# Patient Record
Sex: FEMALE | Race: WHITE | NOT HISPANIC OR LATINO | Employment: OTHER | URBAN - NONMETROPOLITAN AREA
[De-identification: names, ages, dates, MRNs, and addresses within clinical notes are randomized per-mention and may not be internally consistent; named-entity substitution may affect disease eponyms.]

---

## 2017-07-07 ENCOUNTER — TRANSCRIBE ORDERS (OUTPATIENT)
Dept: CARDIAC SURGERY | Facility: CLINIC | Age: 47
End: 2017-07-07

## 2017-07-07 DIAGNOSIS — N18.30 CHRONIC KIDNEY DISEASE, STAGE III (MODERATE) (HCC): Primary | ICD-10-CM

## 2017-08-01 RX ORDER — CITALOPRAM 40 MG/1
40 TABLET ORAL DAILY
COMMUNITY
Start: 2016-09-30 | End: 2017-08-10 | Stop reason: SDUPTHER

## 2017-08-01 RX ORDER — DIAZEPAM 5 MG/1
5 TABLET ORAL NIGHTLY PRN
COMMUNITY
End: 2017-08-10 | Stop reason: SDUPTHER

## 2017-08-01 RX ORDER — FAMOTIDINE 10 MG
20 TABLET ORAL DAILY
COMMUNITY
End: 2017-08-10 | Stop reason: SDUPTHER

## 2017-08-01 RX ORDER — MEGESTROL ACETATE 40 MG/ML
200 SUSPENSION ORAL DAILY
COMMUNITY
End: 2017-08-10 | Stop reason: SDUPTHER

## 2017-08-01 RX ORDER — LAMOTRIGINE 150 MG/1
150 TABLET ORAL 2 TIMES DAILY
COMMUNITY
Start: 2017-03-07 | End: 2017-08-10 | Stop reason: SDUPTHER

## 2017-08-01 RX ORDER — HYDROCHLOROTHIAZIDE 12.5 MG/1
12.5 CAPSULE, GELATIN COATED ORAL DAILY
COMMUNITY
End: 2017-08-10 | Stop reason: SDUPTHER

## 2017-08-08 ENCOUNTER — ANESTHESIA (OUTPATIENT)
Dept: GASTROENTEROLOGY | Facility: HOSPITAL | Age: 47
End: 2017-08-08

## 2017-08-08 ENCOUNTER — HOSPITAL ENCOUNTER (OUTPATIENT)
Facility: HOSPITAL | Age: 47
Setting detail: HOSPITAL OUTPATIENT SURGERY
Discharge: HOME | End: 2017-08-08
Attending: INTERNAL MEDICINE | Admitting: INTERNAL MEDICINE

## 2017-08-08 ENCOUNTER — ANESTHESIA EVENT (OUTPATIENT)
Dept: GASTROENTEROLOGY | Facility: HOSPITAL | Age: 47
End: 2017-08-08

## 2017-08-08 VITALS
OXYGEN SATURATION: 97 % | BODY MASS INDEX: 28.34 KG/M2 | RESPIRATION RATE: 18 BRPM | HEIGHT: 67 IN | SYSTOLIC BLOOD PRESSURE: 147 MMHG | HEART RATE: 106 BPM | DIASTOLIC BLOOD PRESSURE: 81 MMHG | WEIGHT: 180.56 LBS | TEMPERATURE: 97.4 F

## 2017-08-08 DIAGNOSIS — K22.70 BARRETT'S ESOPHAGUS DETERMINED BY BIOPSY: ICD-10-CM

## 2017-08-08 PROCEDURE — 25010000002 PROPOFOL 10 MG/ML EMULSION: Performed by: NURSE ANESTHETIST, CERTIFIED REGISTERED

## 2017-08-08 PROCEDURE — 88305 TISSUE EXAM BY PATHOLOGIST: CPT | Performed by: PATHOLOGY

## 2017-08-08 PROCEDURE — 88305 TISSUE EXAM BY PATHOLOGIST: CPT | Performed by: INTERNAL MEDICINE

## 2017-08-08 RX ORDER — DEXTROSE AND SODIUM CHLORIDE 5; .45 G/100ML; G/100ML
20 INJECTION, SOLUTION INTRAVENOUS CONTINUOUS
Status: DISCONTINUED | OUTPATIENT
Start: 2017-08-08 | End: 2017-08-08 | Stop reason: HOSPADM

## 2017-08-08 RX ORDER — LIDOCAINE HYDROCHLORIDE 10 MG/ML
INJECTION, SOLUTION INFILTRATION; PERINEURAL AS NEEDED
Status: DISCONTINUED | OUTPATIENT
Start: 2017-08-08 | End: 2017-08-08 | Stop reason: SURG

## 2017-08-08 RX ORDER — PROPOFOL 10 MG/ML
VIAL (ML) INTRAVENOUS AS NEEDED
Status: DISCONTINUED | OUTPATIENT
Start: 2017-08-08 | End: 2017-08-08 | Stop reason: SURG

## 2017-08-08 RX ADMIN — PROPOFOL 40 MG: 10 INJECTION, EMULSION INTRAVENOUS at 08:45

## 2017-08-08 RX ADMIN — LIDOCAINE HYDROCHLORIDE 100 MG: 10 INJECTION, SOLUTION INFILTRATION; PERINEURAL at 08:37

## 2017-08-08 RX ADMIN — PROPOFOL 40 MG: 10 INJECTION, EMULSION INTRAVENOUS at 08:39

## 2017-08-08 RX ADMIN — PROPOFOL 40 MG: 10 INJECTION, EMULSION INTRAVENOUS at 08:51

## 2017-08-08 RX ADMIN — PROPOFOL 40 MG: 10 INJECTION, EMULSION INTRAVENOUS at 08:41

## 2017-08-08 RX ADMIN — PROPOFOL 120 MG: 10 INJECTION, EMULSION INTRAVENOUS at 08:37

## 2017-08-08 RX ADMIN — DEXTROSE AND SODIUM CHLORIDE 20 ML/HR: 5; 450 INJECTION, SOLUTION INTRAVENOUS at 07:40

## 2017-08-08 RX ADMIN — PROPOFOL 40 MG: 10 INJECTION, EMULSION INTRAVENOUS at 08:49

## 2017-08-08 NOTE — PLAN OF CARE
Problem: Patient Care Overview (Adult)  Goal: Plan of Care Review  Outcome: Ongoing (interventions implemented as appropriate)    08/08/17 0852   Coping/Psychosocial Response Interventions   Plan Of Care Reviewed With patient   Patient Care Overview   Progress no change   Outcome Evaluation   Outcome Summary/Follow up Plan vss         Problem: GI Endoscopy (Adult)  Goal: Signs and Symptoms of Listed Potential Problems Will be Absent or Manageable (GI Endoscopy)  Outcome: Ongoing (interventions implemented as appropriate)    08/08/17 0852   GI Endoscopy   Problems Assessed (GI Endoscopy) all   Problems Present (GI Endoscopy) none

## 2017-08-08 NOTE — PLAN OF CARE
Problem: Patient Care Overview (Adult)  Goal: Plan of Care Review  Outcome: Outcome(s) achieved Date Met:  08/08/17 08/08/17 0939   Coping/Psychosocial Response Interventions   Plan Of Care Reviewed With patient   Patient Care Overview   Progress no change   Outcome Evaluation   Outcome Summary/Follow up Plan vss         Problem: GI Endoscopy (Adult)  Goal: Signs and Symptoms of Listed Potential Problems Will be Absent or Manageable (GI Endoscopy)  Outcome: Outcome(s) achieved Date Met:  08/08/17 08/08/17 0939   GI Endoscopy   Problems Assessed (GI Endoscopy) all   Problems Present (GI Endoscopy) none

## 2017-08-08 NOTE — DISCHARGE INSTR - APPOINTMENTS
Dr. Ishmael Mosley, DO  [address], [address]  [place] [address]  [phone]    Appointment date and time:  February 9th, 2018 at 9:15 a.m.

## 2017-08-08 NOTE — ANESTHESIA PREPROCEDURE EVALUATION
Anesthesia Evaluation     NPO Solid Status: > 8 hours  NPO Liquid Status: > 8 hours     Airway   Mallampati: III  TM distance: >3 FB  Neck ROM: full  Dental - normal exam     Pulmonary - normal exam   Cardiovascular - normal exam    (+) hypertension,       Neuro/Psych  (+) seizures, psychiatric history,    GI/Hepatic/Renal/Endo    (+)  renal disease,     Musculoskeletal     Abdominal    Substance History      OB/GYN          Other      history of cancer                                    Anesthesia Plan    ASA 3     MAC     intravenous induction   Anesthetic plan and risks discussed with patient.

## 2017-08-08 NOTE — H&P
Marvin Rosen DO,[facility]  Gastroenterology  Hepatology  Endoscopy  Board Certified in Internal Medicine and gastroenterology  [address], [address]  [place]. [address]  T- [phone]   F - [phone]     GASTROENTEROLOGY HISTORY AND PHYSICAL  NOTE   MARVIN ROSEN DO.         SUBJECTIVE:   8/8/2017    Name: Teodora Whitman  DOD: 1970        Chief Complaint:     Subjective : Turpin's esophagus with indefinite for dysplasia in August 2016.  The mother has been reluctant to have a repeat EGD.  I told her that with this questionable abnormality we need to make sure that there is no evidence of any progression of any dysplasia    Patient is 47 y.o. female presents with elective EGD.      ROS/HISTORY/ CURRENT MEDICATIONS/OBJECTIVE/VS/PE:   Review of Systems:   Review of Systems    History:     Past Medical History:   Diagnosis Date   • Closed fracture of rib     Closed fracture of rib - LEFT 7TH?    • Hodgkin's disease     lung   • Hypertension    • Mental retardation    • OCD (obsessive compulsive disorder)    • Rib pain on left side    • Seizures      History reviewed. No pertinent surgical history.  History reviewed. No pertinent family history.  Social History   Substance Use Topics   • Smoking status: Never Smoker   • Smokeless tobacco: None   • Alcohol use No     Prescriptions Prior to Admission   Medication Sig Dispense Refill Last Dose   • citalopram (CeleXA) 40 MG tablet Take 40 mg by mouth Daily.   8/7/2017 at Unknown time   • diazePAM (VALIUM) 5 MG tablet Take 5 mg by mouth At Night As Needed for Anxiety (1/2 tablet).   8/7/2017 at Unknown time   • famotidine (PEPCID) 10 MG tablet Take 20 mg by mouth Daily.   8/7/2017 at Unknown time   • hydrochlorothiazide (MICROZIDE) 12.5 MG capsule Take 12.5 mg by mouth Daily.   8/7/2017 at Unknown time   • lamoTRIgine (LaMICtal) 150 MG tablet Take 150 mg by mouth Daily.   8/7/2017 at Unknown time   • megestrol (MEGACE) 40 MG/ML suspension  Take 200 mg by mouth Daily.   8/7/2017 at Unknown time     Allergies:  Sulfa antibiotics    I have reviewed the patients medical history, surgical history and family history in the available medical record system.     Current Medications:     Current Facility-Administered Medications   Medication Dose Route Frequency Provider Last Rate Last Dose   • dextrose 5 % and sodium chloride 0.45 % infusion  20 mL/hr Intravenous Continuous Ishmael Mosley, DO 20 mL/hr at 08/08/17 0740 20 mL/hr at 08/08/17 0740       Objective     Physical Exam:   Temp:  [98.6 °F (37 °C)] 98.6 °F (37 °C)  Heart Rate:  [115] 115  Resp:  [16] 16  BP: (174)/(89) 174/89    Physical Exam:  General Appearance:    Alert, cooperative, in no acute distress   Head:    Normocephalic, without obvious abnormality, atraumatic   Eyes:            Lids and lashes normal, conjunctivae and sclerae normal, no   icterus, no pallor, corneas clear, PERRLA   Ears:    Ears appear intact with no abnormalities noted   Throat:   No oral lesions, no thrush, oral mucosa moist   Neck:   No adenopathy, supple, trachea midline, no thyromegaly, no     carotid bruit, no JVD   Back:     No kyphosis present, no scoliosis present, no skin lesions,       erythema or scars, no tenderness to percussion or                   palpation,   range of motion normal   Lungs:     Clear to auscultation,respirations regular, even and                   unlabored    Heart:    Regular rhythm and normal rate, normal S1 and S2, no            murmur, no gallop, no rub, no click   Breast Exam:    Deferred   Abdomen:     Normal bowel sounds, no masses, no organomegaly, soft        non-tender, non-distended, no guarding, no rebound                 tenderness   Genitalia:    Deferred   Extremities:   Moves all extremities well, no edema, no cyanosis, no              redness   Pulses:   Pulses palpable and equal bilaterally   Skin:   No bleeding, bruising or rash   Lymph nodes:   No palpable adenopathy    Neurologic:   Cranial nerves 2 - 12 grossly intact, sensation intact, DTR        present and equal bilaterally      Results Review:     Lab Results   Component Value Date    WBC 7.3 05/11/2015    WBC 5.8 04/24/2015    WBC 5.8 03/26/2015    HGB 10.0 (L) 05/11/2015    HGB 10.3 (L) 04/24/2015    HGB 10.9 (L) 03/26/2015    HCT 32.5 (L) 05/11/2015    HCT 33.5 (L) 04/24/2015    HCT 34.5 (L) 03/26/2015    HCT 34.5 (L) 03/26/2015     05/11/2015     04/24/2015     03/26/2015             No results found for: LIPASE  No results found for: INR       Radiology Review:  Imaging Results (last 72 hours)     ** No results found for the last 72 hours. **           I reviewed the patient's new clinical results.  I reviewed the patient's new imaging results and agree with the interpretation.     ASSESSMENT/PLAN:   ASSESSMENT:   1.  Turpin's esophagus with indefinite for dysplasia    PLAN:   1.  Esophagogastroduodenoscopy with biopsies    Risk and benefits associated with the procedure are reviewed with the patient and mother.  The patient wishes to proceed along with the mother's permission      Ishmael Mosley DO  08/08/17  8:04 AM

## 2017-08-08 NOTE — ANESTHESIA POSTPROCEDURE EVALUATION
Patient: Teodora Whitman    Procedure Summary     Date Anesthesia Start Anesthesia Stop Room / Location    08/08/17 0823 0855 [facility] ENDOSCOPY 2 / [facility] ENDOSCOPY       Procedure Diagnosis Surgeon Provider    ESOPHAGOGASTRODUODENOSCOPY (N/A Esophagus) Turpin's esophagus determined by biopsy  (BARRETTSE) DO Jas Rice CRNA          Anesthesia Type: MAC  Last vitals  BP        Temp        Pulse       Resp        SpO2          Post Anesthesia Care and Evaluation    Patient location during evaluation: bedside  Patient participation: waiting for patient participation  Level of consciousness: responsive to verbal stimuli  Pain management: adequate  Airway patency: patent  Anesthetic complications: No anesthetic complications  PONV Status: none  Cardiovascular status: acceptable  Respiratory status: acceptable  Hydration status: acceptable

## 2017-08-10 ENCOUNTER — APPOINTMENT (OUTPATIENT)
Dept: LAB | Facility: HOSPITAL | Age: 47
End: 2017-08-10

## 2017-08-10 ENCOUNTER — OFFICE VISIT (OUTPATIENT)
Dept: FAMILY MEDICINE CLINIC | Facility: CLINIC | Age: 47
End: 2017-08-10

## 2017-08-10 VITALS
HEIGHT: 67 IN | DIASTOLIC BLOOD PRESSURE: 84 MMHG | WEIGHT: 181.2 LBS | BODY MASS INDEX: 28.44 KG/M2 | SYSTOLIC BLOOD PRESSURE: 140 MMHG

## 2017-08-10 DIAGNOSIS — F42.9 OCD (OBSESSIVE COMPULSIVE DISORDER): ICD-10-CM

## 2017-08-10 DIAGNOSIS — Z79.899 HIGH RISK MEDICATION USE: ICD-10-CM

## 2017-08-10 DIAGNOSIS — I10 ESSENTIAL HYPERTENSION: ICD-10-CM

## 2017-08-10 DIAGNOSIS — G40.909 SEIZURE DISORDER (HCC): Primary | ICD-10-CM

## 2017-08-10 PROBLEM — N18.9 CKD (CHRONIC KIDNEY DISEASE): Status: ACTIVE | Noted: 2017-08-10

## 2017-08-10 LAB
ALBUMIN SERPL-MCNC: 4.4 G/DL (ref 3.4–4.8)
ALBUMIN/GLOB SERPL: 1.4 G/DL (ref 1.1–1.8)
ALP SERPL-CCNC: 95 U/L (ref 38–126)
ALT SERPL W P-5'-P-CCNC: 35 U/L (ref 9–52)
ANION GAP SERPL CALCULATED.3IONS-SCNC: 12 MMOL/L (ref 5–15)
ARTICHOKE IGE QN: 143 MG/DL (ref 1–129)
AST SERPL-CCNC: 29 U/L (ref 14–36)
BILIRUB SERPL-MCNC: 0.4 MG/DL (ref 0.2–1.3)
BUN BLD-MCNC: 19 MG/DL (ref 7–21)
BUN/CREAT SERPL: 14.4 (ref 7–25)
CALCIUM SPEC-SCNC: 9.5 MG/DL (ref 8.4–10.2)
CHLORIDE SERPL-SCNC: 98 MMOL/L (ref 95–110)
CHOLEST SERPL-MCNC: 195 MG/DL (ref 0–199)
CO2 SERPL-SCNC: 29 MMOL/L (ref 22–31)
CREAT BLD-MCNC: 1.32 MG/DL (ref 0.5–1)
DEPRECATED RDW RBC AUTO: 42.4 FL (ref 36.4–46.3)
ERYTHROCYTE [DISTWIDTH] IN BLOOD BY AUTOMATED COUNT: 13.8 % (ref 11.5–14.5)
GFR SERPL CREATININE-BSD FRML MDRD: 43 ML/MIN/1.73 (ref 60–135)
GLOBULIN UR ELPH-MCNC: 3.1 GM/DL (ref 2.3–3.5)
GLUCOSE BLD-MCNC: 95 MG/DL (ref 60–100)
HCT VFR BLD AUTO: 36 % (ref 35–45)
HDLC SERPL-MCNC: 44 MG/DL (ref 60–200)
HGB BLD-MCNC: 11.2 G/DL (ref 12–15.5)
LAB AP CASE REPORT: NORMAL
LAB AP INTRADEPARTMENTAL CONSULT: NORMAL
LDLC/HDLC SERPL: 2.9 {RATIO} (ref 0–3.22)
Lab: NORMAL
MCH RBC QN AUTO: 26.2 PG (ref 26.5–34)
MCHC RBC AUTO-ENTMCNC: 31.1 G/DL (ref 31.4–36)
MCV RBC AUTO: 84.3 FL (ref 80–98)
PATH REPORT.FINAL DX SPEC: NORMAL
PATH REPORT.GROSS SPEC: NORMAL
PLATELET # BLD AUTO: 377 10*3/MM3 (ref 150–450)
PMV BLD AUTO: 8.8 FL (ref 8–12)
POTASSIUM BLD-SCNC: 3.9 MMOL/L (ref 3.5–5.1)
PROT SERPL-MCNC: 7.5 G/DL (ref 6.3–8.6)
RBC # BLD AUTO: 4.27 10*6/MM3 (ref 3.77–5.16)
SODIUM BLD-SCNC: 139 MMOL/L (ref 137–145)
TRIGL SERPL-MCNC: 118 MG/DL (ref 20–199)
WBC NRBC COR # BLD: 6.52 10*3/MM3 (ref 3.2–9.8)

## 2017-08-10 PROCEDURE — 80053 COMPREHEN METABOLIC PANEL: CPT | Performed by: FAMILY MEDICINE

## 2017-08-10 PROCEDURE — 85027 COMPLETE CBC AUTOMATED: CPT | Performed by: FAMILY MEDICINE

## 2017-08-10 PROCEDURE — 99214 OFFICE O/P EST MOD 30 MIN: CPT | Performed by: FAMILY MEDICINE

## 2017-08-10 PROCEDURE — 80307 DRUG TEST PRSMV CHEM ANLYZR: CPT | Performed by: FAMILY MEDICINE

## 2017-08-10 PROCEDURE — 36415 COLL VENOUS BLD VENIPUNCTURE: CPT | Performed by: FAMILY MEDICINE

## 2017-08-10 PROCEDURE — 80061 LIPID PANEL: CPT | Performed by: FAMILY MEDICINE

## 2017-08-10 PROCEDURE — G0481 DRUG TEST DEF 8-14 CLASSES: HCPCS | Performed by: FAMILY MEDICINE

## 2017-08-10 PROCEDURE — 80175 DRUG SCREEN QUAN LAMOTRIGINE: CPT | Performed by: FAMILY MEDICINE

## 2017-08-10 RX ORDER — CITALOPRAM 40 MG/1
40 TABLET ORAL DAILY
Qty: 90 TABLET | Refills: 2 | Status: SHIPPED | OUTPATIENT
Start: 2017-08-10 | End: 2018-08-07 | Stop reason: SDUPTHER

## 2017-08-10 RX ORDER — LAMOTRIGINE 150 MG/1
150 TABLET ORAL 2 TIMES DAILY
Qty: 180 TABLET | Refills: 2 | Status: SHIPPED | OUTPATIENT
Start: 2017-08-10 | End: 2018-03-22 | Stop reason: SDUPTHER

## 2017-08-10 RX ORDER — DIAZEPAM 5 MG/1
5 TABLET ORAL NIGHTLY PRN
Qty: 30 TABLET | Refills: 2 | Status: SHIPPED | OUTPATIENT
Start: 2017-08-10 | End: 2017-11-13 | Stop reason: SDUPTHER

## 2017-08-10 RX ORDER — MEGESTROL ACETATE 40 MG/ML
200 SUSPENSION ORAL DAILY
Qty: 150 ML | Refills: 6 | Status: SHIPPED | OUTPATIENT
Start: 2017-08-10 | End: 2017-11-13 | Stop reason: SDUPTHER

## 2017-08-10 RX ORDER — FAMOTIDINE 10 MG
20 TABLET ORAL DAILY
Qty: 180 TABLET | Refills: 2 | Status: SHIPPED | OUTPATIENT
Start: 2017-08-10 | End: 2018-04-02

## 2017-08-10 RX ORDER — HYDROCHLOROTHIAZIDE 12.5 MG/1
12.5 CAPSULE, GELATIN COATED ORAL DAILY
Qty: 90 CAPSULE | Refills: 2 | Status: SHIPPED | OUTPATIENT
Start: 2017-08-10 | End: 2018-02-07 | Stop reason: SDUPTHER

## 2017-08-10 NOTE — PROGRESS NOTES
Subjective   Teodora Whitman is a 47 y.o. female.     History of Present Illness   Ms. Whitman is a 46yo female that presents today for the first time with her mother. She has been seeing Dr. Restrepo before.  She has seizure disorder that is well controlled. She hasn't had any seizures since she was a small child, but when she tried to come off the medications and she had some seizures.    She has had issues with OCS and selective mutism. She lives with her mother and has issues with mood swings.  She has more behavioral issues when she has headaches. She gets a lot of sinus headaches.  When she takes her sinus medication starts to work then she is feeling better. She also has been doing better since she got a new puppy.  She has had issues with her periods as well. She has been taking megace and that has stopped her periods. She hasn't had as many issues with mood swings.    She has been on celexa for OCD and that has been helping. They have tried to take her off that medication and she had a lot of issues with compulsions. She has been taking valium also. That has mellowed her out a lot.    She has hypertension and she has had new diagnosis of renal disease.  She has been taking HCTZ and he stopped her lisinopril. BP has been okay since that change was made. She has a follow-up with Dr. Arce later this month.      Current Outpatient Prescriptions:   •  citalopram (CeleXA) 40 MG tablet, Take 40 mg by mouth Daily., Disp: , Rfl:   •  diazePAM (VALIUM) 5 MG tablet, Take 5 mg by mouth At Night As Needed for Anxiety (1/2 tablet)., Disp: , Rfl:   •  famotidine (PEPCID) 10 MG tablet, Take 20 mg by mouth Daily., Disp: , Rfl:   •  hydrochlorothiazide (MICROZIDE) 12.5 MG capsule, Take 12.5 mg by mouth Daily., Disp: , Rfl:   •  lamoTRIgine (LaMICtal) 150 MG tablet, Take 150 mg by mouth 2 (Two) Times a Day., Disp: , Rfl:   •  megestrol (MEGACE) 40 MG/ML suspension, Take 200 mg by mouth Daily., Disp: , Rfl:     The  "following portions of the patient's history were reviewed and updated as appropriate: allergies, current medications, past family history, past medical history, past social history, past surgical history and problem list.    Review of Systems   Constitutional: Negative for activity change, appetite change, fatigue and unexpected weight change.   Eyes: Negative for visual disturbance.   Respiratory: Negative for cough, shortness of breath and wheezing.    Cardiovascular: Negative for chest pain, palpitations and leg swelling.   Gastrointestinal: Negative for abdominal distention, abdominal pain, constipation, diarrhea, nausea and vomiting.   Musculoskeletal: Negative for arthralgias, back pain, gait problem and joint swelling.   Skin: Negative for pallor, rash and wound.   Neurological: Negative for headaches.   Psychiatric/Behavioral: Positive for agitation, decreased concentration and dysphoric mood. Negative for hallucinations, self-injury, sleep disturbance and suicidal ideas. The patient is not nervous/anxious and is not hyperactive.        Objective    Vitals:    08/10/17 1453   BP: 140/84   Weight: 181 lb 3.2 oz (82.2 kg)   Height: 67\" (170.2 cm)       Physical Exam   Constitutional: She is oriented to person, place, and time. She appears well-developed and well-nourished. No distress.   Neck: No thyromegaly present.   NO LAD   Cardiovascular: Normal rate, regular rhythm and normal heart sounds.    No murmur heard.  No LE edema.   Pulmonary/Chest: Effort normal and breath sounds normal. No respiratory distress.   Abdominal: Soft. Bowel sounds are normal. She exhibits no distension. There is no tenderness.   Neurological: She is alert and oriented to person, place, and time.   Psychiatric: She has a normal mood and affect. Her behavior is normal. Judgment and thought content normal.   Nursing note and vitals reviewed.      Assessment/Plan   Problems Addressed this Visit        Cardiovascular and Mediastinum    " Essential hypertension    Relevant Medications    hydrochlorothiazide (MICROZIDE) 12.5 MG capsule      Other Visit Diagnoses     Seizure disorder    -  Primary    Relevant Medications    lamoTRIgine (LaMICtal) 150 MG tablet    OCD (obsessive compulsive disorder)        Relevant Medications    diazePAM (VALIUM) 5 MG tablet    citalopram (CeleXA) 40 MG tablet    High risk medication use        Relevant Orders    CBC (No Diff) (Completed)    Comprehensive Metabolic Panel (Completed)    Lipid Panel (Completed)    ToxASSURE Select 13 (MW)    Lamotrigine level        1.) Seizure Disorder- Well controlled on current medications.  Hasn't done well trying to come off them. Will check lamictal levels.  Check CMP and CBC also.  Continue current medications.  2.) OCD-  She takes celexa and this has helped a lot. She hasn't had to take the valium as much. Will continue those medications for now.  UDS today. The patient has read and signed the [facility] Controlled Substance Contract.  I will continue to see patient for regular follow up appointments.  They are well controlled on their medication.  ALE has been reviewed by me and is updated every 3 months. The patient is aware of the potential for addiction and dependence.  Her mother signed her controlled contract today.  3.) HTN-  Well controlled. Continue seeing nephrology and will continue to monitor.  RTC in 3 months or sooner PRN. Medicare Wellness at that appointment.

## 2017-08-13 PROBLEM — I10 ESSENTIAL HYPERTENSION: Status: ACTIVE | Noted: 2017-08-13

## 2017-08-15 ENCOUNTER — LAB (OUTPATIENT)
Dept: LAB | Facility: HOSPITAL | Age: 47
End: 2017-08-15

## 2017-08-15 ENCOUNTER — TRANSCRIBE ORDERS (OUTPATIENT)
Dept: LAB | Facility: HOSPITAL | Age: 47
End: 2017-08-15

## 2017-08-15 DIAGNOSIS — C81.90 HODGKIN LYMPHOMA, UNSPECIFIED HODGKIN LYMPHOMA TYPE, UNSPECIFIED BODY REGION (HCC): ICD-10-CM

## 2017-08-15 DIAGNOSIS — E55.9 VITAMIN D DEFICIENCY: ICD-10-CM

## 2017-08-15 DIAGNOSIS — N18.9 CHRONIC KIDNEY DISEASE, UNSPECIFIED STAGE: ICD-10-CM

## 2017-08-15 DIAGNOSIS — K21.9 GASTROESOPHAGEAL REFLUX DISEASE, ESOPHAGITIS PRESENCE NOT SPECIFIED: ICD-10-CM

## 2017-08-15 DIAGNOSIS — F79 INTELLECTUAL FUNCTIONING DISABILITY: ICD-10-CM

## 2017-08-15 DIAGNOSIS — C81.90 HODGKIN LYMPHOMA, UNSPECIFIED HODGKIN LYMPHOMA TYPE, UNSPECIFIED BODY REGION (HCC): Primary | ICD-10-CM

## 2017-08-15 LAB
25(OH)D3 SERPL-MCNC: 17.4 NG/ML (ref 30–100)
ALBUMIN SERPL-MCNC: 4.4 G/DL (ref 3.4–4.8)
ANION GAP SERPL CALCULATED.3IONS-SCNC: 12 MMOL/L (ref 5–15)
BUN BLD-MCNC: 17 MG/DL (ref 7–21)
BUN/CREAT SERPL: 12.9 (ref 7–25)
CALCIUM SPEC-SCNC: 9.3 MG/DL (ref 8.4–10.2)
CHLORIDE SERPL-SCNC: 99 MMOL/L (ref 95–110)
CO2 SERPL-SCNC: 28 MMOL/L (ref 22–31)
CREAT BLD-MCNC: 1.32 MG/DL (ref 0.5–1)
CREAT UR-MCNC: 194.6 MG/DL
GFR SERPL CREATININE-BSD FRML MDRD: 43 ML/MIN/1.73 (ref 60–135)
GLUCOSE BLD-MCNC: 88 MG/DL (ref 60–100)
HCT VFR BLD AUTO: 35.4 % (ref 35–45)
HGB BLD-MCNC: 11.1 G/DL (ref 12–15.5)
LAMOTRIGINE SERPL-MCNC: 9 UG/ML (ref 2–20)
PHOSPHATE SERPL-MCNC: 4.1 MG/DL (ref 2.4–4.4)
POTASSIUM BLD-SCNC: 3.7 MMOL/L (ref 3.5–5.1)
PROT UR-MCNC: 9.5 MG/DL
PROT/CREAT UR: 48.8 MG/G CREA (ref 0–200)
SODIUM BLD-SCNC: 139 MMOL/L (ref 137–145)
URATE SERPL-MCNC: 5.1 MG/DL (ref 2.5–8.5)

## 2017-08-15 PROCEDURE — 84550 ASSAY OF BLOOD/URIC ACID: CPT | Performed by: INTERNAL MEDICINE

## 2017-08-15 PROCEDURE — 84156 ASSAY OF PROTEIN URINE: CPT | Performed by: INTERNAL MEDICINE

## 2017-08-15 PROCEDURE — 82570 ASSAY OF URINE CREATININE: CPT | Performed by: INTERNAL MEDICINE

## 2017-08-15 PROCEDURE — 83970 ASSAY OF PARATHORMONE: CPT | Performed by: INTERNAL MEDICINE

## 2017-08-15 PROCEDURE — 85014 HEMATOCRIT: CPT | Performed by: INTERNAL MEDICINE

## 2017-08-15 PROCEDURE — 80069 RENAL FUNCTION PANEL: CPT | Performed by: INTERNAL MEDICINE

## 2017-08-15 PROCEDURE — 36415 COLL VENOUS BLD VENIPUNCTURE: CPT

## 2017-08-15 PROCEDURE — 82306 VITAMIN D 25 HYDROXY: CPT | Performed by: INTERNAL MEDICINE

## 2017-08-15 PROCEDURE — 85018 HEMOGLOBIN: CPT | Performed by: INTERNAL MEDICINE

## 2017-08-16 LAB — PTH-INTACT SERPL-MCNC: 136.3 PG/ML (ref 10–65)

## 2017-08-19 LAB — CONV REPORT SUMMARY: NORMAL

## 2017-11-08 ENCOUNTER — TELEPHONE (OUTPATIENT)
Dept: FAMILY MEDICINE CLINIC | Facility: CLINIC | Age: 47
End: 2017-11-08

## 2017-11-13 ENCOUNTER — OFFICE VISIT (OUTPATIENT)
Dept: FAMILY MEDICINE CLINIC | Facility: CLINIC | Age: 47
End: 2017-11-13

## 2017-11-13 VITALS
SYSTOLIC BLOOD PRESSURE: 136 MMHG | DIASTOLIC BLOOD PRESSURE: 86 MMHG | HEIGHT: 67 IN | WEIGHT: 185 LBS | BODY MASS INDEX: 29.03 KG/M2

## 2017-11-13 DIAGNOSIS — Z00.00 INITIAL MEDICARE ANNUAL WELLNESS VISIT: Primary | ICD-10-CM

## 2017-11-13 DIAGNOSIS — F42.9 OBSESSIVE-COMPULSIVE DISORDER, UNSPECIFIED TYPE: ICD-10-CM

## 2017-11-13 PROCEDURE — G0438 PPPS, INITIAL VISIT: HCPCS | Performed by: FAMILY MEDICINE

## 2017-11-13 RX ORDER — ERGOCALCIFEROL 1.25 MG/1
50000 CAPSULE ORAL
COMMUNITY
End: 2019-05-20 | Stop reason: SDUPTHER

## 2017-11-13 RX ORDER — DIAZEPAM 5 MG/1
5 TABLET ORAL NIGHTLY PRN
Qty: 30 TABLET | Refills: 2 | Status: SHIPPED | OUTPATIENT
Start: 2017-11-13 | End: 2018-01-11 | Stop reason: SDUPTHER

## 2017-11-13 RX ORDER — MEGESTROL ACETATE 40 MG/ML
200 SUSPENSION ORAL DAILY
Qty: 150 ML | Refills: 6 | Status: SHIPPED | OUTPATIENT
Start: 2017-11-13 | End: 2017-12-13

## 2017-11-13 NOTE — PROGRESS NOTES
QUICK REFERENCE INFORMATION:  The ABCs of the Annual Wellness Visit    Initial Medicare Wellness Visit    HEALTH RISK ASSESSMENT    1970    Recent Hospitalizations:  No hospitalization(s) within the last year..      Current Medical Providers:  Patient Care Team:  Letitia Lea MD as PCP - General (Family Medicine)  Dr. Arce- Nephrology      Smoking Status:  History   Smoking Status   • Never Smoker   Smokeless Tobacco   • Never Used       Alcohol Consumption:  History   Alcohol Use No       Depression Screen:   PHQ-2/PHQ-9 Depression Screening 11/13/2017   Little interest or pleasure in doing things 0   Feeling down, depressed, or hopeless 0   Trouble falling or staying asleep, or sleeping too much 1   Feeling tired or having little energy 0   Poor appetite or overeating 0   Feeling bad about yourself - or that you are a failure or have let yourself or your family down 0   Trouble concentrating on things, such as reading the newspaper or watching television 1   Moving or speaking so slowly that other people could have noticed. Or the opposite - being so fidgety or restless that you have been moving around a lot more than usual 0   Thoughts that you would be better off dead, or of hurting yourself in some way 0   Total Score 2   If you checked off any problems, how difficult have these problems made it for you to do your work, take care of things at home, or get along with other people? Not difficult at all       Health Habits and Functional and Cognitive Screening:  Functional & Cognitive Status 11/13/2017   Do you have difficulty preparing food and eating? Yes   Do you have difficulty bathing yourself, getting dressed or grooming yourself? Yes   Do you have difficulty using the toilet? Yes   Do you have difficulty moving around from place to place? No   Do you have trouble with steps or getting out of a bed or a chair? No   In the past year have you fallen or experienced a near fall? Yes   Current  Diet Frequent Junk Food   Dental Exam Up to date   Eye Exam Not up to date   Exercise (times per week) 0 times per week   Current Exercise Activities Include None   Do you need help using the phone?  Yes   Are you deaf or do you have serious difficulty hearing?  No   Do you need help with transportation? Yes   Do you need help shopping? Yes   Do you need help preparing meals?  Yes   Do you need help with housework?  Yes   Do you need help with laundry? Yes   Do you need help taking your medications? Yes   Do you need help managing money? Yes   Have you felt unusual stress, anger or loneliness in the last month? No   Who do you live with? Other   If you need help, do you have trouble finding someone available to you? No   Have you been bothered in the last four weeks by sexual problems? No   Do you have difficulty concentrating, remembering or making decisions? Yes         Does the patient have evidence of cognitive impairment? Yes. She lives with her mother.     Asiprin use counseling: Does not need ASA (and currently is not on it)    Recent Lab Results:  Reviewed labs from August.    Visual Acuity:  No exam data present         Age-appropriate Screening Schedule:  Refer to the list below for future screening recommendations based on patient's age, sex and/or medical conditions. Orders for these recommended tests are listed in the plan section. The patient has been provided with a written plan.    Health Maintenance   Topic Date Due   • TDAP/TD VACCINES (2 - Td) 06/13/1990   • PAP SMEAR  08/10/2020   • INFLUENZA VACCINE  Completed        Subjective   History of Present Illness    Teodora Whitman is a 47 y.o. female who presents for an Annual Wellness Visit.    The following portions of the patient's history were reviewed and updated as appropriate: allergies, current medications, past family history, past medical history, past social history, past surgical history and problem list.    Outpatient Medications Prior  to Visit   Medication Sig Dispense Refill   • citalopram (CeleXA) 40 MG tablet Take 1 tablet by mouth Daily. 90 tablet 2   • famotidine (PEPCID) 10 MG tablet Take 2 tablets by mouth Daily. 180 tablet 2   • hydrochlorothiazide (MICROZIDE) 12.5 MG capsule Take 1 capsule by mouth Daily. 90 capsule 2   • lamoTRIgine (LaMICtal) 150 MG tablet Take 1 tablet by mouth 2 (Two) Times a Day. 180 tablet 2   • diazePAM (VALIUM) 5 MG tablet Take 1 tablet by mouth At Night As Needed for Anxiety (1/2 tablet). 30 tablet 2     No facility-administered medications prior to visit.        Patient Active Problem List   Diagnosis   • Aphasia   • Epilepsy   • Gastroesophageal reflux disease without esophagitis   • Iron deficiency anemia   • Obsessive-compulsive disorder   • History of Hodgkin's disease   • Unspecified intellectual disabilities   • CKD (chronic kidney disease)   • Essential hypertension       Advance Care Planning:  power of  for healthcare on file    Identification of Risk Factors:  Risk factors include: weight , unhealthy diet, cardiovascular risk, caretaker stress, cognitive impairment and polypharmacy.    Review of Systems   Constitutional: Negative for activity change, appetite change, fatigue and unexpected weight change.   Eyes: Negative for visual disturbance.   Respiratory: Negative for cough, shortness of breath and wheezing.    Cardiovascular: Negative for chest pain, palpitations and leg swelling.   Gastrointestinal: Negative for abdominal distention, abdominal pain, constipation, diarrhea, nausea and vomiting.   Musculoskeletal: Negative for arthralgias, back pain, gait problem and joint swelling.   Skin: Negative for pallor, rash and wound.   Neurological: Negative for headaches.   Psychiatric/Behavioral: Positive for agitation, decreased concentration and dysphoric mood. Negative for hallucinations, self-injury, sleep disturbance and suicidal ideas. The patient is not nervous/anxious and is not  "hyperactive.        Compared to one year ago, the patient feels her physical health is better.  Compared to one year ago, the patient feels her mental health is better.    Objective     Physical Exam   Constitutional: She is oriented to person, place, and time. She appears well-developed and well-nourished. No distress.   Neck: No thyromegaly present.   NO LAD   Cardiovascular: Normal rate, regular rhythm and normal heart sounds.    No murmur heard.  No LE edema.   Pulmonary/Chest: Effort normal and breath sounds normal. No respiratory distress.   Abdominal: Soft. Bowel sounds are normal. She exhibits no distension. There is no tenderness.   Neurological: She is alert and oriented to person, place, and time.   Psychiatric: She has a normal mood and affect. Her behavior is normal. Judgment and thought content normal.   Nursing note and vitals reviewed.      Vitals:    11/13/17 1306   BP: 136/86   Weight: 185 lb (83.9 kg)   Height: 67\" (170.2 cm)       Body mass index is 28.98 kg/(m^2).  Discussed the patient's BMI with her. The BMI is above average; BMI management plan is completed.    Assessment/Plan   Patient Self-Management and Personalized Health Advice  The patient has been provided with information about: diet, exercise, weight management, prevention of cardiac or vascular disease and mental health concerns and preventive services including:   · Advance directive, Counseling for cardiovascular disease risk reduction, Fall Risk assessment done, Fall Risk plan of care done, Influenza vaccine, Nutrition counseling provided.    Visit Diagnoses:    ICD-10-CM ICD-9-CM   1. Initial Medicare annual wellness visit Z00.00 V70.0   2. Obsessive-compulsive disorder, unspecified type F42.9 300.3       No orders of the defined types were placed in this encounter.      Outpatient Encounter Prescriptions as of 11/13/2017   Medication Sig Dispense Refill   • citalopram (CeleXA) 40 MG tablet Take 1 tablet by mouth Daily. 90 " tablet 2   • diazePAM (VALIUM) 5 MG tablet Take 1 tablet by mouth At Night As Needed for Anxiety (1/2 tablet). 30 tablet 2   • famotidine (PEPCID) 10 MG tablet Take 2 tablets by mouth Daily. 180 tablet 2   • hydrochlorothiazide (MICROZIDE) 12.5 MG capsule Take 1 capsule by mouth Daily. 90 capsule 2   • lamoTRIgine (LaMICtal) 150 MG tablet Take 1 tablet by mouth 2 (Two) Times a Day. 180 tablet 2   • [DISCONTINUED] diazePAM (VALIUM) 5 MG tablet Take 1 tablet by mouth At Night As Needed for Anxiety (1/2 tablet). 30 tablet 2   • megestrol (MEGACE) 40 MG/ML suspension Take 5 mL by mouth Daily for 30 days. 150 mL 6     No facility-administered encounter medications on file as of 11/13/2017.        Reviewed use of high risk medication in the elderly: yes  Reviewed for potential of harmful drug interactions in the elderly: yes    Follow Up:  Return in about 3 months (around 2/13/2018) for Recheck.     An After Visit Summary and PPPS with all of these plans were given to the patient.

## 2018-01-11 RX ORDER — DIAZEPAM 5 MG/1
5 TABLET ORAL NIGHTLY PRN
Qty: 30 TABLET | Refills: 2 | Status: SHIPPED | OUTPATIENT
Start: 2018-01-11 | End: 2018-04-02

## 2018-02-07 ENCOUNTER — APPOINTMENT (OUTPATIENT)
Dept: LAB | Facility: HOSPITAL | Age: 48
End: 2018-02-07

## 2018-02-07 ENCOUNTER — OFFICE VISIT (OUTPATIENT)
Dept: FAMILY MEDICINE CLINIC | Facility: CLINIC | Age: 48
End: 2018-02-07

## 2018-02-07 VITALS
BODY MASS INDEX: 28.97 KG/M2 | HEIGHT: 67 IN | WEIGHT: 184.6 LBS | DIASTOLIC BLOOD PRESSURE: 82 MMHG | SYSTOLIC BLOOD PRESSURE: 126 MMHG

## 2018-02-07 DIAGNOSIS — J01.00 ACUTE NON-RECURRENT MAXILLARY SINUSITIS: ICD-10-CM

## 2018-02-07 DIAGNOSIS — Z79.899 HIGH RISK MEDICATION USE: ICD-10-CM

## 2018-02-07 DIAGNOSIS — F42.9 OBSESSIVE-COMPULSIVE DISORDER, UNSPECIFIED TYPE: Primary | ICD-10-CM

## 2018-02-07 PROCEDURE — 80307 DRUG TEST PRSMV CHEM ANLYZR: CPT | Performed by: FAMILY MEDICINE

## 2018-02-07 PROCEDURE — G0481 DRUG TEST DEF 8-14 CLASSES: HCPCS | Performed by: FAMILY MEDICINE

## 2018-02-07 PROCEDURE — 99213 OFFICE O/P EST LOW 20 MIN: CPT | Performed by: FAMILY MEDICINE

## 2018-02-07 RX ORDER — HYDROCHLOROTHIAZIDE 12.5 MG/1
12.5 CAPSULE, GELATIN COATED ORAL DAILY
Qty: 90 CAPSULE | Refills: 2 | Status: SHIPPED | OUTPATIENT
Start: 2018-02-07 | End: 2018-08-07 | Stop reason: SDUPTHER

## 2018-02-07 RX ORDER — GUAIFENESIN 600 MG/1
1200 TABLET, EXTENDED RELEASE ORAL EVERY 12 HOURS SCHEDULED
Qty: 28 TABLET | Refills: 0 | Status: SHIPPED | OUTPATIENT
Start: 2018-02-07 | End: 2018-04-02

## 2018-02-07 RX ORDER — MEGESTROL ACETATE 40 MG/ML
SUSPENSION ORAL DAILY
COMMUNITY
Start: 2017-12-19 | End: 2018-08-07 | Stop reason: SDUPTHER

## 2018-02-07 RX ORDER — AMOXICILLIN AND CLAVULANATE POTASSIUM 875; 125 MG/1; MG/1
1 TABLET, FILM COATED ORAL 2 TIMES DAILY
Qty: 20 TABLET | Refills: 0 | Status: SHIPPED | OUTPATIENT
Start: 2018-02-07 | End: 2018-04-02

## 2018-02-07 NOTE — PROGRESS NOTES
Subjective   Teodora Whitman is a 47 y.o. female.       Ms. Whitman is a 46yo female with OCD that presents today with her guardian, her mother, for follow-up.  She has been taking valium at night and that has been helping her sleep more. She has been taking celexa also. Overall doing well on current medications.    Headache    This is a new problem. The current episode started more than 1 month ago. The problem occurs daily. The problem has been gradually worsening. The pain is located in the right unilateral region. The pain does not radiate. The pain quality is not similar to prior headaches. The quality of the pain is described as throbbing. The pain is at a severity of 2/10. The pain is mild. Associated symptoms include coughing, drainage, sinus pressure and a sore throat. Pertinent negatives include no abdominal pain, abnormal behavior, anorexia, back pain, blurred vision, dizziness, ear pain, eye pain, eye redness, eye watering, facial sweating, fever, hearing loss, insomnia, loss of balance, muscle aches, nausea, neck pain, numbness, phonophobia, photophobia, rhinorrhea, scalp tenderness, seizures, swollen glands, tingling, tinnitus, visual change, vomiting, weakness or weight loss. Nothing aggravates the symptoms. Treatments tried: nasal spray. The treatment provided no relief.          Current Outpatient Prescriptions:   •  citalopram (CeleXA) 40 MG tablet, Take 1 tablet by mouth Daily., Disp: 90 tablet, Rfl: 2  •  diazePAM (VALIUM) 5 MG tablet, Take 1 tablet by mouth At Night As Needed for Anxiety (1/2 tablet). (Patient taking differently: Take 5 mg by mouth At Night As Needed for Anxiety.), Disp: 30 tablet, Rfl: 2  •  famotidine (PEPCID) 10 MG tablet, Take 2 tablets by mouth Daily., Disp: 180 tablet, Rfl: 2  •  hydrochlorothiazide (MICROZIDE) 12.5 MG capsule, Take 1 capsule by mouth Daily., Disp: 90 capsule, Rfl: 2  •  lamoTRIgine (LaMICtal) 150 MG tablet, Take 1 tablet by mouth 2 (Two) Times a Day.,  "Disp: 180 tablet, Rfl: 2  •  megestrol (MEGACE) 40 MG/ML suspension, , Disp: , Rfl:   •  vitamin D (ERGOCALCIFEROL) 14363 units capsule capsule, Take 50,000 Units by mouth Every 14 (Fourteen) Days., Disp: , Rfl:     The following portions of the patient's history were reviewed and updated as appropriate: allergies, current medications, past family history, past medical history, past social history, past surgical history and problem list.    Review of Systems   Constitutional: Negative for activity change, appetite change, fatigue, fever, unexpected weight change and weight loss.   HENT: Positive for sinus pressure and sore throat. Negative for ear pain, hearing loss, rhinorrhea and tinnitus.    Eyes: Negative for blurred vision, photophobia, pain, redness and visual disturbance.   Respiratory: Positive for cough. Negative for shortness of breath and wheezing.    Cardiovascular: Negative for chest pain, palpitations and leg swelling.   Gastrointestinal: Negative for abdominal distention, abdominal pain, anorexia, constipation, diarrhea, nausea and vomiting.   Musculoskeletal: Negative for arthralgias, back pain, gait problem, joint swelling and neck pain.   Skin: Negative for pallor, rash and wound.   Neurological: Positive for headaches. Negative for dizziness, tingling, seizures, weakness, numbness and loss of balance.   Psychiatric/Behavioral: Negative for dysphoric mood and sleep disturbance. The patient is not nervous/anxious and does not have insomnia.        Objective    Vitals:    02/07/18 1259   BP: 126/82   Weight: 83.7 kg (184 lb 9.6 oz)   Height: 170.2 cm (67\")       Physical Exam   Constitutional: She is oriented to person, place, and time. She appears well-developed and well-nourished. No distress.   HENT:   Right Ear: Hearing, tympanic membrane, external ear and ear canal normal.   Left Ear: Hearing, tympanic membrane, external ear and ear canal normal.   Nose: Mucosal edema and rhinorrhea present. " Right sinus exhibits maxillary sinus tenderness. Right sinus exhibits no frontal sinus tenderness. Left sinus exhibits maxillary sinus tenderness. Left sinus exhibits no frontal sinus tenderness.   Mouth/Throat: Posterior oropharyngeal erythema present. No oropharyngeal exudate or posterior oropharyngeal edema.   Cardiovascular: Normal rate, regular rhythm and normal heart sounds.    No murmur heard.  Pulmonary/Chest: Effort normal and breath sounds normal. No respiratory distress. She has no wheezes.   Abdominal: Soft. Bowel sounds are normal. She exhibits no distension. There is no tenderness.   Neurological: She is alert and oriented to person, place, and time.   Psychiatric:   Doesn't talk but responsive with head gestures   Nursing note and vitals reviewed.      Assessment/Plan   Problems Addressed this Visit        Other    Obsessive-compulsive disorder - Primary    Relevant Medications    diazePAM (VALIUM) 5 MG tablet      Other Visit Diagnoses     High risk medication use        Relevant Orders    ToxASSURE Select 13 (MW) - Urine, Clean Catch    Acute non-recurrent maxillary sinusitis            1.) OCD- Will continue current medications.  UDS today. The patient has read and signed the [facility] Controlled Substance Contract.  I will continue to see patient for regular follow up appointments.  They are well controlled on their medication.  ALE has been reviewed by me and is updated every 3 months. The patient is aware of the potential for addiction and dependence.  2.) Sinusitis-  Will call in augmentin for 10 days.  Also mucinex to help with congestion.  RTC in 3 months or sooner PRN

## 2018-02-13 LAB — CONV REPORT SUMMARY: NORMAL

## 2018-02-19 ENCOUNTER — LAB (OUTPATIENT)
Dept: LAB | Facility: HOSPITAL | Age: 48
End: 2018-02-19

## 2018-02-19 ENCOUNTER — TRANSCRIBE ORDERS (OUTPATIENT)
Dept: LAB | Facility: HOSPITAL | Age: 48
End: 2018-02-19

## 2018-02-19 DIAGNOSIS — N18.30 CHRONIC RENAL DISEASE, STAGE III (HCC): ICD-10-CM

## 2018-02-19 DIAGNOSIS — C81.90 HODGKIN LYMPHOMA, UNSPECIFIED HODGKIN LYMPHOMA TYPE, UNSPECIFIED BODY REGION (HCC): Primary | ICD-10-CM

## 2018-02-19 DIAGNOSIS — F78.A9: ICD-10-CM

## 2018-02-19 DIAGNOSIS — K21.9 GASTROESOPHAGEAL REFLUX DISEASE, ESOPHAGITIS PRESENCE NOT SPECIFIED: ICD-10-CM

## 2018-02-19 DIAGNOSIS — C81.90 HODGKIN LYMPHOMA, UNSPECIFIED HODGKIN LYMPHOMA TYPE, UNSPECIFIED BODY REGION (HCC): ICD-10-CM

## 2018-02-19 LAB
25(OH)D3 SERPL-MCNC: 23.4 NG/ML (ref 30–100)
ANION GAP SERPL CALCULATED.3IONS-SCNC: 16 MMOL/L (ref 5–15)
BUN BLD-MCNC: 18 MG/DL (ref 7–21)
BUN/CREAT SERPL: 14.4 (ref 7–25)
CALCIUM SPEC-SCNC: 9.4 MG/DL (ref 8.4–10.2)
CHLORIDE SERPL-SCNC: 97 MMOL/L (ref 95–110)
CO2 SERPL-SCNC: 27 MMOL/L (ref 22–31)
CREAT BLD-MCNC: 1.25 MG/DL (ref 0.5–1)
FERRITIN SERPL-MCNC: 7.8 NG/ML (ref 6.2–137)
GFR SERPL CREATININE-BSD FRML MDRD: 46 ML/MIN/1.73 (ref 58–135)
GLUCOSE BLD-MCNC: 104 MG/DL (ref 60–100)
POTASSIUM BLD-SCNC: 3.9 MMOL/L (ref 3.5–5.1)
SODIUM BLD-SCNC: 140 MMOL/L (ref 137–145)

## 2018-02-19 PROCEDURE — 36415 COLL VENOUS BLD VENIPUNCTURE: CPT

## 2018-02-19 PROCEDURE — 80048 BASIC METABOLIC PNL TOTAL CA: CPT

## 2018-02-19 PROCEDURE — 82306 VITAMIN D 25 HYDROXY: CPT

## 2018-02-19 PROCEDURE — 82728 ASSAY OF FERRITIN: CPT

## 2018-03-19 RX ORDER — FAMOTIDINE 10 MG/ML
20 INJECTION, SOLUTION INTRAVENOUS ONCE
Status: CANCELLED | OUTPATIENT
Start: 2018-03-19

## 2018-03-19 RX ORDER — SODIUM CHLORIDE 9 MG/ML
250 INJECTION, SOLUTION INTRAVENOUS ONCE
Status: CANCELLED | OUTPATIENT
Start: 2018-03-19

## 2018-03-19 RX ORDER — DIPHENHYDRAMINE HCL 25 MG
25 CAPSULE ORAL ONCE
Status: CANCELLED | OUTPATIENT
Start: 2018-03-19

## 2018-03-22 RX ORDER — LAMOTRIGINE 150 MG/1
150 TABLET ORAL 2 TIMES DAILY
Qty: 180 TABLET | Refills: 2 | Status: SHIPPED | OUTPATIENT
Start: 2018-03-22 | End: 2018-08-07 | Stop reason: SDUPTHER

## 2018-03-28 ENCOUNTER — INFUSION (OUTPATIENT)
Dept: ONCOLOGY | Facility: HOSPITAL | Age: 48
End: 2018-03-28

## 2018-03-28 VITALS
TEMPERATURE: 98 F | RESPIRATION RATE: 18 BRPM | HEART RATE: 101 BPM | SYSTOLIC BLOOD PRESSURE: 154 MMHG | DIASTOLIC BLOOD PRESSURE: 90 MMHG

## 2018-03-28 DIAGNOSIS — D50.0 IRON DEFICIENCY ANEMIA DUE TO CHRONIC BLOOD LOSS: Primary | ICD-10-CM

## 2018-03-28 PROCEDURE — 96374 THER/PROPH/DIAG INJ IV PUSH: CPT | Performed by: NURSE PRACTITIONER

## 2018-03-28 PROCEDURE — 25010000002 FERUMOXYTOL 510 MG/17ML SOLUTION 510 MG VIAL: Performed by: NURSE PRACTITIONER

## 2018-03-28 PROCEDURE — 96375 TX/PRO/DX INJ NEW DRUG ADDON: CPT

## 2018-03-28 PROCEDURE — 63710000001 DIPHENHYDRAMINE PER 50 MG: Performed by: NURSE PRACTITIONER

## 2018-03-28 RX ORDER — SODIUM CHLORIDE 9 MG/ML
250 INJECTION, SOLUTION INTRAVENOUS ONCE
Status: CANCELLED | OUTPATIENT
Start: 2018-04-04 | End: 2018-04-04

## 2018-03-28 RX ORDER — DIPHENHYDRAMINE HCL 25 MG
25 CAPSULE ORAL ONCE
Status: CANCELLED | OUTPATIENT
Start: 2018-04-04 | End: 2018-04-04

## 2018-03-28 RX ORDER — FAMOTIDINE 10 MG/ML
20 INJECTION, SOLUTION INTRAVENOUS ONCE
Status: COMPLETED | OUTPATIENT
Start: 2018-03-28 | End: 2018-03-28

## 2018-03-28 RX ORDER — DIPHENHYDRAMINE HCL 25 MG
25 CAPSULE ORAL ONCE
Status: COMPLETED | OUTPATIENT
Start: 2018-03-28 | End: 2018-03-28

## 2018-03-28 RX ORDER — FAMOTIDINE 10 MG/ML
20 INJECTION, SOLUTION INTRAVENOUS ONCE
Status: CANCELLED | OUTPATIENT
Start: 2018-04-04 | End: 2018-04-04

## 2018-03-28 RX ORDER — SODIUM CHLORIDE 9 MG/ML
250 INJECTION, SOLUTION INTRAVENOUS ONCE
Status: COMPLETED | OUTPATIENT
Start: 2018-03-28 | End: 2018-03-28

## 2018-03-28 RX ADMIN — DIPHENHYDRAMINE HYDROCHLORIDE 25 MG: 25 CAPSULE ORAL at 13:31

## 2018-03-28 RX ADMIN — SODIUM CHLORIDE 250 ML: 9 INJECTION, SOLUTION INTRAVENOUS at 13:20

## 2018-03-28 RX ADMIN — FAMOTIDINE 20 MG: 10 INJECTION INTRAVENOUS at 13:31

## 2018-03-28 RX ADMIN — FERUMOXYTOL 510 MG: 510 INJECTION INTRAVENOUS at 13:45

## 2018-03-30 ENCOUNTER — HOSPITAL ENCOUNTER (EMERGENCY)
Facility: HOSPITAL | Age: 48
Discharge: HOME | End: 2018-03-31
Attending: EMERGENCY MEDICINE | Admitting: EMERGENCY MEDICINE

## 2018-03-30 ENCOUNTER — APPOINTMENT (OUTPATIENT)
Dept: CT IMAGING | Facility: HOSPITAL | Age: 48
End: 2018-03-30

## 2018-03-30 ENCOUNTER — APPOINTMENT (OUTPATIENT)
Dept: GENERAL RADIOLOGY | Facility: HOSPITAL | Age: 48
End: 2018-03-30

## 2018-03-30 DIAGNOSIS — S52.92XA RADIUS/ULNA FRACTURE, LEFT, CLOSED, INITIAL ENCOUNTER: Primary | ICD-10-CM
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
## 2018-03-30 DIAGNOSIS — S52.202A RADIUS/ULNA FRACTURE, LEFT, CLOSED, INITIAL ENCOUNTER: Primary | ICD-10-CM

## 2018-03-30 PROBLEM — R07.9 CHEST PAIN: Status: ACTIVE | Noted: 2018-03-30

## 2018-03-30 LAB
ALBUMIN SERPL-MCNC: 4.6 G/DL (ref 3.4–4.8)
ALBUMIN/GLOB SERPL: 1.5 G/DL (ref 1.1–1.8)
ALP SERPL-CCNC: 93 U/L (ref 38–126)
ALT SERPL W P-5'-P-CCNC: 19 U/L (ref 9–52)
ANION GAP SERPL CALCULATED.3IONS-SCNC: 13 MMOL/L (ref 5–15)
AST SERPL-CCNC: 32 U/L (ref 14–36)
BACTERIA UR QL AUTO: ABNORMAL /HPF
BASOPHILS # BLD AUTO: 0.02 10*3/MM3 (ref 0–0.2)
BASOPHILS NFR BLD AUTO: 0.2 % (ref 0–2)
BILIRUB SERPL-MCNC: 0.3 MG/DL (ref 0.2–1.3)
BILIRUB UR QL STRIP: NEGATIVE
BUN BLD-MCNC: 17 MG/DL (ref 7–21)
BUN/CREAT SERPL: 12.9 (ref 7–25)
CALCIUM SPEC-SCNC: 10 MG/DL (ref 8.4–10.2)
CHLORIDE SERPL-SCNC: 97 MMOL/L (ref 95–110)
CLARITY UR: CLEAR
CO2 SERPL-SCNC: 31 MMOL/L (ref 22–31)
COLOR UR: YELLOW
CREAT BLD-MCNC: 1.32 MG/DL (ref 0.5–1)
DEPRECATED RDW RBC AUTO: 50 FL (ref 36.4–46.3)
EOSINOPHIL # BLD AUTO: 0.1 10*3/MM3 (ref 0–0.7)
EOSINOPHIL NFR BLD AUTO: 1.1 % (ref 0–7)
ERYTHROCYTE [DISTWIDTH] IN BLOOD BY AUTOMATED COUNT: 16.4 % (ref 11.5–14.5)
GFR SERPL CREATININE-BSD FRML MDRD: 43 ML/MIN/1.73 (ref 60–135)
GLOBULIN UR ELPH-MCNC: 3.1 GM/DL (ref 2.3–3.5)
GLUCOSE BLD-MCNC: 106 MG/DL (ref 60–100)
GLUCOSE UR STRIP-MCNC: NEGATIVE MG/DL
HCT VFR BLD AUTO: 39.8 % (ref 35–45)
HGB BLD-MCNC: 13 G/DL (ref 12–15.5)
HGB UR QL STRIP.AUTO: ABNORMAL
HYALINE CASTS UR QL AUTO: ABNORMAL /LPF
IMM GRANULOCYTES # BLD: 0.08 10*3/MM3 (ref 0–0.02)
IMM GRANULOCYTES NFR BLD: 0.8 % (ref 0–0.5)
KETONES UR QL STRIP: NEGATIVE
LEUKOCYTE ESTERASE UR QL STRIP.AUTO: NEGATIVE
LYMPHOCYTES # BLD AUTO: 1.26 10*3/MM3 (ref 0.6–4.2)
LYMPHOCYTES NFR BLD AUTO: 13.3 % (ref 10–50)
MCH RBC QN AUTO: 27.4 PG (ref 26.5–34)
MCHC RBC AUTO-ENTMCNC: 32.7 G/DL (ref 31.4–36)
MCV RBC AUTO: 83.8 FL (ref 80–98)
MONOCYTES # BLD AUTO: 0.99 10*3/MM3 (ref 0–0.9)
MONOCYTES NFR BLD AUTO: 10.5 % (ref 0–12)
NEUTROPHILS # BLD AUTO: 7 10*3/MM3 (ref 2–8.6)
NEUTROPHILS NFR BLD AUTO: 74.1 % (ref 37–80)
NITRITE UR QL STRIP: NEGATIVE
PH UR STRIP.AUTO: 6.5 [PH] (ref 5–9)
PLATELET # BLD AUTO: 298 10*3/MM3 (ref 150–450)
PMV BLD AUTO: 8.6 FL (ref 8–12)
POTASSIUM BLD-SCNC: 3.9 MMOL/L (ref 3.5–5.1)
PROT SERPL-MCNC: 7.7 G/DL (ref 6.3–8.6)
PROT UR QL STRIP: NEGATIVE
RBC # BLD AUTO: 4.75 10*6/MM3 (ref 3.77–5.16)
RBC # UR: ABNORMAL /HPF
REF LAB TEST METHOD: ABNORMAL
SODIUM BLD-SCNC: 141 MMOL/L (ref 137–145)
SP GR UR STRIP: 1.01 (ref 1–1.03)
SQUAMOUS #/AREA URNS HPF: ABNORMAL /HPF
TROPONIN I SERPL-MCNC: <0.012 NG/ML
UROBILINOGEN UR QL STRIP: ABNORMAL
WBC NRBC COR # BLD: 9.45 10*3/MM3 (ref 3.2–9.8)
WBC UR QL AUTO: ABNORMAL /HPF
WHOLE BLOOD HOLD SPECIMEN: NORMAL

## 2018-03-30 PROCEDURE — 25010000002 MORPHINE PER 10 MG

## 2018-03-30 PROCEDURE — 99284 EMERGENCY DEPT VISIT MOD MDM: CPT

## 2018-03-30 PROCEDURE — 84484 ASSAY OF TROPONIN QUANT: CPT | Performed by: EMERGENCY MEDICINE

## 2018-03-30 PROCEDURE — 70450 CT HEAD/BRAIN W/O DYE: CPT

## 2018-03-30 PROCEDURE — 93010 ELECTROCARDIOGRAM REPORT: CPT | Performed by: INTERNAL MEDICINE

## 2018-03-30 PROCEDURE — 81001 URINALYSIS AUTO W/SCOPE: CPT | Performed by: EMERGENCY MEDICINE

## 2018-03-30 PROCEDURE — 80053 COMPREHEN METABOLIC PANEL: CPT | Performed by: EMERGENCY MEDICINE

## 2018-03-30 PROCEDURE — 73090 X-RAY EXAM OF FOREARM: CPT

## 2018-03-30 PROCEDURE — 96375 TX/PRO/DX INJ NEW DRUG ADDON: CPT

## 2018-03-30 PROCEDURE — 93005 ELECTROCARDIOGRAM TRACING: CPT | Performed by: EMERGENCY MEDICINE

## 2018-03-30 PROCEDURE — 85025 COMPLETE CBC W/AUTO DIFF WBC: CPT | Performed by: EMERGENCY MEDICINE

## 2018-03-30 PROCEDURE — 96374 THER/PROPH/DIAG INJ IV PUSH: CPT

## 2018-03-30 RX ORDER — SODIUM CHLORIDE 0.9 % (FLUSH) 0.9 %
10 SYRINGE (ML) INJECTION AS NEEDED
Status: DISCONTINUED | OUTPATIENT
Start: 2018-03-30 | End: 2018-03-31 | Stop reason: HOSPADM

## 2018-03-30 RX ORDER — MORPHINE SULFATE 8 MG/ML
4 INJECTION INTRAMUSCULAR; INTRAVENOUS; SUBCUTANEOUS ONCE
Status: DISCONTINUED | OUTPATIENT
Start: 2018-03-30 | End: 2018-03-31 | Stop reason: HOSPADM

## 2018-03-30 RX ORDER — HYDROCODONE BITARTRATE AND ACETAMINOPHEN 5; 325 MG/1; MG/1
1 TABLET ORAL EVERY 6 HOURS PRN
Qty: 15 TABLET | Refills: 0 | Status: SHIPPED | OUTPATIENT
Start: 2018-03-30 | End: 2018-04-02 | Stop reason: SDUPTHER

## 2018-03-30 RX ORDER — LIDOCAINE HYDROCHLORIDE AND EPINEPHRINE 10; 10 MG/ML; UG/ML
20 INJECTION, SOLUTION INFILTRATION; PERINEURAL ONCE
Status: COMPLETED | OUTPATIENT
Start: 2018-03-30 | End: 2018-03-30

## 2018-03-30 RX ORDER — LABETALOL HYDROCHLORIDE 5 MG/ML
40 INJECTION, SOLUTION INTRAVENOUS ONCE
Status: COMPLETED | OUTPATIENT
Start: 2018-03-30 | End: 2018-03-30

## 2018-03-30 RX ORDER — MORPHINE SULFATE 2 MG/ML
INJECTION, SOLUTION INTRAMUSCULAR; INTRAVENOUS
Status: COMPLETED
Start: 2018-03-30 | End: 2018-03-30

## 2018-03-30 RX ORDER — ONDANSETRON 4 MG/1
4 TABLET, ORALLY DISINTEGRATING ORAL ONCE
Status: COMPLETED | OUTPATIENT
Start: 2018-03-30 | End: 2018-03-30

## 2018-03-30 RX ADMIN — LIDOCAINE HYDROCHLORIDE,EPINEPHRINE BITARTRATE 20 ML: 10; .01 INJECTION, SOLUTION INFILTRATION; PERINEURAL at 22:06

## 2018-03-30 RX ADMIN — Medication 10 ML: at 22:07

## 2018-03-30 RX ADMIN — ONDANSETRON 4 MG: 4 TABLET, ORALLY DISINTEGRATING ORAL at 22:02

## 2018-03-30 RX ADMIN — MORPHINE SULFATE 4 MG: 2 INJECTION, SOLUTION INTRAMUSCULAR; INTRAVENOUS at 22:01

## 2018-03-30 RX ADMIN — LABETALOL HYDROCHLORIDE 40 MG: 5 INJECTION, SOLUTION INTRAVENOUS at 23:57

## 2018-03-31 VITALS
BODY MASS INDEX: 29.03 KG/M2 | DIASTOLIC BLOOD PRESSURE: 74 MMHG | TEMPERATURE: 98.6 F | RESPIRATION RATE: 18 BRPM | HEIGHT: 67 IN | SYSTOLIC BLOOD PRESSURE: 122 MMHG | HEART RATE: 117 BPM | WEIGHT: 185 LBS | OXYGEN SATURATION: 92 %

## 2018-03-31 RX ORDER — HYDROCODONE BITARTRATE AND ACETAMINOPHEN 7.5; 325 MG/1; MG/1
1 TABLET ORAL ONCE
Status: COMPLETED | OUTPATIENT
Start: 2018-03-31 | End: 2018-03-31

## 2018-03-31 RX ORDER — HYDROCODONE BITARTRATE AND ACETAMINOPHEN 5; 325 MG/1; MG/1
1 TABLET ORAL ONCE
Status: DISCONTINUED | OUTPATIENT
Start: 2018-03-31 | End: 2018-03-31 | Stop reason: HOSPADM

## 2018-03-31 RX ORDER — HYDROCODONE BITARTRATE AND ACETAMINOPHEN 7.5; 325 MG/1; MG/1
TABLET ORAL
Status: COMPLETED
Start: 2018-03-31 | End: 2018-03-31

## 2018-03-31 RX ADMIN — HYDROCODONE BITARTRATE AND ACETAMINOPHEN 1 TABLET: 7.5; 325 TABLET ORAL at 01:31

## 2018-04-01 DIAGNOSIS — S52.552A OTHER CLOSED EXTRA-ARTICULAR FRACTURE OF DISTAL END OF LEFT RADIUS, INITIAL ENCOUNTER: Primary | ICD-10-CM

## 2018-04-01 PROBLEM — S52.502A CLOSED FRACTURE OF LEFT DISTAL RADIUS: Status: ACTIVE | Noted: 2018-04-01

## 2018-04-02 ENCOUNTER — HOSPITAL ENCOUNTER (OUTPATIENT)
Dept: GENERAL RADIOLOGY | Facility: HOSPITAL | Age: 48
Discharge: HOME | End: 2018-04-02
Admitting: ORTHOPAEDIC SURGERY

## 2018-04-02 ENCOUNTER — OFFICE VISIT (OUTPATIENT)
Dept: ORTHOPEDIC SURGERY | Facility: CLINIC | Age: 48
End: 2018-04-02

## 2018-04-02 ENCOUNTER — APPOINTMENT (OUTPATIENT)
Dept: PREADMISSION TESTING | Facility: HOSPITAL | Age: 48
End: 2018-04-02

## 2018-04-02 VITALS
WEIGHT: 181 LBS | HEIGHT: 67 IN | BODY MASS INDEX: 28.41 KG/M2 | SYSTOLIC BLOOD PRESSURE: 116 MMHG | HEART RATE: 129 BPM | RESPIRATION RATE: 18 BRPM | OXYGEN SATURATION: 97 % | DIASTOLIC BLOOD PRESSURE: 78 MMHG

## 2018-04-02 VITALS — WEIGHT: 181 LBS | BODY MASS INDEX: 28.41 KG/M2 | HEIGHT: 67 IN

## 2018-04-02 DIAGNOSIS — R62.50 DEVELOPMENTAL DELAY, PROFOUND: ICD-10-CM

## 2018-04-02 DIAGNOSIS — S52.552A OTHER CLOSED EXTRA-ARTICULAR FRACTURE OF DISTAL END OF LEFT RADIUS, INITIAL ENCOUNTER: Primary | ICD-10-CM

## 2018-04-02 LAB
ANION GAP SERPL CALCULATED.3IONS-SCNC: 18 MMOL/L (ref 5–15)
APTT PPP: 26.6 SECONDS (ref 20–40.3)
BASOPHILS # BLD AUTO: 0.03 10*3/MM3 (ref 0–0.2)
BASOPHILS NFR BLD AUTO: 0.3 % (ref 0–2)
BUN BLD-MCNC: 20 MG/DL (ref 7–21)
BUN/CREAT SERPL: 15.7 (ref 7–25)
CALCIUM SPEC-SCNC: 10.2 MG/DL (ref 8.4–10.2)
CHLORIDE SERPL-SCNC: 97 MMOL/L (ref 95–110)
CO2 SERPL-SCNC: 23 MMOL/L (ref 22–31)
CREAT BLD-MCNC: 1.27 MG/DL (ref 0.5–1)
DEPRECATED RDW RBC AUTO: 49.7 FL (ref 36.4–46.3)
EOSINOPHIL # BLD AUTO: 0.14 10*3/MM3 (ref 0–0.7)
EOSINOPHIL NFR BLD AUTO: 1.5 % (ref 0–7)
ERYTHROCYTE [DISTWIDTH] IN BLOOD BY AUTOMATED COUNT: 16.6 % (ref 11.5–14.5)
GFR SERPL CREATININE-BSD FRML MDRD: 45 ML/MIN/1.73 (ref 60–135)
GLUCOSE BLD-MCNC: 127 MG/DL (ref 60–100)
HCT VFR BLD AUTO: 37.6 % (ref 35–45)
HGB BLD-MCNC: 12.6 G/DL (ref 12–15.5)
IMM GRANULOCYTES # BLD: 0.12 10*3/MM3 (ref 0–0.02)
IMM GRANULOCYTES NFR BLD: 1.3 % (ref 0–0.5)
INR PPP: 1.14 (ref 0.8–1.2)
LYMPHOCYTES # BLD AUTO: 1.08 10*3/MM3 (ref 0.6–4.2)
LYMPHOCYTES NFR BLD AUTO: 11.3 % (ref 10–50)
MCH RBC QN AUTO: 27.8 PG (ref 26.5–34)
MCHC RBC AUTO-ENTMCNC: 33.5 G/DL (ref 31.4–36)
MCV RBC AUTO: 83 FL (ref 80–98)
MONOCYTES # BLD AUTO: 0.9 10*3/MM3 (ref 0–0.9)
MONOCYTES NFR BLD AUTO: 9.4 % (ref 0–12)
MRSA DNA SPEC QL NAA+PROBE: NEGATIVE
NEUTROPHILS # BLD AUTO: 7.3 10*3/MM3 (ref 2–8.6)
NEUTROPHILS NFR BLD AUTO: 76.2 % (ref 37–80)
PLATELET # BLD AUTO: 341 10*3/MM3 (ref 150–450)
PMV BLD AUTO: 8.8 FL (ref 8–12)
POTASSIUM BLD-SCNC: 3.6 MMOL/L (ref 3.5–5.1)
PROTHROMBIN TIME: 14.3 SECONDS (ref 11.1–15.3)
RBC # BLD AUTO: 4.53 10*6/MM3 (ref 3.77–5.16)
SODIUM BLD-SCNC: 138 MMOL/L (ref 137–145)
WBC NRBC COR # BLD: 9.57 10*3/MM3 (ref 3.2–9.8)

## 2018-04-02 PROCEDURE — 36415 COLL VENOUS BLD VENIPUNCTURE: CPT

## 2018-04-02 PROCEDURE — 99205 OFFICE O/P NEW HI 60 MIN: CPT | Performed by: ORTHOPAEDIC SURGERY

## 2018-04-02 PROCEDURE — 85025 COMPLETE CBC W/AUTO DIFF WBC: CPT | Performed by: ORTHOPAEDIC SURGERY

## 2018-04-02 PROCEDURE — 85610 PROTHROMBIN TIME: CPT | Performed by: ORTHOPAEDIC SURGERY

## 2018-04-02 PROCEDURE — 80048 BASIC METABOLIC PNL TOTAL CA: CPT | Performed by: ORTHOPAEDIC SURGERY

## 2018-04-02 PROCEDURE — 71046 X-RAY EXAM CHEST 2 VIEWS: CPT

## 2018-04-02 PROCEDURE — 85730 THROMBOPLASTIN TIME PARTIAL: CPT | Performed by: ORTHOPAEDIC SURGERY

## 2018-04-02 PROCEDURE — 93005 ELECTROCARDIOGRAM TRACING: CPT | Performed by: ORTHOPAEDIC SURGERY

## 2018-04-02 PROCEDURE — 87641 MR-STAPH DNA AMP PROBE: CPT | Performed by: ORTHOPAEDIC SURGERY

## 2018-04-02 PROCEDURE — 93010 ELECTROCARDIOGRAM REPORT: CPT | Performed by: INTERNAL MEDICINE

## 2018-04-02 RX ORDER — SODIUM CHLORIDE, SODIUM GLUCONATE, SODIUM ACETATE, POTASSIUM CHLORIDE, AND MAGNESIUM CHLORIDE 526; 502; 368; 37; 30 MG/100ML; MG/100ML; MG/100ML; MG/100ML; MG/100ML
1000 INJECTION, SOLUTION INTRAVENOUS CONTINUOUS PRN
Status: CANCELLED | OUTPATIENT
Start: 2018-04-03

## 2018-04-02 RX ORDER — FAMOTIDINE 20 MG/1
20 TABLET, FILM COATED ORAL 2 TIMES DAILY
COMMUNITY
End: 2018-08-07 | Stop reason: SDUPTHER

## 2018-04-02 RX ORDER — DIAZEPAM 5 MG/1
5 TABLET ORAL NIGHTLY
COMMUNITY
End: 2018-05-03 | Stop reason: SDUPTHER

## 2018-04-02 RX ORDER — HYDROCODONE BITARTRATE AND ACETAMINOPHEN 5; 325 MG/1; MG/1
1 TABLET ORAL EVERY 6 HOURS PRN
Qty: 80 TABLET | Refills: 0 | Status: SHIPPED | OUTPATIENT
Start: 2018-04-02 | End: 2018-04-03 | Stop reason: DRUGHIGH

## 2018-04-02 NOTE — PROGRESS NOTES
Teodora Whitman is a 47 y.o. female   Primary provider:  Letitia Lea MD       Chief Complaint   Patient presents with   • Left Wrist - Fracture     [facility] 3/30/18       HISTORY OF PRESENT ILLNESS:    Fracture   This is a new problem. Episode onset: 3/30/18, Fell. The problem occurs constantly. Associated symptoms include headaches. Associated symptoms comments: Swelling. Treatments tried: Splint.      Norco 5 are not helping..  She has been taking 2 at a time instead of 1.  That seems to help more than one.  Injured following recent fall, complains of left arm pain, the pain is in the left arm.  The pain is sharp,  The pain is constant.  The pain began suddenly.  The pain does not radiate.  No numbness or tingling.         CONCURRENT MEDICAL HISTORY:    Past Medical History:   Diagnosis Date   • Closed fracture of rib     Closed fracture of rib - LEFT 7TH?    • Hodgkin's disease     lung   • Hypertension    • Mental retardation    • OCD (obsessive compulsive disorder)    • Rib pain on left side    • Seizures        Allergies   Allergen Reactions   • Sulfa Antibiotics          Current Outpatient Prescriptions:   •  citalopram (CeleXA) 40 MG tablet, Take 1 tablet by mouth Daily., Disp: 90 tablet, Rfl: 2  •  diazePAM (VALIUM) 5 MG tablet, Take 1 tablet by mouth At Night As Needed for Anxiety (1/2 tablet). (Patient taking differently: Take 5 mg by mouth At Night As Needed for Anxiety.), Disp: 30 tablet, Rfl: 2  •  famotidine (PEPCID) 10 MG tablet, Take 2 tablets by mouth Daily., Disp: 180 tablet, Rfl: 2  •  guaiFENesin (MUCINEX) 600 MG 12 hr tablet, Take 2 tablets by mouth Every 12 (Twelve) Hours., Disp: 28 tablet, Rfl: 0  •  hydrochlorothiazide (MICROZIDE) 12.5 MG capsule, Take 1 capsule by mouth Daily., Disp: 90 capsule, Rfl: 2  •  HYDROcodone-acetaminophen (NORCO) 5-325 MG per tablet, Take 1 tablet by mouth Every 6 (Six) Hours As Needed for Severe Pain ., Disp: 15 tablet, Rfl: 0  •   "lamoTRIgine (LaMICtal) 150 MG tablet, Take 1 tablet by mouth 2 (Two) Times a Day., Disp: 180 tablet, Rfl: 2  •  megestrol (MEGACE) 40 MG/ML suspension, , Disp: , Rfl:   •  vitamin D (ERGOCALCIFEROL) 31392 units capsule capsule, Take 50,000 Units by mouth Every 14 (Fourteen) Days., Disp: , Rfl:     Past Surgical History:   Procedure Laterality Date   • ENDOSCOPY N/A 8/8/2017    Procedure: ESOPHAGOGASTRODUODENOSCOPY;  Surgeon: Ishmael Mosley DO;  Location: [facility] ENDOSCOPY;  Service:        Family History   Problem Relation Age of Onset   • Anxiety disorder Mother    • Asthma Mother    • Breast cancer Maternal Grandmother    • Lung cancer Maternal Grandfather         Social History     Social History   • Marital status: Single     Spouse name: N/A   • Number of children: N/A   • Years of education: N/A     Occupational History   •       Works at a [employer] workshop in [place]     Social History Main Topics   • Smoking status: Never Smoker   • Smokeless tobacco: Never Used   • Alcohol use No   • Drug use: No   • Sexual activity: Defer     Other Topics Concern   • Not on file     Social History Narrative   • No narrative on file        Review of Systems   Constitutional: Positive for unexpected weight change.   Eyes: Negative.    Respiratory: Negative.    Cardiovascular: Negative.    Gastrointestinal: Negative.    Endocrine: Negative.    Genitourinary: Negative.    Musculoskeletal: Negative.    Skin: Negative.    Allergic/Immunologic: Negative.    Neurological: Positive for dizziness and headaches.   Hematological: Negative.    Psychiatric/Behavioral: The patient is nervous/anxious.        PHYSICAL EXAMINATION:       Ht 170.2 cm (67\")   Wt 82.1 kg (181 lb)   BMI 28.35 kg/m²     Physical Exam   Constitutional: No distress.   Severe cognitive delay.  Non vocal.   HENT:   Head: Normocephalic.   Mouth/Throat: No oropharyngeal exudate.   Eyes: EOM are normal. Pupils are equal, round, and reactive to light.   Neck: No " JVD present. No thyromegaly present.   Cardiovascular: Normal rate and intact distal pulses.    Pulmonary/Chest: No respiratory distress. She has no wheezes.   Neurological: She is alert. She exhibits normal muscle tone.   Skin: Skin is warm and dry. She is not diaphoretic. No erythema.   Psychiatric:   Non verbal  No agitation       GAIT:     [x]  Normal  []  Antalgic    Assistive device: [x]  None  []  Walker     []  Crutches  []  Cane     []  Wheelchair  []  Stretcher    Left Hand Exam     Tenderness   The patient is experiencing tenderness in the dorsal area.     Range of Motion     Wrist   Extension: 0   Flexion: 0     Other   Scars: absent    Comments:  Splint left arm  Brisk capillary refill.              Xr Forearm 2 View Left    Result Date: 3/30/2018  Narrative: Left forearm two view on 3/30/2018 CLINICAL INDICATION: Post reduction, splint COMPARISON: None FINDINGS: Overlying splint obscures some detail. The proximal forearm is not imaged on the lateral view. There is an acute, comminuted, mildly displaced distal radius metaphysis fracture extending into the distal radius diaphysis. There is mild dorsal displacement of the main distal fracture fragment. There is an acute, transverse, displaced distal ulna metaphysis fracture with dorsal displacement and dorsal angulation of the distal fracture fragment. There is osteopenia. No joint effusion is noted in the elbow.     Impression: Acute distal radius and ulna fractures as above. Electronically signed by:  Manuelito Bedolla  3/30/2018 11:25 PM CDT Workstation: [id number]    Ct Head Without Contrast    Result Date: 3/30/2018  Narrative: CT head without contrast on  3/30/2018 CLINICAL INDICATION: Seizures, fall TECHNIQUE: Multiple axial images are obtained throughout the head without the administration of contrast. This exam was performed according to our departmental dose-optimization program, which includes automated exposure control, adjustment of the  mA and/or kV according to patient size and/or use of iterative reconstruction technique. Total DLP is 1344.8 mGy*cm. COMPARISON: None FINDINGS:   There is no hydrocephalus. There is no CT evidence of acute infarct. There is no hemorrhage. There are no abnormal extra-axial fluid collections. There is no mass, mass effect or midline shift. No bony abnormality is noted.     Impression: No acute intracranial abnormality. Electronically signed by:  Manuelito Bedolla  3/30/2018 9:28 PM CDT Workstation: [id number]    Xr Spine Cervical Flex And Ext Only    Result Date: 4/2/2018  Narrative: Xray of cervical spine the lateral view with flexion and extension total of two images.  Diffuse osteopenia.  Disc heights are maintained with discal height.  Atlantoaxial alignment is maintained, no atlantoaxial instability.          ASSESSMENT:    Diagnoses and all orders for this visit:    Other closed extra-articular fracture of distal end of left radius, initial encounter    Developmental delay, profound          PLAN    This fracture requires open reduction internal fixation left forearm, risks of surgery include infection, wound healing complications, non union, malpositioning of instrumentation, loosening, neurologic or vascular injury.    No guarantees are given, I reviewed with them the possibility that subsequent surgery may be necessary at some point in the future.          Puneet Magallon MD

## 2018-04-02 NOTE — DISCHARGE INSTRUCTIONS
[facility]  Pre-op Information and Guidelines    You will be called after 2 p.m. the day before your surgery (Friday for Monday surgery) and notified of your time for arrival and approximate surgery time.  If you have not received a call by 4P.M., please contact Same Day Surgery at [phone] of if outside [facility] call [phone].    Please Follow these Important Safety Guidelines:    • The morning of your procedure, take only the medications listed below with   A sip of water:_____________________________________________       ______________________________________________    • DO NOT eat or drink anything after 12:00 midnight the night before surgery  Specific instructions concerning drinking clear liquids will be discussed during  the pre-surgery instruction call the day before your surgery.    • If you take a blood thinner (ex. Plavix, Coumadin, aspirin), ask your doctor when to stop it before surgery  STOP DATE: _________________    • Only 2 visitors are allowed in patient rooms at a time  Your visitors will be asked to wait in the lobby until the admission process is complete with the exception of a parent with a child and patients in need of special assistance.    • YOU CANNOT DRIVE YOURSELF HOME  You must be accompanied by someone who will be responsible for driving you home after surgery and for your care at home.    • DO NOT chew gum, use breath mints, hard candy, or smoke the day of surgery  • DO NOT drink alcohol for at least 24 hours before your surgery  • DO NOT wear any jewelry and remove all body piercing before coming to the hospital  • DO NOT wear make-up to the hospital  • If you are having surgery on an extremity (arm/leg/foot) remove nail polish/artificial nails on the surgical side  • Clothing, glasses, contacts, dentures, and hairpieces must be removed before surgery  • Bathe the night before or the morning of your surgery and do not use powders/lotions on  skin.

## 2018-04-02 NOTE — PAT
Dr Lees here to review ekg and speak with mother.  No orders received.    Abnormal chest xray reported to dr lees, states he will call dr wood.  No other orders received.

## 2018-04-03 ENCOUNTER — TELEPHONE (OUTPATIENT)
Dept: FAMILY MEDICINE CLINIC | Facility: CLINIC | Age: 48
End: 2018-04-03

## 2018-04-03 ENCOUNTER — OFFICE VISIT (OUTPATIENT)
Dept: FAMILY MEDICINE CLINIC | Facility: CLINIC | Age: 48
End: 2018-04-03

## 2018-04-03 VITALS
HEART RATE: 124 BPM | OXYGEN SATURATION: 95 % | WEIGHT: 181 LBS | DIASTOLIC BLOOD PRESSURE: 78 MMHG | BODY MASS INDEX: 28.41 KG/M2 | HEIGHT: 67 IN | SYSTOLIC BLOOD PRESSURE: 132 MMHG

## 2018-04-03 DIAGNOSIS — R93.89 ABNORMAL CHEST X-RAY: ICD-10-CM

## 2018-04-03 DIAGNOSIS — R00.0 TACHYCARDIA: Primary | ICD-10-CM

## 2018-04-03 DIAGNOSIS — S52.552G OTHER CLOSED EXTRA-ARTICULAR FRACTURE OF DISTAL END OF LEFT RADIUS WITH DELAYED HEALING, SUBSEQUENT ENCOUNTER: ICD-10-CM

## 2018-04-03 PROCEDURE — 99213 OFFICE O/P EST LOW 20 MIN: CPT | Performed by: FAMILY MEDICINE

## 2018-04-03 RX ORDER — DOCUSATE SODIUM 100 MG/1
100 CAPSULE, LIQUID FILLED ORAL DAILY
Qty: 30 CAPSULE | Refills: 1 | Status: SHIPPED | OUTPATIENT
Start: 2018-04-03 | End: 2018-08-21 | Stop reason: ALTCHOICE

## 2018-04-03 RX ORDER — HYDROCODONE BITARTRATE AND ACETAMINOPHEN 10; 325 MG/1; MG/1
1 TABLET ORAL EVERY 6 HOURS PRN
Qty: 40 TABLET | Refills: 0 | Status: SHIPPED | OUTPATIENT
Start: 2018-04-03 | End: 2018-04-17 | Stop reason: DRUGHIGH

## 2018-04-03 NOTE — PROGRESS NOTES
Subjective   Teodora Whitman is a 47 y.o. female.     History of Present Illness   Ms. Whitman is a 46yo female that presents today for follow-up today after issues with her pre-op work-up. She had tachycardia and they did a CXR that was abnormal. She hasn't had any issues with her breathing or chest pain.  She had previous cancer treatment with extensive chemotherapy.  She needed to have her arm repaired and they did pre-op work-up.  She has decided to not have surgery and she is going to wear a cast.      She has had issues with tachycardia since she had her cancer treatment in the past.  Hasn't seemed to bother her at all.  She hasn't seen a cardiologist in the past. She has been asymptomatic.      Current Outpatient Prescriptions:   •  citalopram (CeleXA) 40 MG tablet, Take 1 tablet by mouth Daily., Disp: 90 tablet, Rfl: 2  •  diazePAM (VALIUM) 5 MG tablet, Take 5 mg by mouth Every Night., Disp: , Rfl:   •  famotidine (PEPCID) 20 MG tablet, Take 20 mg by mouth 2 (Two) Times a Day., Disp: , Rfl:   •  hydrochlorothiazide (MICROZIDE) 12.5 MG capsule, Take 1 capsule by mouth Daily., Disp: 90 capsule, Rfl: 2  •  HYDROcodone-acetaminophen (NORCO) 5-325 MG per tablet, Take 1 tablet by mouth Every 6 (Six) Hours As Needed for Severe Pain ., Disp: 80 tablet, Rfl: 0  •  lamoTRIgine (LaMICtal) 150 MG tablet, Take 1 tablet by mouth 2 (Two) Times a Day., Disp: 180 tablet, Rfl: 2  •  megestrol (MEGACE) 40 MG/ML suspension, Take  by mouth Daily. 1 teaaspoon, Disp: , Rfl:   •  vitamin D (ERGOCALCIFEROL) 21853 units capsule capsule, Take 50,000 Units by mouth Every 14 (Fourteen) Days., Disp: , Rfl:     The following portions of the patient's history were reviewed and updated as appropriate: allergies, current medications, past family history, past medical history, past social history, past surgical history and problem list.    Review of Systems   Constitutional: Positive for activity change. Negative for appetite change, fatigue  "and unexpected weight change.   Respiratory: Negative for cough, shortness of breath and wheezing.    Cardiovascular: Positive for palpitations. Negative for chest pain and leg swelling.   Gastrointestinal: Negative for abdominal distention, abdominal pain, constipation, diarrhea, nausea and vomiting.   Musculoskeletal: Positive for arthralgias.   Psychiatric/Behavioral: Positive for agitation and behavioral problems. Negative for sleep disturbance.       Objective    Vitals:    04/03/18 1447   BP: 132/78   Pulse: (!) 124   SpO2: 95%   Weight: 82.1 kg (181 lb)   Height: 170.2 cm (67\")     Physical Exam   Constitutional: She is oriented to person, place, and time. She appears well-developed and well-nourished. No distress.   Cardiovascular: Regular rhythm and normal heart sounds.    No murmur heard.  +tachycardia   Pulmonary/Chest: Effort normal and breath sounds normal. No respiratory distress. She has no wheezes.   Abdominal: Soft. Bowel sounds are normal. She exhibits no distension. There is no tenderness.   Musculoskeletal:   Left arm in cast.     Neurological: She is alert and oriented to person, place, and time.   Psychiatric: She has a normal mood and affect.   Nursing note and vitals reviewed.      Assessment/Plan   Problems Addressed this Visit        Musculoskeletal and Integument    Closed fracture of left distal radius       Other    Abnormal chest x-ray      Other Visit Diagnoses     Tachycardia    -  Primary        I had discussed patient with Dr. Magallon, orthopedics, with the concern of her CXR findings.  Reviewed chest CT that was done in 2015 and that was comparable to the CXR findings. I think that it is scarring from her previous radiation treatment. She isn't having any issues with hypoxia or SOA.  She has decided that she doesn't want to have surgery. Her and her mother think that it would be best to pursue non-surgical intervention at this time.  No follow-up with ortho has been set up yet.  " Will see about getting her in.  She has been in a lot of pain and with her disabilities has had some issues with behavior issues.  Will try to manage her pain better. I will give her some pain medication to help since she is planning on having her arm heal without surgery.  Will also start on colace to prevent her from becoming constipated since she may not say anything to her mother and that could cause behavior issues.  She has had issues with tachycardia for years. Will put her on a beta blocker after she is off her pain medication. It is sedating to her and I don't think that it is emergent.    RTC in 0.5 months or sooner PRN

## 2018-04-04 ENCOUNTER — APPOINTMENT (OUTPATIENT)
Dept: ONCOLOGY | Facility: HOSPITAL | Age: 48
End: 2018-04-04

## 2018-04-06 ENCOUNTER — OFFICE VISIT (OUTPATIENT)
Dept: ORTHOPEDIC SURGERY | Facility: CLINIC | Age: 48
End: 2018-04-06

## 2018-04-06 VITALS — HEIGHT: 67 IN | BODY MASS INDEX: 28.41 KG/M2 | WEIGHT: 181 LBS

## 2018-04-06 DIAGNOSIS — R62.50 DEVELOPMENTAL DELAY, PROFOUND: ICD-10-CM

## 2018-04-06 DIAGNOSIS — S52.552A OTHER CLOSED EXTRA-ARTICULAR FRACTURE OF DISTAL END OF LEFT RADIUS, INITIAL ENCOUNTER: Primary | ICD-10-CM

## 2018-04-06 PROCEDURE — 25605 CLTX DST RDL FX/EPHYS SEP W/: CPT | Performed by: ORTHOPAEDIC SURGERY

## 2018-04-06 PROCEDURE — 99213 OFFICE O/P EST LOW 20 MIN: CPT | Performed by: ORTHOPAEDIC SURGERY

## 2018-04-06 NOTE — PROGRESS NOTES
"The patient is a 47 y.o. female who presents for followup.    Chief Complaint   Patient presents with   • Left Wrist - Follow-up, Fracture       HPI:  Patient seen by Dr. Lea on 4/3/2018 due to tachycardia   Patient and family does not wish to have surgical treatment.          Current Outpatient Prescriptions:   •  citalopram (CeleXA) 40 MG tablet, Take 1 tablet by mouth Daily., Disp: 90 tablet, Rfl: 2  •  diazePAM (VALIUM) 5 MG tablet, Take 5 mg by mouth Every Night., Disp: , Rfl:   •  docusate sodium (COLACE) 100 MG capsule, Take 1 capsule by mouth Daily., Disp: 30 capsule, Rfl: 1  •  famotidine (PEPCID) 20 MG tablet, Take 20 mg by mouth 2 (Two) Times a Day., Disp: , Rfl:   •  hydrochlorothiazide (MICROZIDE) 12.5 MG capsule, Take 1 capsule by mouth Daily., Disp: 90 capsule, Rfl: 2  •  HYDROcodone-acetaminophen (NORCO)  MG per tablet, Take 1 tablet by mouth Every 6 (Six) Hours As Needed for Moderate Pain ., Disp: 40 tablet, Rfl: 0  •  lamoTRIgine (LaMICtal) 150 MG tablet, Take 1 tablet by mouth 2 (Two) Times a Day., Disp: 180 tablet, Rfl: 2  •  megestrol (MEGACE) 40 MG/ML suspension, Take  by mouth Daily. 1 teaaspoon, Disp: , Rfl:   •  vitamin D (ERGOCALCIFEROL) 54762 units capsule capsule, Take 50,000 Units by mouth Every 14 (Fourteen) Days., Disp: , Rfl:     Allergies   Allergen Reactions   • Sulfa Antibiotics Nausea And Vomiting       ROS:  No fevers or chills.  No nausea or vomiting    PHYSICAL EXAM:    Vitals:    04/06/18 1053   Weight: 82.1 kg (181 lb)   Height: 170.2 cm (67\")       GAIT:     []  Normal  []  Antalgic    Assistive device: []  None  []  Walker     []  Crutches  []  Cane     []  Wheelchair  []  Stretcher    Patient is awake and alert, answers questions appropriately, and is in no apparent distress.    Extensive bruising of the left forearm,   + swelling.        ASSESSMENT:  Diagnoses and all orders for this visit:    Other closed extra-articular fracture of distal end of left radius, " initial encounter    Developmental delay, profound        PLAN:    Cast for non operative treatment.    Xray left forearm, change cast.      Puneet Magallon MD

## 2018-04-17 ENCOUNTER — OFFICE VISIT (OUTPATIENT)
Dept: FAMILY MEDICINE CLINIC | Facility: CLINIC | Age: 48
End: 2018-04-17

## 2018-04-17 VITALS
OXYGEN SATURATION: 96 % | SYSTOLIC BLOOD PRESSURE: 120 MMHG | HEIGHT: 67 IN | BODY MASS INDEX: 27.83 KG/M2 | DIASTOLIC BLOOD PRESSURE: 88 MMHG | WEIGHT: 177.3 LBS | HEART RATE: 123 BPM

## 2018-04-17 DIAGNOSIS — R00.0 TACHYCARDIA: Primary | ICD-10-CM

## 2018-04-17 DIAGNOSIS — S52.552G OTHER CLOSED EXTRA-ARTICULAR FRACTURE OF DISTAL END OF LEFT RADIUS WITH DELAYED HEALING, SUBSEQUENT ENCOUNTER: ICD-10-CM

## 2018-04-17 PROCEDURE — 99213 OFFICE O/P EST LOW 20 MIN: CPT | Performed by: FAMILY MEDICINE

## 2018-04-17 RX ORDER — HYDROCODONE BITARTRATE AND ACETAMINOPHEN 5; 325 MG/1; MG/1
1 TABLET ORAL 3 TIMES DAILY PRN
Qty: 30 TABLET | Refills: 0 | Status: SHIPPED | OUTPATIENT
Start: 2018-04-17 | End: 2018-08-07

## 2018-04-17 RX ORDER — METOPROLOL SUCCINATE 25 MG/1
25 TABLET, EXTENDED RELEASE ORAL DAILY
Qty: 30 TABLET | Refills: 2 | Status: SHIPPED | OUTPATIENT
Start: 2018-04-17 | End: 2018-07-08 | Stop reason: SDUPTHER

## 2018-04-17 NOTE — PROGRESS NOTES
Subjective   Teodora Whitman is a 47 y.o. female.     History of Present Illness   Ms. Whitman is a 46yo female that presents today with her mother for follow-up on tachycardia and pain with her arm.    Mom says that she has been using less pain medication since she was here last time.  She has been breaking her pills in half. She has been taking 1-2 pills a day only now.    She has continued to have tachycardia. That has been a chronic issue for her.  She hasn't been on medication with that before.  She has had some issues with sedation with the pain medications.  We had decided to hold off. She has had less sedation with the lower doses.        Current Outpatient Prescriptions:   •  citalopram (CeleXA) 40 MG tablet, Take 1 tablet by mouth Daily., Disp: 90 tablet, Rfl: 2  •  diazePAM (VALIUM) 5 MG tablet, Take 5 mg by mouth Every Night., Disp: , Rfl:   •  docusate sodium (COLACE) 100 MG capsule, Take 1 capsule by mouth Daily., Disp: 30 capsule, Rfl: 1  •  famotidine (PEPCID) 20 MG tablet, Take 20 mg by mouth 2 (Two) Times a Day., Disp: , Rfl:   •  hydrochlorothiazide (MICROZIDE) 12.5 MG capsule, Take 1 capsule by mouth Daily., Disp: 90 capsule, Rfl: 2  •  HYDROcodone-acetaminophen (NORCO)  MG per tablet, Take 1 tablet by mouth Every 6 (Six) Hours As Needed for Moderate Pain ., Disp: 40 tablet, Rfl: 0  •  lamoTRIgine (LaMICtal) 150 MG tablet, Take 1 tablet by mouth 2 (Two) Times a Day., Disp: 180 tablet, Rfl: 2  •  megestrol (MEGACE) 40 MG/ML suspension, Take  by mouth Daily. 1 teaaspoon, Disp: , Rfl:   •  vitamin D (ERGOCALCIFEROL) 14421 units capsule capsule, Take 50,000 Units by mouth Every 14 (Fourteen) Days., Disp: , Rfl:     The following portions of the patient's history were reviewed and updated as appropriate: allergies, current medications, past family history, past medical history, past social history, past surgical history and problem list.    Review of Systems   Constitutional: Negative for  "activity change, appetite change, fatigue and unexpected weight change.   Eyes: Negative for visual disturbance.   Respiratory: Negative for cough, shortness of breath and wheezing.    Cardiovascular: Positive for palpitations. Negative for chest pain and leg swelling.   Gastrointestinal: Negative for abdominal distention, abdominal pain, constipation, diarrhea, nausea and vomiting.   Musculoskeletal: Positive for arthralgias. Negative for back pain, gait problem and joint swelling.   Skin: Negative for pallor, rash and wound.   Neurological: Negative for headaches.   Psychiatric/Behavioral: Negative for dysphoric mood and sleep disturbance. The patient is not nervous/anxious.        Objective    Vitals:    04/17/18 1557   BP: 120/88   Pulse: (!) 123   SpO2: 96%   Weight: 80.4 kg (177 lb 4.8 oz)   Height: 170.2 cm (67\")       Physical Exam   Constitutional: She is oriented to person, place, and time. She appears well-developed and well-nourished. No distress.   Cardiovascular: Regular rhythm and normal heart sounds.    No murmur heard.  No LE edema.  +tachycardia   Pulmonary/Chest: Effort normal and breath sounds normal. No respiratory distress.   Abdominal: Soft. Bowel sounds are normal. She exhibits no distension. There is no tenderness.   Neurological: She is alert and oriented to person, place, and time.   Psychiatric: She has a normal mood and affect. Her behavior is normal. Judgment and thought content normal.   Nursing note and vitals reviewed.      Assessment/Plan   Problems Addressed this Visit        Cardiovascular and Mediastinum    Tachycardia - Primary       Musculoskeletal and Integument    Closed fracture of left distal radius      Other Visit Diagnoses    None.       1.) Tachycardia-  She is less sedated now. Will start on low dose beta blocker and see if that helps tachycardia.  Will continue to monitor.  2.) Radial fracture- Pain is getting better. She has been taking pain medication TID and taking " half.  Will give another 10 days of 5MG and likely will not need more pain medication after that. If she does will do UDS.  Follow-up as scheduled in 2 weeks.

## 2018-04-29 ENCOUNTER — HOSPITAL ENCOUNTER (EMERGENCY)
Facility: HOSPITAL | Age: 48
Discharge: HOME | End: 2018-04-29
Attending: EMERGENCY MEDICINE | Admitting: EMERGENCY MEDICINE

## 2018-04-29 VITALS
HEIGHT: 67 IN | BODY MASS INDEX: 27.78 KG/M2 | DIASTOLIC BLOOD PRESSURE: 85 MMHG | WEIGHT: 177 LBS | TEMPERATURE: 98.2 F | OXYGEN SATURATION: 96 % | SYSTOLIC BLOOD PRESSURE: 156 MMHG | HEART RATE: 112 BPM | RESPIRATION RATE: 20 BRPM

## 2018-04-29 DIAGNOSIS — S52.592A OTHER CLOSED FRACTURE OF DISTAL END OF LEFT RADIUS, INITIAL ENCOUNTER: Primary | ICD-10-CM

## 2018-04-29 PROCEDURE — 99282 EMERGENCY DEPT VISIT SF MDM: CPT

## 2018-04-29 PROCEDURE — 99283 EMERGENCY DEPT VISIT LOW MDM: CPT

## 2018-04-30 ENCOUNTER — OFFICE VISIT (OUTPATIENT)
Dept: ORTHOPEDIC SURGERY | Facility: CLINIC | Age: 48
End: 2018-04-30

## 2018-04-30 VITALS — BODY MASS INDEX: 27.62 KG/M2 | WEIGHT: 176 LBS | HEIGHT: 67 IN

## 2018-04-30 DIAGNOSIS — S52.692D OTHER CLOSED FRACTURE OF DISTAL END OF LEFT ULNA WITH ROUTINE HEALING, SUBSEQUENT ENCOUNTER: ICD-10-CM

## 2018-04-30 DIAGNOSIS — S52.572D OTHER CLOSED INTRA-ARTICULAR FRACTURE OF DISTAL END OF LEFT RADIUS WITH ROUTINE HEALING, SUBSEQUENT ENCOUNTER: Primary | ICD-10-CM

## 2018-04-30 DIAGNOSIS — R62.50 DEVELOPMENTAL DELAY, PROFOUND: ICD-10-CM

## 2018-04-30 DIAGNOSIS — S52.552G OTHER CLOSED EXTRA-ARTICULAR FRACTURE OF DISTAL END OF LEFT RADIUS WITH DELAYED HEALING, SUBSEQUENT ENCOUNTER: Primary | ICD-10-CM

## 2018-04-30 PROCEDURE — 29075 APPL CST ELBW FNGR SHORT ARM: CPT | Performed by: NURSE PRACTITIONER

## 2018-04-30 PROCEDURE — 99024 POSTOP FOLLOW-UP VISIT: CPT | Performed by: NURSE PRACTITIONER

## 2018-05-01 PROBLEM — S52.602D CLOSED FRACTURE OF LOWER END OF LEFT ULNA WITH ROUTINE HEALING: Status: ACTIVE | Noted: 2018-05-01

## 2018-05-03 ENCOUNTER — OFFICE VISIT (OUTPATIENT)
Dept: FAMILY MEDICINE CLINIC | Facility: CLINIC | Age: 48
End: 2018-05-03

## 2018-05-03 VITALS
WEIGHT: 176 LBS | HEART RATE: 105 BPM | HEIGHT: 67 IN | SYSTOLIC BLOOD PRESSURE: 116 MMHG | DIASTOLIC BLOOD PRESSURE: 72 MMHG | BODY MASS INDEX: 27.62 KG/M2

## 2018-05-03 DIAGNOSIS — F42.9 OBSESSIVE-COMPULSIVE DISORDER, UNSPECIFIED TYPE: Primary | ICD-10-CM

## 2018-05-03 DIAGNOSIS — R00.0 TACHYCARDIA: ICD-10-CM

## 2018-05-03 DIAGNOSIS — S52.572D OTHER CLOSED INTRA-ARTICULAR FRACTURE OF DISTAL END OF LEFT RADIUS WITH ROUTINE HEALING, SUBSEQUENT ENCOUNTER: ICD-10-CM

## 2018-05-03 PROCEDURE — 99213 OFFICE O/P EST LOW 20 MIN: CPT | Performed by: FAMILY MEDICINE

## 2018-05-03 RX ORDER — DIAZEPAM 5 MG/1
5 TABLET ORAL NIGHTLY
Qty: 30 TABLET | Refills: 2 | Status: SHIPPED | OUTPATIENT
Start: 2018-05-03 | End: 2018-08-07 | Stop reason: SDUPTHER

## 2018-05-03 NOTE — PROGRESS NOTES
Subjective   Teodora Whitman is a 47 y.o. female.     History of Present Illness   Ms. Whitman is a 48yo female with mental retardation, mood disorder, and tachycardia that presents today with her mother for follow-up.  She has recently been to the ED because her cast came off. Had the cast reapplied and she has been doing well. She hasn't been acting like she was in a lot of pain. Since the cast fell off she has had more pain. She had been not taking the pain medication and hasn't needed much.  She has not been taking it daily.  She has some at home.    She was started on a beta blocker for her tachycardia last time that she was here. Has been well tolerated and she hasn't had any issues.  She hasn't had any chest pain and this issues started after her chemo treatment.  They don't want to go to a heart doctor.    She has been taking Valium for mood issues and agitation. She has been taking it once a day and that has been working well. She hasn't had a lot of agitation.        Current Outpatient Prescriptions:   •  citalopram (CeleXA) 40 MG tablet, Take 1 tablet by mouth Daily., Disp: 90 tablet, Rfl: 2  •  diazePAM (VALIUM) 5 MG tablet, Take 5 mg by mouth Every Night., Disp: , Rfl:   •  docusate sodium (COLACE) 100 MG capsule, Take 1 capsule by mouth Daily., Disp: 30 capsule, Rfl: 1  •  famotidine (PEPCID) 20 MG tablet, Take 20 mg by mouth 2 (Two) Times a Day., Disp: , Rfl:   •  hydrochlorothiazide (MICROZIDE) 12.5 MG capsule, Take 1 capsule by mouth Daily., Disp: 90 capsule, Rfl: 2  •  HYDROcodone-acetaminophen (NORCO) 5-325 MG per tablet, Take 1 tablet by mouth 3 (Three) Times a Day As Needed for Moderate Pain ., Disp: 30 tablet, Rfl: 0  •  lamoTRIgine (LaMICtal) 150 MG tablet, Take 1 tablet by mouth 2 (Two) Times a Day., Disp: 180 tablet, Rfl: 2  •  megestrol (MEGACE) 40 MG/ML suspension, Take  by mouth Daily. 1 teaaspoon, Disp: , Rfl:   •  metoprolol succinate XL (TOPROL XL) 25 MG 24 hr tablet, Take 1 tablet by  "mouth Daily., Disp: 30 tablet, Rfl: 2  •  vitamin D (ERGOCALCIFEROL) 42060 units capsule capsule, Take 50,000 Units by mouth Every 14 (Fourteen) Days., Disp: , Rfl:     The following portions of the patient's history were reviewed and updated as appropriate: allergies, current medications, past family history, past medical history, past social history, past surgical history and problem list.    Review of Systems   Constitutional: Negative for activity change, appetite change, fatigue and unexpected weight change.   Eyes: Negative for visual disturbance.   Respiratory: Negative for cough, shortness of breath and wheezing.    Cardiovascular: Negative for chest pain, palpitations and leg swelling.   Gastrointestinal: Negative for abdominal distention, abdominal pain, constipation, diarrhea, nausea and vomiting.   Musculoskeletal: Positive for arthralgias. Negative for back pain, gait problem and joint swelling.   Skin: Negative for pallor, rash and wound.   Neurological: Negative for headaches.   Psychiatric/Behavioral: Positive for agitation. Negative for dysphoric mood, self-injury, sleep disturbance and suicidal ideas. The patient is nervous/anxious.        Objective    Vitals:    05/03/18 1300   BP: 116/72   Pulse: 105   Weight: 79.8 kg (176 lb)   Height: 170.2 cm (67\")       Physical Exam   Constitutional: She is oriented to person, place, and time. She appears well-developed and well-nourished. No distress.   Cardiovascular: Normal rate, regular rhythm and normal heart sounds.    No murmur heard.  No LE edema.     Pulmonary/Chest: Effort normal and breath sounds normal. No respiratory distress.   Abdominal: Soft. Bowel sounds are normal. She exhibits no distension. There is no tenderness.   Neurological: She is alert and oriented to person, place, and time.   Psychiatric: She has a normal mood and affect. Her behavior is normal. Judgment and thought content normal.   Nursing note and vitals " reviewed.      Assessment/Plan   Problems Addressed this Visit        Cardiovascular and Mediastinum    Tachycardia       Musculoskeletal and Integument    Closed fracture of left distal radius       Other    Obsessive-compulsive disorder - Primary    Relevant Medications    diazePAM (VALIUM) 5 MG tablet      Other Visit Diagnoses    None.       She has been doing better and not needing pain medication. She has some at home and doesn't need more at this time. Cast in place. Had slipped off and she had it replaced in the ED.  Follow-up with ortho as planned.  Her tachycardia is a little better with a beta-blocker and she has been not having any issues with hypotension. Continue that medication at current dose for now.  Will continue valium for her agitation at night. UDS in chart reviewed. The patient has read and signed the [facility] Controlled Substance Contract.  I will continue to see patient for regular follow up appointments.  They are well controlled on their medication.  ALE has been reviewed by me and is updated every 3 months. The patient is aware of the potential for addiction and dependence.  RTC in 3 months or sooner PRN

## 2018-05-08 ENCOUNTER — OFFICE VISIT (OUTPATIENT)
Dept: ORTHOPEDIC SURGERY | Facility: CLINIC | Age: 48
End: 2018-05-08

## 2018-05-08 VITALS — WEIGHT: 176 LBS | HEIGHT: 67 IN | BODY MASS INDEX: 27.62 KG/M2

## 2018-05-08 DIAGNOSIS — R62.50 DEVELOPMENTAL DELAY, PROFOUND: ICD-10-CM

## 2018-05-08 DIAGNOSIS — S52.552G OTHER CLOSED EXTRA-ARTICULAR FRACTURE OF DISTAL END OF LEFT RADIUS WITH DELAYED HEALING, SUBSEQUENT ENCOUNTER: Primary | ICD-10-CM

## 2018-05-08 PROCEDURE — 99024 POSTOP FOLLOW-UP VISIT: CPT | Performed by: NURSE PRACTITIONER

## 2018-05-08 PROCEDURE — 29075 APPL CST ELBW FNGR SHORT ARM: CPT | Performed by: NURSE PRACTITIONER

## 2018-05-08 NOTE — PROGRESS NOTES
Teodora Whitman is a 48 y.o. female returns for     Chief Complaint   Patient presents with   • Left Wrist - Follow-up       HISTORY OF PRESENT ILLNESS: Patient presents to office accompanied by her mother for application of a new cast. Patient had a new cast applied on 4/30/2018. Mother reports her cast has loosened and came off again. Patient is wearing her cast today but is able to slide off the cast. Initial injury of left wrist occurred on 3/30/2018 due to a fall at her home. No other complaints or concerns are noted.      CONCURRENT MEDICAL HISTORY:    Past Medical History:   Diagnosis Date   • Acid reflux    • Anemia    • Closed fracture of rib     Closed fracture of rib - LEFT 7TH?    • Hodgkin's disease    • Hypertension    • Mental retardation    • OCD (obsessive compulsive disorder)    • Rib pain on left side    • Seizures     last one at age 8       Allergies   Allergen Reactions   • Sulfa Antibiotics Nausea And Vomiting         Current Outpatient Prescriptions:   •  citalopram (CeleXA) 40 MG tablet, Take 1 tablet by mouth Daily., Disp: 90 tablet, Rfl: 2  •  diazePAM (VALIUM) 5 MG tablet, Take 1 tablet by mouth Every Night., Disp: 30 tablet, Rfl: 2  •  docusate sodium (COLACE) 100 MG capsule, Take 1 capsule by mouth Daily., Disp: 30 capsule, Rfl: 1  •  famotidine (PEPCID) 20 MG tablet, Take 20 mg by mouth 2 (Two) Times a Day., Disp: , Rfl:   •  hydrochlorothiazide (MICROZIDE) 12.5 MG capsule, Take 1 capsule by mouth Daily., Disp: 90 capsule, Rfl: 2  •  HYDROcodone-acetaminophen (NORCO) 5-325 MG per tablet, Take 1 tablet by mouth 3 (Three) Times a Day As Needed for Moderate Pain ., Disp: 30 tablet, Rfl: 0  •  lamoTRIgine (LaMICtal) 150 MG tablet, Take 1 tablet by mouth 2 (Two) Times a Day., Disp: 180 tablet, Rfl: 2  •  megestrol (MEGACE) 40 MG/ML suspension, Take  by mouth Daily. 1 teaaspoon, Disp: , Rfl:   •  metoprolol succinate XL (TOPROL XL) 25 MG 24 hr tablet, Take 1 tablet by mouth Daily., Disp:  "30 tablet, Rfl: 2  •  vitamin D (ERGOCALCIFEROL) 86765 units capsule capsule, Take 50,000 Units by mouth Every 14 (Fourteen) Days., Disp: , Rfl:     Past Surgical History:   Procedure Laterality Date   • ENDOSCOPY N/A 8/8/2017    Procedure: ESOPHAGOGASTRODUODENOSCOPY;  Surgeon: Ishmael Mosley DO;  Location: [facility] ENDOSCOPY;  Service:    • LUNG BIOPSY         ROS  No fevers or chills.  No chest pain or shortness of air.  No GI or  disturbances. Left wrist pain.     PHYSICAL EXAMINATION:       Ht 170.2 cm (67\")   Wt 79.8 kg (176 lb)   BMI 27.57 kg/m²     Physical Exam   Constitutional: She is oriented to person, place, and time. Vital signs are normal. She appears well-developed and well-nourished. She is active and cooperative. She does not appear ill. No distress.   HENT:   Head: Normocephalic.   Pulmonary/Chest: Effort normal. No respiratory distress.   Abdominal: Soft. She exhibits no distension.   Musculoskeletal: She exhibits edema (Left wrist) and tenderness (Left wrist).   Neurological: She is alert and oriented to person, place, and time. GCS eye subscore is 4. GCS verbal subscore is 5. GCS motor subscore is 6.   Skin: Skin is warm, dry and intact. Capillary refill takes less than 2 seconds.   Psychiatric: Judgment and thought content normal. Her affect is blunt. Her speech is delayed. She is slowed. Cognition and memory are impaired (Developmental delays, cognitive limitations (chronic)).   Vitals reviewed.      GAIT:     [x]  Normal  []  Antalgic    Assistive device: [x]  None  []  Walker     []  Crutches  []  Cane     []  Wheelchair  []  Stretcher    Right Hand Exam     Tenderness   The patient is experiencing no tenderness.         Range of Motion   The patient has normal right wrist ROM.     Muscle Strength   The patient has normal right wrist strength.    Other   Erythema: absent  Sensation: normal  Pulse: present      Left Hand Exam     Tenderness   Left hand tenderness location: Diffuse. "     Range of Motion     Wrist   Extension: abnormal   Flexion: abnormal   Pronation: abnormal   Supination: abnormal     Other   Erythema: absent  Sensation: normal  Pulse: present    Comments:  Mild, generalized swelling to the wrist. No deformity. No ecchymosis. Skin is intact. Capillary refill is less than 3 seconds. Range of motion and strength assessments deferred due to acute distal radius fracture.             Xr Forearm 2 View Left    Result Date: 5/1/2018  Narrative: AP and lateral views of the left forearm reveal a stable, comminuted and mildly displaced distal radius fracture.  There is mild dorsal displacement of the distal fracture fragment.  There is also a stable, transverse and mildly displaced distal ulna fracture.  Overall alignment remains acceptable.  There is some slight increase in the degree of displacement of the ulna fracture when compared with previous images from 3/30/2018, which may be attributable to positioning differences.  There is slight evidence of healing noted around the distal radius fracture site when compared with previous images from 3/30/2018. There is mild surrounding soft tissue swelling noted. 05/01/18 at 3:33 PM by LOLIS Davis         ASSESSMENT:    Diagnoses and all orders for this visit:    Other closed extra-articular fracture of distal end of left radius with delayed healing, subsequent encounter    Developmental delay, profound    PLAN    Patient is brought to office today by her mother for another cast placement. Patient had a new cast placed on 4/30/2018. Again, the cast has loosened and the patient is able to slide her cast off. This is likely due to continued improvement in swelling. A new, short arm fiberglass cast is placed today for immobilization of her distal radius fracture. Cast care reviewed with mother. Follow up with Dr. Magallon in 3 weeks as scheduled.     Return in about 3 weeks (around 5/29/2018) for Recheck with Dr. Magallon, as scheduled.       This document has been electronically signed by LOLIS Davis on May 13, 2018 6:45 PM      LOLIS Davis

## 2018-05-29 DIAGNOSIS — S52.552G OTHER CLOSED EXTRA-ARTICULAR FRACTURE OF DISTAL END OF LEFT RADIUS WITH DELAYED HEALING, SUBSEQUENT ENCOUNTER: Primary | ICD-10-CM

## 2018-05-30 ENCOUNTER — OFFICE VISIT (OUTPATIENT)
Dept: ORTHOPEDIC SURGERY | Facility: CLINIC | Age: 48
End: 2018-05-30

## 2018-05-30 VITALS — BODY MASS INDEX: 27.62 KG/M2 | WEIGHT: 176 LBS | HEIGHT: 67 IN

## 2018-05-30 DIAGNOSIS — S52.502D CLOSED FRACTURE OF DISTAL END OF LEFT RADIUS WITH ROUTINE HEALING, UNSPECIFIED FRACTURE MORPHOLOGY, SUBSEQUENT ENCOUNTER: Primary | ICD-10-CM

## 2018-05-30 PROCEDURE — 99024 POSTOP FOLLOW-UP VISIT: CPT | Performed by: ORTHOPAEDIC SURGERY

## 2018-07-09 RX ORDER — METOPROLOL SUCCINATE 25 MG/1
TABLET, EXTENDED RELEASE ORAL
Qty: 30 TABLET | Refills: 2 | Status: SHIPPED | OUTPATIENT
Start: 2018-07-09 | End: 2018-08-07 | Stop reason: SDUPTHER

## 2018-07-10 DIAGNOSIS — S52.502D CLOSED FRACTURE OF DISTAL END OF LEFT RADIUS WITH ROUTINE HEALING, UNSPECIFIED FRACTURE MORPHOLOGY, SUBSEQUENT ENCOUNTER: Primary | ICD-10-CM

## 2018-07-11 ENCOUNTER — OFFICE VISIT (OUTPATIENT)
Dept: ORTHOPEDIC SURGERY | Facility: CLINIC | Age: 48
End: 2018-07-11

## 2018-07-11 VITALS — HEIGHT: 67 IN | BODY MASS INDEX: 28.88 KG/M2 | WEIGHT: 184 LBS

## 2018-07-11 DIAGNOSIS — S52.622D CLOSED TORUS FRACTURE OF DISTAL END OF LEFT ULNA WITH ROUTINE HEALING, SUBSEQUENT ENCOUNTER: Primary | ICD-10-CM

## 2018-07-11 PROCEDURE — 99213 OFFICE O/P EST LOW 20 MIN: CPT | Performed by: ORTHOPAEDIC SURGERY

## 2018-07-11 NOTE — PROGRESS NOTES
Teodora Whitman is a 48 y.o. female returns for     Chief Complaint   Patient presents with   • Left Wrist - Follow-up       HISTORY OF PRESENT ILLNESS: 6 week follow up with x-rays done today in office  Pain scale today 0/10  She is doing well, she has progressed with activity of the left arm,  Minimal to no pain, she has increased usage of the left arm appropriately.       CONCURRENT MEDICAL HISTORY:    Past Medical History:   Diagnosis Date   • Acid reflux    • Anemia    • Closed fracture of rib     Closed fracture of rib - LEFT 7TH?    • Hodgkin's disease (CMS/HCC)    • Hypertension    • Mental retardation    • OCD (obsessive compulsive disorder)    • Rib pain on left side    • Seizures (CMS/HCC)     last one at age 8       Allergies   Allergen Reactions   • Sulfa Antibiotics Nausea And Vomiting         Current Outpatient Prescriptions:   •  citalopram (CeleXA) 40 MG tablet, Take 1 tablet by mouth Daily., Disp: 90 tablet, Rfl: 2  •  diazePAM (VALIUM) 5 MG tablet, Take 1 tablet by mouth Every Night., Disp: 30 tablet, Rfl: 2  •  docusate sodium (COLACE) 100 MG capsule, Take 1 capsule by mouth Daily., Disp: 30 capsule, Rfl: 1  •  famotidine (PEPCID) 20 MG tablet, Take 20 mg by mouth 2 (Two) Times a Day., Disp: , Rfl:   •  hydrochlorothiazide (MICROZIDE) 12.5 MG capsule, Take 1 capsule by mouth Daily., Disp: 90 capsule, Rfl: 2  •  HYDROcodone-acetaminophen (NORCO) 5-325 MG per tablet, Take 1 tablet by mouth 3 (Three) Times a Day As Needed for Moderate Pain ., Disp: 30 tablet, Rfl: 0  •  lamoTRIgine (LaMICtal) 150 MG tablet, Take 1 tablet by mouth 2 (Two) Times a Day., Disp: 180 tablet, Rfl: 2  •  megestrol (MEGACE) 40 MG/ML suspension, Take  by mouth Daily. 1 teaaspoon, Disp: , Rfl:   •  metoprolol succinate XL (TOPROL-XL) 25 MG 24 hr tablet, TAKE 1 TABLET BY MOUTH ONCE DAILY, Disp: 30 tablet, Rfl: 2  •  vitamin D (ERGOCALCIFEROL) 32370 units capsule capsule, Take 50,000 Units by mouth Every 14 (Fourteen) Days.,  "Disp: , Rfl:     Past Surgical History:   Procedure Laterality Date   • ENDOSCOPY N/A 2017    Procedure: ESOPHAGOGASTRODUODENOSCOPY;  Surgeon: Ishmael Mosley DO;  Location: [facility] ENDOSCOPY;  Service:    • LUNG BIOPSY         ROS  No fevers or chills.  No chest pain or shortness of air.  No GI or  disturbances.    PHYSICAL EXAMINATION:       Ht 170.2 cm (67\")   Wt 83.5 kg (184 lb)   BMI 28.82 kg/m²     Physical Exam    GAIT:     []  Normal  []  Antalgic    Assistive device: []  None  []  Walker     []  Crutches  []  Cane     []  Wheelchair  []  Stretcher    Left Hand Exam     Tenderness   The patient is experiencing no tenderness.         Range of Motion     Wrist   Extension: 15   Flexion: 10     Hand   MP Thumb: 80   MP Index: 70   MP Middle: 70   MP Rin   MP Little: 80     Muscle Strength   :  4/5     Other   Erythema: absent  Scars: absent  Sensation: normal              Xr Wrist 3+ View Left    Result Date: 2018  Narrative: Ordering Provider:  Puneet Magallon MD Ordering Diagnosis/Indication:  Closed fracture of distal end of left radius with routine healing, unspecified fracture morphology, subsequent encounter Procedure:  XR WRIST 3+ VW LEFT Exam Date:  18 RELEVANT PRIOR IMAGES:  XR Wrist 3+ View Left 2018 [id number] Final COMPARISON:  Todays X-rays were compared to previous images dated 2018 and demonstrates no change.     Impression:  fracture distal radius and distal ulna metaphysis with callus formation, alignment is mild dorsal angulation, satisfactory, bone quality is decreased Bone quality: decreased Alignment: dorsal angulation right radius and ulna Fracture: distal radius and ulna metaphyseal fractures Soft tissue: normal.             ASSESSMENT:    Diagnoses and all orders for this visit:    Closed torus fracture of distal end of left ulna with routine healing, subsequent encounter          PLAN    Continue slow progression of activity, increase " functional usage of the left arm.  No restrictions at this point.        Puneet Magallon MD

## 2018-07-12 ENCOUNTER — APPOINTMENT (OUTPATIENT)
Dept: GENERAL RADIOLOGY | Facility: HOSPITAL | Age: 48
End: 2018-07-12

## 2018-07-12 ENCOUNTER — HOSPITAL ENCOUNTER (EMERGENCY)
Facility: HOSPITAL | Age: 48
Discharge: HOME | End: 2018-07-12
Attending: EMERGENCY MEDICINE | Admitting: EMERGENCY MEDICINE

## 2018-07-12 VITALS
DIASTOLIC BLOOD PRESSURE: 87 MMHG | WEIGHT: 189 LBS | SYSTOLIC BLOOD PRESSURE: 197 MMHG | TEMPERATURE: 98.2 F | RESPIRATION RATE: 20 BRPM | BODY MASS INDEX: 29.66 KG/M2 | OXYGEN SATURATION: 97 % | HEART RATE: 108 BPM | HEIGHT: 67 IN

## 2018-07-12 DIAGNOSIS — M25.512 ACUTE PAIN OF LEFT SHOULDER: Primary | ICD-10-CM
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
## 2018-07-12 PROCEDURE — 73030 X-RAY EXAM OF SHOULDER: CPT

## 2018-07-12 PROCEDURE — 99283 EMERGENCY DEPT VISIT LOW MDM: CPT

## 2018-07-12 NOTE — ED PROVIDER NOTES
Subjective   Patient presents to emergency department for mechanical fall and left shoulder pain.  States she fell in the grass outside and started having left shoulder pain.          History provided by:  Patient   used: No    Shoulder Injury   Location:  Left shoulder  Severity:  Moderate  Onset quality:  Sudden  Duration:  2 hours  Timing:  Constant  Progression:  Improving  Chronicity:  New  Context:  Fall in grass  Associated symptoms: no chest pain, no fever, no headaches, no loss of consciousness, no nausea, no rash, no shortness of breath and no vomiting        Review of Systems   Constitutional: Negative for chills and fever.   Respiratory: Negative for shortness of breath.    Cardiovascular: Negative for chest pain.   Gastrointestinal: Negative for nausea and vomiting.   Genitourinary: Negative for dysuria and flank pain.   Musculoskeletal: Negative for back pain and neck pain.   Skin: Negative for rash.   Allergic/Immunologic: Negative for immunocompromised state.   Neurological: Negative for loss of consciousness and headaches.   Hematological: Does not bruise/bleed easily.   Psychiatric/Behavioral: Negative for confusion.       Past Medical History:   Diagnosis Date   • Acid reflux    • Anemia    • Closed fracture of rib     Closed fracture of rib - LEFT 7TH?    • Hodgkin's disease (CMS/HCC)    • Hypertension    • Mental retardation    • OCD (obsessive compulsive disorder)    • Rib pain on left side    • Seizures (CMS/HCC)     last one at age 8       Allergies   Allergen Reactions   • Sulfa Antibiotics Nausea And Vomiting       Past Surgical History:   Procedure Laterality Date   • ENDOSCOPY N/A 8/8/2017    Procedure: ESOPHAGOGASTRODUODENOSCOPY;  Surgeon: Ishmael Mosley DO;  Location: [facility] ENDOSCOPY;  Service:    • LUNG BIOPSY         Family History   Problem Relation Age of Onset   • Anxiety disorder Mother    • Asthma Mother    • Breast cancer Maternal Grandmother    • Lung  "cancer Maternal Grandfather        Social History     Social History   • Marital status: Single     Occupational History   •       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 No distress.   HENT:   Head: Normocephalic and atraumatic.   Eyes: Conjunctivae are normal.   Cardiovascular: Normal rate, regular rhythm and normal heart sounds.    Pulmonary/Chest: Effort normal and breath sounds normal. She has no wheezes.   Abdominal: Soft. Bowel sounds are normal. She exhibits no distension. There is no tenderness.   Musculoskeletal: Normal range of motion. She exhibits tenderness. She exhibits no edema or deformity.        Left shoulder: She exhibits tenderness. She exhibits no swelling, no crepitus and no deformity.   Neurological: She is alert.   Skin: Capillary refill takes less than 2 seconds.   Psychiatric: She has a normal mood and affect. Her behavior is normal. Thought content normal.   Nursing note and vitals reviewed.      Procedures           ED Course      Xr Shoulder 2+ View Left    Result Date: 7/12/2018  Narrative: PROCEDURE:  Left  Shoulder 4 views. REASON FOR EXAM:  fall, left shoulder pain FINDINGS: No comparison. Bony structures of the left shoulder normal. There is no evidence of fracture or dislocation. Soft tissue structures normal.     Impression: Negative shoulder. Electronically signed by:  Mina Marcelo MD  7/12/2018 5:19 PM CDT Workstation: [id number]    Xr Wrist 3+ View Left    Result Date: " 7/11/2018  Narrative: Ordering Provider:  Puneet Magallon MD Ordering Diagnosis/Indication:  Closed fracture of distal end of left radius with routine healing, unspecified fracture morphology, subsequent encounter Procedure:  XR WRIST 3+ VW LEFT Exam Date:  7/11/18 RELEVANT PRIOR IMAGES:  XR Wrist 3+ View Left 05/30/2018 [id number] Final COMPARISON:  Todays X-rays were compared to previous images dated 5/30/2018 and demonstrates no change.     Impression:  fracture distal radius and distal ulna metaphysis with callus formation, alignment is mild dorsal angulation, satisfactory, bone quality is decreased Bone quality: decreased Alignment: dorsal angulation right radius and ulna Fracture: distal radius and ulna metaphyseal fractures Soft tissue: normal.                 MDM      Final diagnoses:   Acute pain of left shoulder   Fall, initial encounter            Nixon Black PA-C  07/12/18 2018

## 2018-07-23 ENCOUNTER — TELEPHONE (OUTPATIENT)
Dept: ONCOLOGY | Facility: CLINIC | Age: 48
End: 2018-07-23

## 2018-07-26 ENCOUNTER — INFUSION (OUTPATIENT)
Dept: ONCOLOGY | Facility: HOSPITAL | Age: 48
End: 2018-07-26

## 2018-07-26 VITALS
HEART RATE: 111 BPM | DIASTOLIC BLOOD PRESSURE: 81 MMHG | SYSTOLIC BLOOD PRESSURE: 144 MMHG | TEMPERATURE: 99 F | RESPIRATION RATE: 20 BRPM

## 2018-07-26 DIAGNOSIS — D50.0 IRON DEFICIENCY ANEMIA DUE TO CHRONIC BLOOD LOSS: Primary | ICD-10-CM

## 2018-07-26 PROCEDURE — A9270 NON-COVERED ITEM OR SERVICE: HCPCS | Performed by: NURSE PRACTITIONER

## 2018-07-26 PROCEDURE — 25010000002 FERUMOXYTOL 510 MG/17ML SOLUTION 510 MG VIAL: Performed by: NURSE PRACTITIONER

## 2018-07-26 PROCEDURE — 63710000001 DIPHENHYDRAMINE PER 50 MG: Performed by: NURSE PRACTITIONER

## 2018-07-26 PROCEDURE — 96375 TX/PRO/DX INJ NEW DRUG ADDON: CPT | Performed by: INTERNAL MEDICINE

## 2018-07-26 PROCEDURE — 96376 TX/PRO/DX INJ SAME DRUG ADON: CPT | Performed by: INTERNAL MEDICINE

## 2018-07-26 RX ORDER — DIPHENHYDRAMINE HCL 25 MG
25 CAPSULE ORAL ONCE
Status: CANCELLED | OUTPATIENT
Start: 2018-07-26

## 2018-07-26 RX ORDER — SODIUM CHLORIDE 9 MG/ML
250 INJECTION, SOLUTION INTRAVENOUS ONCE
Status: COMPLETED | OUTPATIENT
Start: 2018-07-26 | End: 2018-07-26

## 2018-07-26 RX ORDER — FAMOTIDINE 10 MG/ML
20 INJECTION, SOLUTION INTRAVENOUS ONCE
Status: CANCELLED | OUTPATIENT
Start: 2018-07-26

## 2018-07-26 RX ORDER — DIPHENHYDRAMINE HCL 25 MG
25 CAPSULE ORAL ONCE
Status: COMPLETED | OUTPATIENT
Start: 2018-07-26 | End: 2018-07-26

## 2018-07-26 RX ORDER — SODIUM CHLORIDE 9 MG/ML
250 INJECTION, SOLUTION INTRAVENOUS ONCE
Status: CANCELLED | OUTPATIENT
Start: 2018-07-26

## 2018-07-26 RX ORDER — FAMOTIDINE 10 MG/ML
20 INJECTION, SOLUTION INTRAVENOUS ONCE
Status: COMPLETED | OUTPATIENT
Start: 2018-07-26 | End: 2018-07-26

## 2018-07-26 RX ADMIN — SODIUM CHLORIDE 250 ML: 9 INJECTION, SOLUTION INTRAVENOUS at 09:41

## 2018-07-26 RX ADMIN — DIPHENHYDRAMINE HYDROCHLORIDE 25 MG: 25 CAPSULE ORAL at 09:52

## 2018-07-26 RX ADMIN — FAMOTIDINE 20 MG: 10 INJECTION INTRAVENOUS at 09:52

## 2018-07-26 RX ADMIN — FERUMOXYTOL 510 MG: 510 INJECTION INTRAVENOUS at 10:12

## 2018-07-26 NOTE — PATIENT INSTRUCTIONS
Ferumoxytol injection  What is this medicine?  FERUMOXYTOL is an iron complex. Iron is used to make healthy red blood cells, which carry oxygen and nutrients throughout the body. This medicine is used to treat iron deficiency anemia in people with chronic kidney disease.  This medicine may be used for other purposes; ask your health care provider or pharmacist if you have questions.  COMMON BRAND NAME(S): Feraheme  What should I tell my health care provider before I take this medicine?  They need to know if you have any of these conditions:  -anemia not caused by low iron levels  -high levels of iron in the blood  -magnetic resonance imaging (MRI) test scheduled  -an unusual or allergic reaction to iron, other medicines, foods, dyes, or preservatives  -pregnant or trying to get pregnant  -breast-feeding  How should I use this medicine?  This medicine is for injection into a vein. It is given by a health care professional in a hospital or clinic setting.  Talk to your pediatrician regarding the use of this medicine in children. Special care may be needed.  Overdosage: If you think you have taken too much of this medicine contact a poison control center or emergency room at once.  NOTE: This medicine is only for you. Do not share this medicine with others.  What if I miss a dose?  It is important not to miss your dose. Call your doctor or health care professional if you are unable to keep an appointment.  What may interact with this medicine?  This medicine may interact with the following medications:  -other iron products  This list may not describe all possible interactions. Give your health care provider a list of all the medicines, herbs, non-prescription drugs, or dietary supplements you use. Also tell them if you smoke, drink alcohol, or use illegal drugs. Some items may interact with your medicine.  What should I watch for while using this medicine?  Visit your doctor or healthcare professional regularly. Tell  your doctor or healthcare professional if your symptoms do not start to get better or if they get worse. You may need blood work done while you are taking this medicine.  You may need to follow a special diet. Talk to your doctor. Foods that contain iron include: whole grains/cereals, dried fruits, beans, or peas, leafy green vegetables, and organ meats (liver, kidney).  What side effects may I notice from receiving this medicine?  Side effects that you should report to your doctor or health care professional as soon as possible:  -allergic reactions like skin rash, itching or hives, swelling of the face, lips, or tongue  -breathing problems  -changes in blood pressure  -feeling faint or lightheaded, falls  -fever or chills  -flushing, sweating, or hot feelings  -swelling of the ankles or feet  Side effects that usually do not require medical attention (report to your doctor or health care professional if they continue or are bothersome):  -diarrhea  -headache  -nausea, vomiting  -stomach pain  This list may not describe all possible side effects. Call your doctor for medical advice about side effects. You may report side effects to FDA at 0-802-FDA-0609.  Where should I keep my medicine?  This drug is given in a hospital or clinic and will not be stored at home.  NOTE: This sheet is a summary. It may not cover all possible information. If you have questions about this medicine, talk to your doctor, pharmacist, or health care provider.  © 2018 Elsevier/Gold Standard (2017-01-19 12:41:49)    Fall Prevention in the Home  Falls can cause injuries. They can happen to people of all ages. There are many things you can do to make your home safe and to help prevent falls.  What can I do on the outside of my home?  · Regularly fix the edges of walkways and driveways and fix any cracks.  · Remove anything that might make you trip as you walk through a door, such as a raised step or threshold.  · Trim any bushes or trees on  the path to your home.  · Use bright outdoor lighting.  · Clear any walking paths of anything that might make someone trip, such as rocks or tools.  · Regularly check to see if handrails are loose or broken. Make sure that both sides of any steps have handrails.  · Any raised decks and porches should have guardrails on the edges.  · Have any leaves, snow, or ice cleared regularly.  · Use sand or salt on walking paths during winter.  · Clean up any spills in your garage right away. This includes oil or grease spills.  What can I do in the bathroom?  · Use night lights.  · Install grab bars by the toilet and in the tub and shower. Do not use towel bars as grab bars.  · Use non-skid mats or decals in the tub or shower.  · If you need to sit down in the shower, use a plastic, non-slip stool.  · Keep the floor dry. Clean up any water that spills on the floor as soon as it happens.  · Remove soap buildup in the tub or shower regularly.  · Attach bath mats securely with double-sided non-slip rug tape.  · Do not have throw rugs and other things on the floor that can make you trip.  What can I do in the bedroom?  · Use night lights.  · Make sure that you have a light by your bed that is easy to reach.  · Do not use any sheets or blankets that are too big for your bed. They should not hang down onto the floor.  · Have a firm chair that has side arms. You can use this for support while you get dressed.  · Do not have throw rugs and other things on the floor that can make you trip.  What can I do in the kitchen?  · Clean up any spills right away.  · Avoid walking on wet floors.  · Keep items that you use a lot in easy-to-reach places.  · If you need to reach something above you, use a strong step stool that has a grab bar.  · Keep electrical cords out of the way.  · Do not use floor polish or wax that makes floors slippery. If you must use wax, use non-skid floor wax.  · Do not have throw rugs and other things on the floor that  can make you trip.  What can I do with my stairs?  · Do not leave any items on the stairs.  · Make sure that there are handrails on both sides of the stairs and use them. Fix handrails that are broken or loose. Make sure that handrails are as long as the stairways.  · Check any carpeting to make sure that it is firmly attached to the stairs. Fix any carpet that is loose or worn.  · Avoid having throw rugs at the top or bottom of the stairs. If you do have throw rugs, attach them to the floor with carpet tape.  · Make sure that you have a light switch at the top of the stairs and the bottom of the stairs. If you do not have them, ask someone to add them for you.  What else can I do to help prevent falls?  · Wear shoes that:  ? Do not have high heels.  ? Have rubber bottoms.  ? Are comfortable and fit you well.  ? Are closed at the toe. Do not wear sandals.  · If you use a stepladder:  ? Make sure that it is fully opened. Do not climb a closed stepladder.  ? Make sure that both sides of the stepladder are locked into place.  ? Ask someone to hold it for you, if possible.  · Clearly polly and make sure that you can see:  ? Any grab bars or handrails.  ? First and last steps.  ? Where the edge of each step is.  · Use tools that help you move around (mobility aids) if they are needed. These include:  ? Canes.  ? Walkers.  ? Scooters.  ? Crutches.  · Turn on the lights when you go into a dark area. Replace any light bulbs as soon as they burn out.  · Set up your furniture so you have a clear path. Avoid moving your furniture around.  · If any of your floors are uneven, fix them.  · If there are any pets around you, be aware of where they are.  · Review your medicines with your doctor. Some medicines can make you feel dizzy. This can increase your chance of falling.  Ask your doctor what other things that you can do to help prevent falls.  This information is not intended to replace advice given to you by your health care  provider. Make sure you discuss any questions you have with your health care provider.  Document Released: 10/14/2010 Document Revised: 05/25/2017 Document Reviewed: 01/22/2016  Elsevier Interactive Patient Education © 2018 Elsevier Inc.

## 2018-08-07 ENCOUNTER — OFFICE VISIT (OUTPATIENT)
Dept: FAMILY MEDICINE CLINIC | Facility: CLINIC | Age: 48
End: 2018-08-07

## 2018-08-07 ENCOUNTER — LAB (OUTPATIENT)
Dept: LAB | Facility: HOSPITAL | Age: 48
End: 2018-08-07

## 2018-08-07 VITALS
SYSTOLIC BLOOD PRESSURE: 134 MMHG | HEIGHT: 67 IN | OXYGEN SATURATION: 99 % | HEART RATE: 107 BPM | WEIGHT: 189.31 LBS | DIASTOLIC BLOOD PRESSURE: 80 MMHG | BODY MASS INDEX: 29.71 KG/M2

## 2018-08-07 DIAGNOSIS — R62.50 DEVELOPMENTAL DELAY, PROFOUND: ICD-10-CM

## 2018-08-07 DIAGNOSIS — G40.909 NONINTRACTABLE EPILEPSY WITHOUT STATUS EPILEPTICUS, UNSPECIFIED EPILEPSY TYPE (HCC): ICD-10-CM

## 2018-08-07 DIAGNOSIS — I10 ESSENTIAL HYPERTENSION: Primary | ICD-10-CM

## 2018-08-07 DIAGNOSIS — K21.9 GASTROESOPHAGEAL REFLUX DISEASE WITHOUT ESOPHAGITIS: ICD-10-CM

## 2018-08-07 DIAGNOSIS — I10 ESSENTIAL HYPERTENSION: ICD-10-CM

## 2018-08-07 LAB
ALBUMIN SERPL-MCNC: 4.6 G/DL (ref 3.4–4.8)
ALBUMIN/GLOB SERPL: 1.3 G/DL (ref 1.1–1.8)
ALP SERPL-CCNC: 100 U/L (ref 38–126)
ALT SERPL W P-5'-P-CCNC: 20 U/L (ref 9–52)
ANION GAP SERPL CALCULATED.3IONS-SCNC: 10 MMOL/L (ref 5–15)
AST SERPL-CCNC: 25 U/L (ref 14–36)
BASOPHILS # BLD AUTO: 0.05 10*3/MM3 (ref 0–0.2)
BASOPHILS NFR BLD AUTO: 0.7 % (ref 0–2)
BILIRUB SERPL-MCNC: 0.5 MG/DL (ref 0.2–1.3)
BUN BLD-MCNC: 16 MG/DL (ref 7–21)
BUN/CREAT SERPL: 14.2 (ref 7–25)
CALCIUM SPEC-SCNC: 9.5 MG/DL (ref 8.4–10.2)
CHLORIDE SERPL-SCNC: 101 MMOL/L (ref 95–110)
CO2 SERPL-SCNC: 28 MMOL/L (ref 22–31)
CREAT BLD-MCNC: 1.13 MG/DL (ref 0.5–1)
DEPRECATED RDW RBC AUTO: 45.4 FL (ref 36.4–46.3)
EOSINOPHIL # BLD AUTO: 0.1 10*3/MM3 (ref 0–0.7)
EOSINOPHIL NFR BLD AUTO: 1.3 % (ref 0–7)
ERYTHROCYTE [DISTWIDTH] IN BLOOD BY AUTOMATED COUNT: 13.7 % (ref 11.5–14.5)
GFR SERPL CREATININE-BSD FRML MDRD: 52 ML/MIN/1.73 (ref 58–135)
GLOBULIN UR ELPH-MCNC: 3.5 GM/DL (ref 2.3–3.5)
GLUCOSE BLD-MCNC: 90 MG/DL (ref 60–100)
HCT VFR BLD AUTO: 42.4 % (ref 35–45)
HGB BLD-MCNC: 14.6 G/DL (ref 12–15.5)
IMM GRANULOCYTES # BLD: 0.14 10*3/MM3 (ref 0–0.02)
IMM GRANULOCYTES NFR BLD: 1.9 % (ref 0–0.5)
LYMPHOCYTES # BLD AUTO: 1.43 10*3/MM3 (ref 0.6–4.2)
LYMPHOCYTES NFR BLD AUTO: 19 % (ref 10–50)
MCH RBC QN AUTO: 31.5 PG (ref 26.5–34)
MCHC RBC AUTO-ENTMCNC: 34.4 G/DL (ref 31.4–36)
MCV RBC AUTO: 91.4 FL (ref 80–98)
MONOCYTES # BLD AUTO: 0.66 10*3/MM3 (ref 0–0.9)
MONOCYTES NFR BLD AUTO: 8.8 % (ref 0–12)
NEUTROPHILS # BLD AUTO: 5.13 10*3/MM3 (ref 2–8.6)
NEUTROPHILS NFR BLD AUTO: 68.3 % (ref 37–80)
PLATELET # BLD AUTO: 300 10*3/MM3 (ref 150–450)
PMV BLD AUTO: 8.3 FL (ref 8–12)
POTASSIUM BLD-SCNC: 4.3 MMOL/L (ref 3.5–5.1)
PROT SERPL-MCNC: 8.1 G/DL (ref 6.3–8.6)
RBC # BLD AUTO: 4.64 10*6/MM3 (ref 3.77–5.16)
SODIUM BLD-SCNC: 139 MMOL/L (ref 137–145)
WBC NRBC COR # BLD: 7.51 10*3/MM3 (ref 3.2–9.8)

## 2018-08-07 PROCEDURE — 36415 COLL VENOUS BLD VENIPUNCTURE: CPT

## 2018-08-07 PROCEDURE — 99214 OFFICE O/P EST MOD 30 MIN: CPT | Performed by: FAMILY MEDICINE

## 2018-08-07 PROCEDURE — 80053 COMPREHEN METABOLIC PANEL: CPT

## 2018-08-07 PROCEDURE — 85025 COMPLETE CBC W/AUTO DIFF WBC: CPT

## 2018-08-07 RX ORDER — CITALOPRAM 40 MG/1
40 TABLET ORAL DAILY
Qty: 90 TABLET | Refills: 2 | Status: SHIPPED | OUTPATIENT
Start: 2018-08-07 | End: 2019-05-06 | Stop reason: SDUPTHER

## 2018-08-07 RX ORDER — METOPROLOL SUCCINATE 25 MG/1
25 TABLET, EXTENDED RELEASE ORAL DAILY
Qty: 30 TABLET | Refills: 2 | Status: SHIPPED | OUTPATIENT
Start: 2018-08-07 | End: 2018-10-30 | Stop reason: SDUPTHER

## 2018-08-07 RX ORDER — MEGESTROL ACETATE 40 MG/ML
40 SUSPENSION ORAL DAILY
Qty: 240 ML | Refills: 3 | Status: SHIPPED | OUTPATIENT
Start: 2018-08-07 | End: 2018-10-30 | Stop reason: SDUPTHER

## 2018-08-07 RX ORDER — HYDROCHLOROTHIAZIDE 12.5 MG/1
12.5 CAPSULE, GELATIN COATED ORAL DAILY
Qty: 90 CAPSULE | Refills: 2 | Status: SHIPPED | OUTPATIENT
Start: 2018-08-07 | End: 2019-05-20 | Stop reason: SDUPTHER

## 2018-08-07 RX ORDER — DIAZEPAM 5 MG/1
5 TABLET ORAL NIGHTLY
Qty: 30 TABLET | Refills: 2 | Status: SHIPPED | OUTPATIENT
Start: 2018-08-07 | End: 2018-10-06 | Stop reason: SDUPTHER

## 2018-08-07 RX ORDER — FAMOTIDINE 20 MG/1
20 TABLET, FILM COATED ORAL 2 TIMES DAILY PRN
Qty: 60 TABLET | Refills: 3 | Status: SHIPPED | OUTPATIENT
Start: 2018-08-07 | End: 2018-12-02 | Stop reason: SDUPTHER

## 2018-08-07 RX ORDER — LAMOTRIGINE 150 MG/1
150 TABLET ORAL 2 TIMES DAILY
Qty: 180 TABLET | Refills: 2 | Status: SHIPPED | OUTPATIENT
Start: 2018-08-07 | End: 2019-05-20 | Stop reason: SDUPTHER

## 2018-08-07 NOTE — PROGRESS NOTES
Subjective   Teodora Whitamn is a 48 y.o. female.     History of Present Illness The patient comes in for check of their chronic medical issues which include Hypertension,Epilepsy Developmental Delay. She is at her baseline. .She has no complaints.      The following portions of the patient's history were reviewed and updated as appropriate: allergies, current medications, past family history, past medical history, past social history, past surgical history and problem list.    Review of Systems   Constitutional: Negative for fatigue and fever.   Respiratory: Negative for cough, chest tightness and stridor.    Cardiovascular: Negative for chest pain, palpitations and leg swelling.       Objective   Physical Exam   Constitutional: She appears well-developed and well-nourished.   HENT:   Head: Normocephalic and atraumatic.   Right Ear: External ear normal.   Left Ear: External ear normal.   Nose: Nose normal.   Mouth/Throat: Oropharynx is clear and moist.   Eyes: Pupils are equal, round, and reactive to light.   Cardiovascular: Normal rate, regular rhythm and normal heart sounds.  Exam reveals no gallop and no friction rub.    No murmur heard.  Pulmonary/Chest: Effort normal and breath sounds normal. No respiratory distress. She has no wheezes. She has no rales.   Abdominal: Soft. Bowel sounds are normal. She exhibits no distension. There is no tenderness.   Skin: Skin is warm and dry.   Vitals reviewed.        Assessment/Plan   Teodora was seen today for establish care and ocd.    Diagnoses and all orders for this visit:    Essential hypertension  -     Comprehensive metabolic panel; Future  -     CBC w AUTO Differential; Future    Gastroesophageal reflux disease without esophagitis    Developmental delay, profound    Nonintractable epilepsy without status epilepticus, unspecified epilepsy type (CMS/[facility])    Other orders  -     megestrol (MEGACE) 40 MG/ML suspension; Take 1 mL by mouth Daily. 1 teaaspoon  -      metoprolol succinate XL (TOPROL-XL) 25 MG 24 hr tablet; Take 1 tablet by mouth Daily.  -     lamoTRIgine (LaMICtal) 150 MG tablet; Take 1 tablet by mouth 2 (Two) Times a Day.  -     hydrochlorothiazide (MICROZIDE) 12.5 MG capsule; Take 1 capsule by mouth Daily.  -     famotidine (PEPCID) 20 MG tablet; Take 1 tablet by mouth 2 (Two) Times a Day As Needed for Heartburn.  -     citalopram (CeleXA) 40 MG tablet; Take 1 tablet by mouth Daily.  -     diazePAM (VALIUM) 5 MG tablet; Take 1 tablet by mouth Every Night.      Return to the clinic in 3 months.  Will contact with results as needed.

## 2018-08-28 ENCOUNTER — ANESTHESIA EVENT (OUTPATIENT)
Dept: GASTROENTEROLOGY | Facility: HOSPITAL | Age: 48
End: 2018-08-28

## 2018-08-28 ENCOUNTER — ANESTHESIA (OUTPATIENT)
Dept: GASTROENTEROLOGY | Facility: HOSPITAL | Age: 48
End: 2018-08-28

## 2018-08-28 ENCOUNTER — HOSPITAL ENCOUNTER (OUTPATIENT)
Facility: HOSPITAL | Age: 48
Setting detail: HOSPITAL OUTPATIENT SURGERY
Discharge: HOME | End: 2018-08-28
Attending: INTERNAL MEDICINE | Admitting: INTERNAL MEDICINE

## 2018-08-28 VITALS
BODY MASS INDEX: 29.65 KG/M2 | DIASTOLIC BLOOD PRESSURE: 72 MMHG | WEIGHT: 188.93 LBS | HEART RATE: 94 BPM | TEMPERATURE: 97.4 F | RESPIRATION RATE: 18 BRPM | HEIGHT: 67 IN | SYSTOLIC BLOOD PRESSURE: 120 MMHG | OXYGEN SATURATION: 97 %

## 2018-08-28 DIAGNOSIS — K22.70 BARRETT ESOPHAGUS: ICD-10-CM

## 2018-08-28 PROCEDURE — 25010000002 PROPOFOL 10 MG/ML EMULSION: Performed by: NURSE ANESTHETIST, CERTIFIED REGISTERED

## 2018-08-28 PROCEDURE — 88305 TISSUE EXAM BY PATHOLOGIST: CPT | Performed by: INTERNAL MEDICINE

## 2018-08-28 PROCEDURE — 88305 TISSUE EXAM BY PATHOLOGIST: CPT | Performed by: PATHOLOGY

## 2018-08-28 RX ORDER — PROPOFOL 10 MG/ML
VIAL (ML) INTRAVENOUS AS NEEDED
Status: DISCONTINUED | OUTPATIENT
Start: 2018-08-28 | End: 2018-08-28 | Stop reason: SURG

## 2018-08-28 RX ORDER — DEXTROSE AND SODIUM CHLORIDE 5; .45 G/100ML; G/100ML
30 INJECTION, SOLUTION INTRAVENOUS CONTINUOUS
Status: DISCONTINUED | OUTPATIENT
Start: 2018-08-28 | End: 2018-08-28 | Stop reason: HOSPADM

## 2018-08-28 RX ORDER — LIDOCAINE HYDROCHLORIDE 10 MG/ML
INJECTION, SOLUTION INFILTRATION; PERINEURAL AS NEEDED
Status: DISCONTINUED | OUTPATIENT
Start: 2018-08-28 | End: 2018-08-28 | Stop reason: SURG

## 2018-08-28 RX ADMIN — LIDOCAINE HYDROCHLORIDE 50 MG: 10 INJECTION, SOLUTION INFILTRATION; PERINEURAL at 10:25

## 2018-08-28 RX ADMIN — PROPOFOL 100 MG: 10 INJECTION, EMULSION INTRAVENOUS at 10:31

## 2018-08-28 RX ADMIN — PROPOFOL 100 MG: 10 INJECTION, EMULSION INTRAVENOUS at 10:26

## 2018-08-28 RX ADMIN — DEXTROSE AND SODIUM CHLORIDE 30 ML/HR: 5; 450 INJECTION, SOLUTION INTRAVENOUS at 09:18

## 2018-08-28 NOTE — ANESTHESIA PREPROCEDURE EVALUATION
Anesthesia Evaluation     Patient summary reviewed and Nursing notes reviewed   NPO Solid Status: > 8 hours  NPO Liquid Status: > 8 hours           Airway   Mallampati: III  TM distance: <3 FB  Neck ROM: full  Possible difficult intubation  Dental - normal exam     Pulmonary - normal exam   Cardiovascular - normal exam    (+) hypertension,       Neuro/Psych  (+) seizures, psychiatric history,     GI/Hepatic/Renal/Endo      Musculoskeletal     Abdominal    Substance History      OB/GYN          Other      history of cancer                    Anesthesia Plan    ASA 3     MAC     intravenous induction   Anesthetic plan and risks discussed with patient.    Plan discussed with CRNA.

## 2018-08-28 NOTE — ANESTHESIA POSTPROCEDURE EVALUATION
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])    Surgeon:  Ishmael Mosley DO Provider:  Kulwant Hebert CRNA    Anesthesia Type:  MAC ASA Status:  3          Anesthesia Type: MAC  Last vitals  BP   150/84 (08/28/18 0910)   Temp   97.1 °F (36.2 °C) (08/28/18 0910)   Pulse   99 (08/28/18 0910)   Resp   16 (08/28/18 0910)     SpO2   99 % (08/28/18 0910)     Post Anesthesia Care and Evaluation    Patient location during evaluation: PACU  Patient participation: complete - patient participated  Level of consciousness: awake and alert  Pain score: 1  Pain management: adequate  Airway patency: patent  Anesthetic complications: No anesthetic complications  PONV Status: none  Cardiovascular status: acceptable  Respiratory status: acceptable  Hydration status: acceptable

## 2018-08-29 LAB
LAB AP CASE REPORT: NORMAL
PATH REPORT.FINAL DX SPEC: NORMAL
PATH REPORT.GROSS SPEC: NORMAL

## 2018-09-10 ENCOUNTER — OFFICE VISIT (OUTPATIENT)
Dept: ORTHOPEDIC SURGERY | Facility: CLINIC | Age: 48
End: 2018-09-10

## 2018-09-10 VITALS — BODY MASS INDEX: 29.51 KG/M2 | WEIGHT: 188 LBS | HEIGHT: 67 IN

## 2018-09-10 DIAGNOSIS — S52.602D CLOSED FRACTURE OF DISTAL END OF LEFT ULNA WITH ROUTINE HEALING, UNSPECIFIED FRACTURE MORPHOLOGY, SUBSEQUENT ENCOUNTER: Primary | ICD-10-CM

## 2018-09-10 PROCEDURE — 99212 OFFICE O/P EST SF 10 MIN: CPT | Performed by: ORTHOPAEDIC SURGERY

## 2018-09-10 NOTE — PROGRESS NOTES
"Teodora Whitman is a 48 y.o. female returns for     Chief Complaint   Patient presents with   • Left Wrist - Follow-up       HISTORY OF PRESENT ILLNESS: 2 month recheck left wrist   Pain scale today 0/10  No pain , doing well, no complaints, no restrictions with use of left wrist.     CONCURRENT MEDICAL HISTORY:    The following portions of the patient's history were reviewed and updated as appropriate: allergies, current medications, past family history, past medical history, past social history, past surgical history and problem list.     ROS  No fevers or chills.  No chest pain or shortness of air.  No GI or  disturbances.    PHYSICAL EXAMINATION:       Ht 170.2 cm (67\")   Wt 85.3 kg (188 lb)   BMI 29.44 kg/m²     Physical Exam    GAIT:     []  Normal  []  Antalgic    Assistive device: [x]  None  []  Walker     []  Crutches  []  Cane     []  Wheelchair  []  Stretcher    Left Hand Exam     Tenderness   The patient is experiencing tenderness in the dorsal area and radial area.     Range of Motion     Wrist   Extension: 10   Flexion: 20     Muscle Strength   Wrist Extension: 4/5   Wrist Flexion: 4/5     Comments:  Alignment is acceptable, no tenderness              No results found.          ASSESSMENT:    Diagnoses and all orders for this visit:    Closed fracture of distal end of left ulna with routine healing, unspecified fracture morphology, subsequent encounter          PLAN    She may use the wrist/ hand as desired, no restriction,   No follow up necessary.      Puneet Magallon MD  "

## 2018-10-04 RX ORDER — DIAZEPAM 5 MG/1
TABLET ORAL
Qty: 30 TABLET | Refills: 1 | OUTPATIENT
Start: 2018-10-04

## 2018-10-04 NOTE — TELEPHONE ENCOUNTER
Last OV 9/10/18    Next OV 10/30/18    Last Script Written *8/7/18 #30 with 2 refills (Aug, Sept & Oct)     *She should not need a refill until her next visit on 10/30/18    Last ALE  5/3/18    I have called [facility] in [place], They have the script from 8/7/18 #30 but only 1 Refill is circled, not 2 as indicated in the Med List    Please Advise    Thank you

## 2018-10-05 ENCOUNTER — TELEPHONE (OUTPATIENT)
Dept: FAMILY MEDICINE CLINIC | Facility: CLINIC | Age: 48
End: 2018-10-05

## 2018-10-08 ENCOUNTER — TELEPHONE (OUTPATIENT)
Dept: FAMILY MEDICINE CLINIC | Facility: CLINIC | Age: 48
End: 2018-10-08

## 2018-10-08 RX ORDER — DIAZEPAM 5 MG/1
TABLET ORAL
Qty: 10 TABLET | Refills: 0 | OUTPATIENT
Start: 2018-10-08 | End: 2018-10-15 | Stop reason: SDUPTHER

## 2018-10-08 NOTE — TELEPHONE ENCOUNTER
Per Dr. Andrade, covering Provider for Dr. Hamilton    I have called a 10 day supply for Ms. Kinsey's Valium 5 mg to [facility] in [place].  I have called the mother and apologized for the inconvenience it has caused.

## 2018-10-08 NOTE — TELEPHONE ENCOUNTER
Dr. Andrade,     I have called [facility], They have not received any response from Dr. Hamilton's office.  Can she have 8 to last until he gets back on Monday??      Please Advise    Info sent to Dr. Hamilton on Thursday  Last OV           9/10/18     Next OV          10/30/18     Last Script Written      *8/7/18 #30 with 2 refills (Aug, Sept & Oct)                                      *She should not need a refill until her next visit on 10/30/18     Last ALE              5/3/18     I have called [facility] in [place], They have the script from 8/7/18 #30 but only 1 Refill is circled, not 2 as indicated in the Med List     Please Advise     Thank you

## 2018-10-15 RX ORDER — DIAZEPAM 5 MG/1
5 TABLET ORAL
Qty: 30 TABLET | Refills: 1 | Status: SHIPPED | OUTPATIENT
Start: 2018-10-15 | End: 2018-12-16 | Stop reason: SDUPTHER

## 2018-10-30 ENCOUNTER — OFFICE VISIT (OUTPATIENT)
Dept: FAMILY MEDICINE CLINIC | Facility: CLINIC | Age: 48
End: 2018-10-30

## 2018-10-30 VITALS
BODY MASS INDEX: 31.3 KG/M2 | HEIGHT: 67 IN | SYSTOLIC BLOOD PRESSURE: 148 MMHG | HEART RATE: 113 BPM | OXYGEN SATURATION: 95 % | WEIGHT: 199.44 LBS | DIASTOLIC BLOOD PRESSURE: 80 MMHG

## 2018-10-30 DIAGNOSIS — Z23 NEED FOR IMMUNIZATION AGAINST INFLUENZA: Primary | ICD-10-CM

## 2018-10-30 DIAGNOSIS — I10 ESSENTIAL HYPERTENSION: ICD-10-CM

## 2018-10-30 DIAGNOSIS — K21.9 GASTROESOPHAGEAL REFLUX DISEASE WITHOUT ESOPHAGITIS: ICD-10-CM

## 2018-10-30 DIAGNOSIS — D64.9 ANEMIA, UNSPECIFIED TYPE: ICD-10-CM

## 2018-10-30 PROCEDURE — G0008 ADMIN INFLUENZA VIRUS VAC: HCPCS | Performed by: FAMILY MEDICINE

## 2018-10-30 PROCEDURE — 90674 CCIIV4 VAC NO PRSV 0.5 ML IM: CPT | Performed by: FAMILY MEDICINE

## 2018-10-30 PROCEDURE — 99214 OFFICE O/P EST MOD 30 MIN: CPT | Performed by: FAMILY MEDICINE

## 2018-10-30 RX ORDER — MEGESTROL ACETATE 40 MG/ML
40 SUSPENSION ORAL DAILY
Qty: 240 ML | Refills: 3 | Status: SHIPPED | OUTPATIENT
Start: 2018-10-30 | End: 2019-05-20 | Stop reason: SDUPTHER

## 2018-10-30 RX ORDER — METOPROLOL SUCCINATE 25 MG/1
25 TABLET, EXTENDED RELEASE ORAL DAILY
Qty: 30 TABLET | Refills: 2 | Status: SHIPPED | OUTPATIENT
Start: 2018-10-30 | End: 2019-02-19 | Stop reason: SDUPTHER

## 2018-10-30 NOTE — PROGRESS NOTES
Subjective   Teodora Whitman is a 48 y.o. female.   Cc:follow up  History of Present Illness The patient comes in for check of her chronic medical issues which include Epilepsy,GERD and Hypertension.. She is doing well per her Mother. She terra no c/o.    The following portions of the patient's history were reviewed and updated as appropriate: allergies, current medications, past family history, past medical history, past social history, past surgical history and problem list.    Review of Systems   Constitutional: Negative for fatigue and fever.   Respiratory: Negative for cough, chest tightness and stridor.    Cardiovascular: Negative for chest pain, palpitations and leg swelling.       Objective   Physical Exam   Constitutional: She appears well-developed and well-nourished.   HENT:   Head: Normocephalic and atraumatic.   Right Ear: External ear normal.   Left Ear: External ear normal.   Nose: Nose normal.   Mouth/Throat: Oropharynx is clear and moist.   Eyes: Pupils are equal, round, and reactive to light.   Neck: Normal range of motion.   Cardiovascular: Normal rate, regular rhythm and normal heart sounds.  Exam reveals no gallop and no friction rub.    No murmur heard.  Pulmonary/Chest: Effort normal and breath sounds normal. No respiratory distress. She has no wheezes. She has no rales.   Abdominal: Soft. Bowel sounds are normal. She exhibits no distension. There is no tenderness. There is no guarding.   Skin: Skin is warm and dry.   Vitals reviewed.        Assessment/Plan   Teodora was seen today for follow-up.    Diagnoses and all orders for this visit:    Need for immunization against influenza  -     Flucelvax Quad=>4Years ([id number])    Essential hypertension  -     Comprehensive metabolic panel; Future  -     CBC w AUTO Differential; Future    Gastroesophageal reflux disease without esophagitis    Anemia, unspecified type    Other orders  -     megestrol (MEGACE) 40 MG/ML suspension; Take 1 mL by mouth  Daily. 1 teaaspoon  -     metoprolol succinate XL (TOPROL-XL) 25 MG 24 hr tablet; Take 1 tablet by mouth Daily.      Return to the clinic in 3 month/s.  Will contact with results as needed.

## 2018-11-19 ENCOUNTER — TRANSCRIBE ORDERS (OUTPATIENT)
Dept: LAB | Facility: HOSPITAL | Age: 48
End: 2018-11-19

## 2018-11-19 ENCOUNTER — LAB (OUTPATIENT)
Dept: LAB | Facility: HOSPITAL | Age: 48
End: 2018-11-19

## 2018-11-19 DIAGNOSIS — I10 ESSENTIAL HYPERTENSION: ICD-10-CM

## 2018-11-19 DIAGNOSIS — F79 INTELLECTUAL FUNCTIONING DISABILITY: ICD-10-CM

## 2018-11-19 DIAGNOSIS — C81.90 HODGKIN LYMPHOMA, UNSPECIFIED HODGKIN LYMPHOMA TYPE, UNSPECIFIED BODY REGION (HCC): Primary | ICD-10-CM

## 2018-11-19 DIAGNOSIS — D50.9 IRON DEFICIENCY ANEMIA, UNSPECIFIED IRON DEFICIENCY ANEMIA TYPE: ICD-10-CM

## 2018-11-19 DIAGNOSIS — K21.9 GASTROESOPHAGEAL REFLUX DISEASE, ESOPHAGITIS PRESENCE NOT SPECIFIED: ICD-10-CM

## 2018-11-19 DIAGNOSIS — N18.30 CHRONIC KIDNEY DISEASE, STAGE III (MODERATE) (HCC): ICD-10-CM

## 2018-11-19 LAB
25(OH)D3 SERPL-MCNC: 27.2 NG/ML (ref 30–100)
ALBUMIN SERPL-MCNC: 4.6 G/DL (ref 3.4–4.8)
ALBUMIN/GLOB SERPL: 1.6 G/DL (ref 1.1–1.8)
ALP SERPL-CCNC: 97 U/L (ref 38–126)
ALT SERPL W P-5'-P-CCNC: 16 U/L (ref 9–52)
ANION GAP SERPL CALCULATED.3IONS-SCNC: 12 MMOL/L (ref 5–15)
AST SERPL-CCNC: 26 U/L (ref 14–36)
BILIRUB SERPL-MCNC: 0.4 MG/DL (ref 0.2–1.3)
BUN BLD-MCNC: 18 MG/DL (ref 7–21)
BUN/CREAT SERPL: 12.8 (ref 7–25)
CALCIUM SPEC-SCNC: 9.5 MG/DL (ref 8.4–10.2)
CHLORIDE SERPL-SCNC: 96 MMOL/L (ref 95–110)
CO2 SERPL-SCNC: 28 MMOL/L (ref 22–31)
CREAT BLD-MCNC: 1.41 MG/DL (ref 0.5–1)
DEPRECATED RDW RBC AUTO: 43 FL (ref 36.4–46.3)
EOSINOPHIL # BLD MANUAL: 0.53 10*3/MM3 (ref 0–0.7)
EOSINOPHIL NFR BLD MANUAL: 6 % (ref 0–7)
ERYTHROCYTE [DISTWIDTH] IN BLOOD BY AUTOMATED COUNT: 13 % (ref 11.5–14.5)
FERRITIN SERPL-MCNC: 64.3 NG/ML (ref 6.2–137)
GFR SERPL CREATININE-BSD FRML MDRD: 40 ML/MIN/1.73 (ref 58–135)
GLOBULIN UR ELPH-MCNC: 2.8 GM/DL (ref 2.3–3.5)
GLUCOSE BLD-MCNC: 99 MG/DL (ref 60–100)
HCT VFR BLD AUTO: 40.9 % (ref 35–45)
HGB BLD-MCNC: 13.8 G/DL (ref 12–15.5)
LYMPHOCYTES # BLD MANUAL: 1.95 10*3/MM3 (ref 0.6–4.2)
LYMPHOCYTES NFR BLD MANUAL: 22 % (ref 10–50)
LYMPHOCYTES NFR BLD MANUAL: 5 % (ref 0–12)
MCH RBC QN AUTO: 30.7 PG (ref 26.5–34)
MCHC RBC AUTO-ENTMCNC: 33.7 G/DL (ref 31.4–36)
MCV RBC AUTO: 90.9 FL (ref 80–98)
METAMYELOCYTES NFR BLD MANUAL: 1 % (ref 0–0)
MONOCYTES # BLD AUTO: 0.44 10*3/MM3 (ref 0–0.9)
NEUTROPHILS # BLD AUTO: 5.85 10*3/MM3 (ref 2–8.6)
NEUTROPHILS NFR BLD MANUAL: 64 % (ref 37–80)
NEUTS BAND NFR BLD MANUAL: 2 % (ref 0–5)
PHOSPHATE SERPL-MCNC: 5.4 MG/DL (ref 2.4–4.4)
PLAT MORPH BLD: NORMAL
PLATELET # BLD AUTO: 302 10*3/MM3 (ref 150–450)
PMV BLD AUTO: 9 FL (ref 8–12)
POTASSIUM BLD-SCNC: 3.7 MMOL/L (ref 3.5–5.1)
PROT SERPL-MCNC: 7.4 G/DL (ref 6.3–8.6)
RBC # BLD AUTO: 4.5 10*6/MM3 (ref 3.77–5.16)
RBC MORPH BLD: NORMAL
SODIUM BLD-SCNC: 136 MMOL/L (ref 137–145)
WBC MORPH BLD: NORMAL
WBC NRBC COR # BLD: 8.86 10*3/MM3 (ref 3.2–9.8)

## 2018-11-19 PROCEDURE — 82728 ASSAY OF FERRITIN: CPT | Performed by: INTERNAL MEDICINE

## 2018-11-19 PROCEDURE — 85007 BL SMEAR W/DIFF WBC COUNT: CPT

## 2018-11-19 PROCEDURE — 36415 COLL VENOUS BLD VENIPUNCTURE: CPT | Performed by: INTERNAL MEDICINE

## 2018-11-19 PROCEDURE — 82306 VITAMIN D 25 HYDROXY: CPT | Performed by: INTERNAL MEDICINE

## 2018-11-19 PROCEDURE — 85025 COMPLETE CBC W/AUTO DIFF WBC: CPT

## 2018-11-19 PROCEDURE — 80053 COMPREHEN METABOLIC PANEL: CPT

## 2018-11-19 PROCEDURE — 84100 ASSAY OF PHOSPHORUS: CPT | Performed by: INTERNAL MEDICINE

## 2018-12-03 RX ORDER — FAMOTIDINE 20 MG/1
TABLET, FILM COATED ORAL
Qty: 60 TABLET | Refills: 3 | Status: SHIPPED | OUTPATIENT
Start: 2018-12-03 | End: 2019-02-19 | Stop reason: SDUPTHER

## 2018-12-17 RX ORDER — DIAZEPAM 5 MG/1
TABLET ORAL
Qty: 30 TABLET | Refills: 0 | OUTPATIENT
Start: 2018-12-17 | End: 2019-01-19 | Stop reason: SDUPTHER

## 2018-12-17 NOTE — TELEPHONE ENCOUNTER
Per Dr. Andrade, Refill has been called to [facility] in [place] for her Diazepam 5 mg #30 with No Refills.    Sent back to Dr. Andrade for signature

## 2018-12-17 NOTE — TELEPHONE ENCOUNTER
Dr. Hamilton's patient Ms. Whitman is requesting a refill on her Valium 5 mg.  Take 1 tab HS #30 tabs    Last OV 10/30/18    Next OV 01/30/19    Last Script written 10/15/18  #30 with 1 refill    Last ALE  10/15/18    Please Advise on refills    Thank you

## 2019-02-19 ENCOUNTER — OFFICE VISIT (OUTPATIENT)
Dept: FAMILY MEDICINE CLINIC | Facility: CLINIC | Age: 49
End: 2019-02-19

## 2019-02-19 ENCOUNTER — LAB (OUTPATIENT)
Dept: LAB | Facility: HOSPITAL | Age: 49
End: 2019-02-19

## 2019-02-19 VITALS
OXYGEN SATURATION: 98 % | DIASTOLIC BLOOD PRESSURE: 72 MMHG | HEART RATE: 112 BPM | HEIGHT: 67 IN | WEIGHT: 204.31 LBS | BODY MASS INDEX: 32.07 KG/M2 | SYSTOLIC BLOOD PRESSURE: 128 MMHG

## 2019-02-19 DIAGNOSIS — I10 ESSENTIAL HYPERTENSION: Primary | ICD-10-CM

## 2019-02-19 DIAGNOSIS — F41.9 ANXIETY: ICD-10-CM

## 2019-02-19 DIAGNOSIS — F79 INTELLECTUAL DISABILITY: ICD-10-CM

## 2019-02-19 DIAGNOSIS — I10 ESSENTIAL HYPERTENSION: ICD-10-CM

## 2019-02-19 LAB
ALBUMIN SERPL-MCNC: 4.7 G/DL (ref 3.4–4.8)
ALBUMIN/GLOB SERPL: 1.4 G/DL (ref 1.1–1.8)
ALP SERPL-CCNC: 87 U/L (ref 38–126)
ALT SERPL W P-5'-P-CCNC: 28 U/L (ref 9–52)
ANION GAP SERPL CALCULATED.3IONS-SCNC: 11 MMOL/L (ref 5–15)
AST SERPL-CCNC: 26 U/L (ref 14–36)
BASOPHILS # BLD AUTO: 0.06 10*3/MM3 (ref 0–0.2)
BASOPHILS NFR BLD AUTO: 0.7 % (ref 0–1.5)
BILIRUB SERPL-MCNC: 0.4 MG/DL (ref 0.2–1.3)
BUN BLD-MCNC: 20 MG/DL (ref 7–21)
BUN/CREAT SERPL: 15 (ref 7–25)
CALCIUM SPEC-SCNC: 10 MG/DL (ref 8.4–10.2)
CHLORIDE SERPL-SCNC: 98 MMOL/L (ref 95–110)
CO2 SERPL-SCNC: 27 MMOL/L (ref 22–31)
CREAT BLD-MCNC: 1.33 MG/DL (ref 0.5–1)
DEPRECATED RDW RBC AUTO: 42.1 FL (ref 37–54)
EOSINOPHIL # BLD AUTO: 0.16 10*3/MM3 (ref 0–0.4)
EOSINOPHIL NFR BLD AUTO: 1.8 % (ref 0.3–6.2)
ERYTHROCYTE [DISTWIDTH] IN BLOOD BY AUTOMATED COUNT: 13 % (ref 12.3–15.4)
GFR SERPL CREATININE-BSD FRML MDRD: 43 ML/MIN/1.73 (ref 58–135)
GLOBULIN UR ELPH-MCNC: 3.3 GM/DL (ref 2.3–3.5)
GLUCOSE BLD-MCNC: 105 MG/DL (ref 60–100)
HCT VFR BLD AUTO: 41.8 % (ref 34–46.6)
HGB BLD-MCNC: 13.7 G/DL (ref 12–15.9)
IMM GRANULOCYTES # BLD AUTO: 0.11 10*3/MM3 (ref 0–0.05)
IMM GRANULOCYTES NFR BLD AUTO: 1.3 % (ref 0–0.5)
LYMPHOCYTES # BLD AUTO: 1.48 10*3/MM3 (ref 0.7–3.1)
LYMPHOCYTES NFR BLD AUTO: 17.1 % (ref 19.6–45.3)
MCH RBC QN AUTO: 29.3 PG (ref 26.6–33)
MCHC RBC AUTO-ENTMCNC: 32.8 G/DL (ref 31.5–35.7)
MCV RBC AUTO: 89.3 FL (ref 79–97)
MONOCYTES # BLD AUTO: 0.83 10*3/MM3 (ref 0.1–0.9)
MONOCYTES NFR BLD AUTO: 9.6 % (ref 5–12)
NEUTROPHILS # BLD AUTO: 6.03 10*3/MM3 (ref 1.4–7)
NEUTROPHILS NFR BLD AUTO: 69.5 % (ref 42.7–76)
NRBC BLD AUTO-RTO: 0 /100 WBC (ref 0–0)
PLATELET # BLD AUTO: 401 10*3/MM3 (ref 140–450)
PMV BLD AUTO: 8.9 FL (ref 6–12)
POTASSIUM BLD-SCNC: 3.7 MMOL/L (ref 3.5–5.1)
PROT SERPL-MCNC: 8 G/DL (ref 6.3–8.6)
RBC # BLD AUTO: 4.68 10*6/MM3 (ref 3.77–5.28)
SODIUM BLD-SCNC: 136 MMOL/L (ref 137–145)
WBC NRBC COR # BLD: 8.67 10*3/MM3 (ref 3.4–10.8)

## 2019-02-19 PROCEDURE — 85025 COMPLETE CBC W/AUTO DIFF WBC: CPT

## 2019-02-19 PROCEDURE — 80053 COMPREHEN METABOLIC PANEL: CPT

## 2019-02-19 PROCEDURE — 36415 COLL VENOUS BLD VENIPUNCTURE: CPT

## 2019-02-19 PROCEDURE — 99214 OFFICE O/P EST MOD 30 MIN: CPT | Performed by: FAMILY MEDICINE

## 2019-02-19 RX ORDER — FLUCONAZOLE 150 MG/1
150 TABLET ORAL ONCE
Qty: 3 TABLET | Refills: 0 | Status: SHIPPED | OUTPATIENT
Start: 2019-02-19 | End: 2019-02-19

## 2019-02-19 RX ORDER — DIAZEPAM 5 MG/1
5 TABLET ORAL
Qty: 30 TABLET | Refills: 2 | Status: SHIPPED | OUTPATIENT
Start: 2019-02-19 | End: 2019-05-20 | Stop reason: SDUPTHER

## 2019-02-19 RX ORDER — FAMOTIDINE 20 MG/1
20 TABLET, FILM COATED ORAL 2 TIMES DAILY
Qty: 60 TABLET | Refills: 3 | Status: SHIPPED | OUTPATIENT
Start: 2019-02-19 | End: 2019-08-20 | Stop reason: SDUPTHER

## 2019-02-19 RX ORDER — METOPROLOL SUCCINATE 25 MG/1
25 TABLET, EXTENDED RELEASE ORAL DAILY
Qty: 90 TABLET | Refills: 1 | Status: SHIPPED | OUTPATIENT
Start: 2019-02-19 | End: 2019-09-30 | Stop reason: SDUPTHER

## 2019-02-19 NOTE — PROGRESS NOTES
Subjective   Teodora Whitman is a 48 y.o. female.    cc: follow up  History of Present Illness The patient comes in for check of their chronic medical issues which include hypertension,anxiety and Mental Developmental Delay .      The following portions of the patient's history were reviewed and updated as appropriate: allergies, current medications, past family history, past medical history, past social history, past surgical history and problem list.    Review of Systems   Constitutional: Negative for fatigue and fever.   Respiratory: Negative for cough, chest tightness and stridor.    Cardiovascular: Negative for chest pain, palpitations and leg swelling.       Objective   Physical Exam   Constitutional: She appears well-developed and well-nourished.   HENT:   Head: Normocephalic and atraumatic.   Left Ear: External ear normal.   Nose: Nose normal.   Mouth/Throat: Oropharynx is clear and moist.   Eyes: Pupils are equal, round, and reactive to light.   Cardiovascular: Normal rate, regular rhythm and normal heart sounds. Exam reveals no gallop and no friction rub.   No murmur heard.  Pulmonary/Chest: Effort normal and breath sounds normal. No stridor. No respiratory distress. She has no wheezes. She has no rales.   Abdominal: Soft. Bowel sounds are normal. She exhibits no distension. There is no tenderness.   Vitals reviewed.        Assessment/Plan   Teodora was seen today for follow-up.    Diagnoses and all orders for this visit:    Essential hypertension    Unspecified intellectual disabilities  -     diazePAM (VALIUM) 5 MG tablet; Take 1 tablet by mouth every night at bedtime.    Anxiety    Other orders  -     metoprolol succinate XL (TOPROL-XL) 25 MG 24 hr tablet; Take 1 tablet by mouth Daily.  -     famotidine (PEPCID) 20 MG tablet; Take 1 tablet by mouth 2 (Two) Times a Day.  -     fluconazole (DIFLUCAN) 150 MG tablet; Take 1 tablet by mouth 1 (One) Time for 1 dose.          [facility]-[id number] was appropriate     Return to the clinic in 3 month/s.  Will contact with results as needed.

## 2019-04-14 DIAGNOSIS — F79 INTELLECTUAL DISABILITY: ICD-10-CM

## 2019-04-15 RX ORDER — DIAZEPAM 5 MG/1
5 TABLET ORAL
Qty: 30 TABLET | Refills: 0 | Status: SHIPPED | OUTPATIENT
Start: 2019-04-15 | End: 2019-05-20 | Stop reason: SDUPTHER

## 2019-05-06 RX ORDER — CITALOPRAM 40 MG/1
TABLET ORAL
Qty: 90 TABLET | Refills: 2 | Status: SHIPPED | OUTPATIENT
Start: 2019-05-06 | End: 2019-05-20 | Stop reason: SDUPTHER

## 2019-05-20 ENCOUNTER — OFFICE VISIT (OUTPATIENT)
Dept: FAMILY MEDICINE CLINIC | Facility: CLINIC | Age: 49
End: 2019-05-20

## 2019-05-20 VITALS
HEIGHT: 67 IN | WEIGHT: 213.56 LBS | DIASTOLIC BLOOD PRESSURE: 80 MMHG | SYSTOLIC BLOOD PRESSURE: 136 MMHG | BODY MASS INDEX: 33.52 KG/M2 | HEART RATE: 118 BPM | OXYGEN SATURATION: 98 %

## 2019-05-20 DIAGNOSIS — F79 INTELLECTUAL DISABILITY: ICD-10-CM

## 2019-05-20 DIAGNOSIS — J30.89 NON-SEASONAL ALLERGIC RHINITIS DUE TO OTHER ALLERGIC TRIGGER: Primary | ICD-10-CM

## 2019-05-20 DIAGNOSIS — K21.9 GASTROESOPHAGEAL REFLUX DISEASE WITHOUT ESOPHAGITIS: ICD-10-CM

## 2019-05-20 PROCEDURE — 99214 OFFICE O/P EST MOD 30 MIN: CPT | Performed by: FAMILY MEDICINE

## 2019-05-20 RX ORDER — TRIAMCINOLONE ACETONIDE 40 MG/ML
80 INJECTION, SUSPENSION INTRA-ARTICULAR; INTRAMUSCULAR ONCE
Status: CANCELLED | OUTPATIENT
Start: 2019-05-20 | End: 2019-05-20

## 2019-05-20 RX ORDER — LAMOTRIGINE 150 MG/1
150 TABLET ORAL 2 TIMES DAILY
Qty: 180 TABLET | Refills: 2 | Status: SHIPPED | OUTPATIENT
Start: 2019-05-20 | End: 2019-08-20 | Stop reason: SDUPTHER

## 2019-05-20 RX ORDER — ERGOCALCIFEROL 1.25 MG/1
50000 CAPSULE ORAL
Qty: 8 CAPSULE | Refills: 2 | Status: SHIPPED | OUTPATIENT
Start: 2019-05-20 | End: 2020-03-19 | Stop reason: SDUPTHER

## 2019-05-20 RX ORDER — DIAZEPAM 5 MG/1
5 TABLET ORAL
Qty: 30 TABLET | Refills: 2 | Status: SHIPPED | OUTPATIENT
Start: 2019-05-20 | End: 2019-08-20 | Stop reason: SDUPTHER

## 2019-05-20 RX ORDER — HYDROCHLOROTHIAZIDE 12.5 MG/1
12.5 CAPSULE, GELATIN COATED ORAL DAILY
Qty: 90 CAPSULE | Refills: 2 | Status: SHIPPED | OUTPATIENT
Start: 2019-05-20 | End: 2020-03-19 | Stop reason: SDUPTHER

## 2019-05-20 RX ORDER — PREDNISONE 10 MG/1
TABLET ORAL
Qty: 30 TABLET | Refills: 0 | Status: SHIPPED | OUTPATIENT
Start: 2019-05-20 | End: 2019-08-20

## 2019-05-20 RX ORDER — CITALOPRAM 40 MG/1
40 TABLET ORAL DAILY
Qty: 90 TABLET | Refills: 2 | Status: SHIPPED | OUTPATIENT
Start: 2019-05-20 | End: 2020-03-19 | Stop reason: SDUPTHER

## 2019-05-20 RX ORDER — MEGESTROL ACETATE 40 MG/ML
40 SUSPENSION ORAL DAILY
Qty: 240 ML | Refills: 3 | Status: SHIPPED | OUTPATIENT
Start: 2019-05-20 | End: 2020-02-20 | Stop reason: SDUPTHER

## 2019-05-20 NOTE — PROGRESS NOTES
Subjective   Teodora Whitman is a 49 y.o. female.    cc:follow up  History of Present Illness The patient comes in for check of their chronic medical issues which include Allergic Rhinitis,GERD and mental delayed. She is at her base line.She is having some issues with her allergic rhinitis. More drainage and congestion..      The following portions of the patient's history were reviewed and updated as appropriate: allergies, current medications, past family history, past medical history, past social history, past surgical history and problem list.    Review of Systems   Constitutional: Negative for fatigue and fever.   Respiratory: Negative for cough, chest tightness and stridor.    Cardiovascular: Negative for chest pain, palpitations and leg swelling.       Objective   Physical Exam   Constitutional: She appears well-developed and well-nourished.   HENT:   Head: Normocephalic and atraumatic.   Right Ear: External ear normal.   Left Ear: External ear normal.   Nose: Nose normal.   Mouth/Throat: Oropharynx is clear and moist.   Eyes: Conjunctivae are normal.   Neck: Normal range of motion.   Cardiovascular: Normal rate and regular rhythm. Exam reveals no gallop and no friction rub.   No murmur heard.  Pulmonary/Chest: Effort normal and breath sounds normal. No stridor. No respiratory distress. She has no wheezes. She has no rales.   Abdominal: Soft. Bowel sounds are normal. She exhibits no distension. There is no tenderness. There is no guarding.   Neurological: She is alert.   Skin: Skin is warm and dry.   Vitals reviewed.        Assessment/Plan   Teodora was seen today for follow-up.    Diagnoses and all orders for this visit:    Non-seasonal allergic rhinitis due to other allergic trigger    Unspecified intellectual disabilities  -     diazePAM (VALIUM) 5 MG tablet; Take 1 tablet by mouth every night at bedtime.    Gastroesophageal reflux disease without esophagitis  -     Comprehensive metabolic panel;  Future  -     CBC w AUTO Differential; Future    Other orders  -     vitamin D (ERGOCALCIFEROL) 09832 units capsule capsule; Take 1 capsule by mouth Every 14 (Fourteen) Days.  -     megestrol (MEGACE) 40 MG/ML suspension; Take 1 mL by mouth Daily. 1 teaaspoon  -     lamoTRIgine (LaMICtal) 150 MG tablet; Take 1 tablet by mouth 2 (Two) Times a Day.  -     hydrochlorothiazide (MICROZIDE) 12.5 MG capsule; Take 1 capsule by mouth Daily.  -     citalopram (CeleXA) 40 MG tablet; Take 1 tablet by mouth Daily.  -     Cancel: triamcinolone acetonide (KENALOG-40) injection 80 mg  -     predniSONE (DELTASONE) 10 MG tablet; 4 daily times three days, 3 daily times three days, 2 daily times three days,1 daily times three days        Return to the clinic in 3 month/s.  Will contact with results as needed.

## 2019-05-24 ENCOUNTER — OFFICE VISIT (OUTPATIENT)
Dept: FAMILY MEDICINE CLINIC | Facility: CLINIC | Age: 49
End: 2019-05-24

## 2019-05-24 ENCOUNTER — TELEPHONE (OUTPATIENT)
Dept: FAMILY MEDICINE CLINIC | Facility: CLINIC | Age: 49
End: 2019-05-24

## 2019-05-24 VITALS
BODY MASS INDEX: 32.73 KG/M2 | HEIGHT: 67 IN | TEMPERATURE: 99.9 F | WEIGHT: 208.56 LBS | OXYGEN SATURATION: 97 % | HEART RATE: 125 BPM | DIASTOLIC BLOOD PRESSURE: 82 MMHG | SYSTOLIC BLOOD PRESSURE: 148 MMHG

## 2019-05-24 DIAGNOSIS — R05.9 COUGH: ICD-10-CM

## 2019-05-24 DIAGNOSIS — J40 BRONCHITIS: Primary | ICD-10-CM

## 2019-05-24 PROCEDURE — 99214 OFFICE O/P EST MOD 30 MIN: CPT | Performed by: FAMILY MEDICINE

## 2019-05-24 NOTE — TELEPHONE ENCOUNTER
[facility] does not have tussionex but [facility] does can you send it there instead? They can't transfer it because it's a controlled.

## 2019-05-24 NOTE — PROGRESS NOTES
Subjective   Teodora Whitman is a 49 y.o. female.    cc: follow up  History of Present Illness The patient comes in coughing still. She has been put on Ceftin. She still is coughing quite a bit.    The following portions of the patient's history were reviewed and updated as appropriate: allergies, current medications, past family history, past medical history, past social history, past surgical history and problem list.    Review of Systems   Constitutional: Negative for fatigue and fever.   Respiratory: Positive for cough. Negative for chest tightness, wheezing and stridor.        Objective   Physical Exam   Constitutional: She appears well-developed and well-nourished.   HENT:   Head: Normocephalic and atraumatic.   Right Ear: External ear normal.   Left Ear: External ear normal.   Nose: Nose normal.   Eyes: Conjunctivae are normal.   Neck: Normal range of motion.   Cardiovascular: Normal rate, regular rhythm and normal heart sounds. Exam reveals no gallop and no friction rub.   No murmur heard.  Pulmonary/Chest: Effort normal and breath sounds normal. No stridor. No respiratory distress. She has no wheezes. She has no rales.   Abdominal: Soft. Bowel sounds are normal. She exhibits no distension. There is no tenderness. There is no guarding.   Skin: Skin is warm and dry.   Vitals reviewed.        Assessment/Plan   Teodora was seen today for cough and sore throat.    Diagnoses and all orders for this visit:    Bronchitis    Cough    Other orders  -     HYDROcod Polst-CPM Polst ER (TUSSIONEX PENNKINETIC ER) 10-8 MG/5ML ER suspension; Take 5 mL by mouth Every 12 (Twelve) Hours As Needed for Cough.      Return to the clinic in 3 month/s.  Will contact with results as needed.    Continue with current medications.  ALE is appropriate-[id number]

## 2019-08-20 ENCOUNTER — OFFICE VISIT (OUTPATIENT)
Dept: FAMILY MEDICINE CLINIC | Facility: CLINIC | Age: 49
End: 2019-08-20

## 2019-08-20 ENCOUNTER — LAB (OUTPATIENT)
Dept: LAB | Facility: HOSPITAL | Age: 49
End: 2019-08-20

## 2019-08-20 VITALS
BODY MASS INDEX: 34.24 KG/M2 | WEIGHT: 218.13 LBS | SYSTOLIC BLOOD PRESSURE: 128 MMHG | DIASTOLIC BLOOD PRESSURE: 62 MMHG | HEIGHT: 67 IN | OXYGEN SATURATION: 98 % | HEART RATE: 108 BPM

## 2019-08-20 DIAGNOSIS — R60.9 EDEMA, UNSPECIFIED TYPE: Primary | ICD-10-CM

## 2019-08-20 DIAGNOSIS — R61 NIGHT SWEATS: ICD-10-CM

## 2019-08-20 DIAGNOSIS — D50.8 OTHER IRON DEFICIENCY ANEMIA: ICD-10-CM

## 2019-08-20 DIAGNOSIS — F79 INTELLECTUAL DISABILITY: ICD-10-CM

## 2019-08-20 DIAGNOSIS — K21.9 GASTROESOPHAGEAL REFLUX DISEASE WITHOUT ESOPHAGITIS: ICD-10-CM

## 2019-08-20 PROCEDURE — 80053 COMPREHEN METABOLIC PANEL: CPT

## 2019-08-20 PROCEDURE — 36415 COLL VENOUS BLD VENIPUNCTURE: CPT

## 2019-08-20 PROCEDURE — 83002 ASSAY OF GONADOTROPIN (LH): CPT

## 2019-08-20 PROCEDURE — 99214 OFFICE O/P EST MOD 30 MIN: CPT | Performed by: FAMILY MEDICINE

## 2019-08-20 PROCEDURE — 85025 COMPLETE CBC W/AUTO DIFF WBC: CPT

## 2019-08-20 PROCEDURE — 83001 ASSAY OF GONADOTROPIN (FSH): CPT

## 2019-08-20 RX ORDER — DIAZEPAM 5 MG/1
5 TABLET ORAL
Qty: 30 TABLET | Refills: 2 | Status: SHIPPED | OUTPATIENT
Start: 2019-08-20 | End: 2019-11-20 | Stop reason: SDUPTHER

## 2019-08-20 RX ORDER — FAMOTIDINE 20 MG/1
20 TABLET, FILM COATED ORAL 2 TIMES DAILY
Qty: 60 TABLET | Refills: 3 | Status: SHIPPED | OUTPATIENT
Start: 2019-08-20 | End: 2019-11-20 | Stop reason: SDUPTHER

## 2019-08-20 RX ORDER — LAMOTRIGINE 150 MG/1
150 TABLET ORAL 2 TIMES DAILY
Qty: 180 TABLET | Refills: 2 | Status: SHIPPED | OUTPATIENT
Start: 2019-08-20 | End: 2020-02-20 | Stop reason: SDUPTHER

## 2019-08-20 RX ORDER — CETIRIZINE HYDROCHLORIDE 10 MG/1
10 TABLET ORAL DAILY
Qty: 90 TABLET | Refills: 2 | Status: SHIPPED | OUTPATIENT
Start: 2019-08-20 | End: 2020-05-19

## 2019-08-20 NOTE — PROGRESS NOTES
Subjective   Teodora Whitman is a 49 y.o. female.         Cc:follow up  History of Present Illness The patient comes in for check of their chronic medical issues which include GERD,unspecified intellectual disabilities. She is at her base line .      The following portions of the patient's history were reviewed and updated as appropriate: allergies, current medications, past family history, past medical history, past social history, past surgical history and problem list.    Review of Systems   Constitutional: Negative for fatigue and fever.   Respiratory: Negative for cough, chest tightness and stridor.    Cardiovascular: Negative for chest pain, palpitations and leg swelling.       Objective   Physical Exam   Constitutional: She is oriented to person, place, and time. She appears well-developed and well-nourished.   HENT:   Head: Normocephalic and atraumatic.   Right Ear: External ear normal.   Left Ear: External ear normal.   Nose: Nose normal.   Mouth/Throat: Oropharynx is clear and moist.   Eyes: Conjunctivae are normal.   Cardiovascular: Normal rate, regular rhythm and normal heart sounds. Exam reveals no gallop and no friction rub.   No murmur heard.  Pulmonary/Chest: Effort normal and breath sounds normal. No stridor. No respiratory distress. She has no wheezes. She has no rales.   Abdominal: Soft. Bowel sounds are normal. She exhibits no distension and no mass. There is no tenderness. There is no guarding.   Neurological: She is alert and oriented to person, place, and time.   Skin: Skin is warm and dry.   Vitals reviewed.        Assessment/Plan   Teodora was seen today for follow-up, hypertension and anxiety.    Diagnoses and all orders for this visit:    Edema, unspecified type    Unspecified intellectual disabilities  -     diazePAM (VALIUM) 5 MG tablet; Take 1 tablet by mouth every night at bedtime.    Gastroesophageal reflux disease without esophagitis  -     Comprehensive metabolic panel;  Future    Other iron deficiency anemia  -     CBC w AUTO Differential; Future    Night sweats  -     Follicle stimulating hormone; Future  -     Luteinizing hormone; Future    Other orders  -     famotidine (PEPCID) 20 MG tablet; Take 1 tablet by mouth 2 (Two) Times a Day.  -     lamoTRIgine (LaMICtal) 150 MG tablet; Take 1 tablet by mouth 2 (Two) Times a Day.  -     cetirizine (zyrTEC) 10 MG tablet; Take 1 tablet by mouth Daily.    Return to the clinic in 3 month/s.  Will contact with results as needed.  ALE appropriate. [id number]

## 2019-08-21 LAB
ALBUMIN SERPL-MCNC: 4.7 G/DL (ref 3.5–5.2)
ALBUMIN/GLOB SERPL: 1.6 G/DL
ALP SERPL-CCNC: 95 U/L (ref 39–117)
ALT SERPL W P-5'-P-CCNC: 20 U/L (ref 1–33)
ANION GAP SERPL CALCULATED.3IONS-SCNC: 15.7 MMOL/L (ref 5–15)
AST SERPL-CCNC: 16 U/L (ref 1–32)
BASOPHILS # BLD AUTO: 0.07 10*3/MM3 (ref 0–0.2)
BASOPHILS NFR BLD AUTO: 0.8 % (ref 0–1.5)
BILIRUB SERPL-MCNC: 0.2 MG/DL (ref 0.2–1.2)
BUN BLD-MCNC: 16 MG/DL (ref 6–20)
BUN/CREAT SERPL: 12.4 (ref 7–25)
CALCIUM SPEC-SCNC: 9.9 MG/DL (ref 8.6–10.5)
CHLORIDE SERPL-SCNC: 99 MMOL/L (ref 98–107)
CO2 SERPL-SCNC: 27.3 MMOL/L (ref 22–29)
CREAT BLD-MCNC: 1.29 MG/DL (ref 0.57–1)
DEPRECATED RDW RBC AUTO: 46.4 FL (ref 37–54)
EOSINOPHIL # BLD AUTO: 0.13 10*3/MM3 (ref 0–0.4)
EOSINOPHIL NFR BLD AUTO: 1.5 % (ref 0.3–6.2)
ERYTHROCYTE [DISTWIDTH] IN BLOOD BY AUTOMATED COUNT: 13.8 % (ref 12.3–15.4)
FSH SERPL-ACNC: 73.8 MIU/ML
GFR SERPL CREATININE-BSD FRML MDRD: 44 ML/MIN/1.73
GLOBULIN UR ELPH-MCNC: 2.9 GM/DL
GLUCOSE BLD-MCNC: 97 MG/DL (ref 65–99)
HCT VFR BLD AUTO: 42.6 % (ref 34–46.6)
HGB BLD-MCNC: 13.5 G/DL (ref 12–15.9)
IMM GRANULOCYTES # BLD AUTO: 0.18 10*3/MM3 (ref 0–0.05)
IMM GRANULOCYTES NFR BLD AUTO: 2.1 % (ref 0–0.5)
LH SERPL-ACNC: 25.8 MIU/ML
LYMPHOCYTES # BLD AUTO: 1.28 10*3/MM3 (ref 0.7–3.1)
LYMPHOCYTES NFR BLD AUTO: 14.8 % (ref 19.6–45.3)
MCH RBC QN AUTO: 28.6 PG (ref 26.6–33)
MCHC RBC AUTO-ENTMCNC: 31.7 G/DL (ref 31.5–35.7)
MCV RBC AUTO: 90.3 FL (ref 79–97)
MONOCYTES # BLD AUTO: 0.69 10*3/MM3 (ref 0.1–0.9)
MONOCYTES NFR BLD AUTO: 8 % (ref 5–12)
NEUTROPHILS # BLD AUTO: 6.28 10*3/MM3 (ref 1.7–7)
NEUTROPHILS NFR BLD AUTO: 72.8 % (ref 42.7–76)
NRBC BLD AUTO-RTO: 0 /100 WBC (ref 0–0.2)
PLATELET # BLD AUTO: 391 10*3/MM3 (ref 140–450)
PMV BLD AUTO: 9.4 FL (ref 6–12)
POTASSIUM BLD-SCNC: 3.8 MMOL/L (ref 3.5–5.2)
PROT SERPL-MCNC: 7.6 G/DL (ref 6–8.5)
RBC # BLD AUTO: 4.72 10*6/MM3 (ref 3.77–5.28)
SODIUM BLD-SCNC: 142 MMOL/L (ref 136–145)
WBC NRBC COR # BLD: 8.63 10*3/MM3 (ref 3.4–10.8)

## 2019-10-01 RX ORDER — METOPROLOL SUCCINATE 25 MG/1
TABLET, EXTENDED RELEASE ORAL
Qty: 90 TABLET | Refills: 1 | Status: SHIPPED | OUTPATIENT
Start: 2019-10-01 | End: 2020-02-20 | Stop reason: SDUPTHER

## 2019-10-09 ENCOUNTER — TELEPHONE (OUTPATIENT)
Dept: FAMILY MEDICINE CLINIC | Facility: CLINIC | Age: 49
End: 2019-10-09

## 2019-11-20 ENCOUNTER — OFFICE VISIT (OUTPATIENT)
Dept: FAMILY MEDICINE CLINIC | Facility: CLINIC | Age: 49
End: 2019-11-20

## 2019-11-20 VITALS
SYSTOLIC BLOOD PRESSURE: 146 MMHG | HEIGHT: 67 IN | HEART RATE: 115 BPM | WEIGHT: 222.56 LBS | DIASTOLIC BLOOD PRESSURE: 74 MMHG | OXYGEN SATURATION: 96 % | BODY MASS INDEX: 34.93 KG/M2

## 2019-11-20 DIAGNOSIS — K21.9 GASTROESOPHAGEAL REFLUX DISEASE WITHOUT ESOPHAGITIS: Primary | ICD-10-CM

## 2019-11-20 DIAGNOSIS — F79 INTELLECTUAL DISABILITY: ICD-10-CM

## 2019-11-20 DIAGNOSIS — I10 ESSENTIAL HYPERTENSION: ICD-10-CM

## 2019-11-20 PROCEDURE — 99214 OFFICE O/P EST MOD 30 MIN: CPT | Performed by: FAMILY MEDICINE

## 2019-11-20 RX ORDER — DIAZEPAM 5 MG/1
5 TABLET ORAL
Qty: 30 TABLET | Refills: 2 | Status: SHIPPED | OUTPATIENT
Start: 2019-11-20 | End: 2020-02-20 | Stop reason: SDUPTHER

## 2019-11-20 RX ORDER — FAMOTIDINE 20 MG/1
20 TABLET, FILM COATED ORAL 2 TIMES DAILY
Qty: 180 TABLET | Refills: 3 | Status: SHIPPED | OUTPATIENT
Start: 2019-11-20 | End: 2021-05-04 | Stop reason: SDUPTHER

## 2019-12-02 ENCOUNTER — TRANSCRIBE ORDERS (OUTPATIENT)
Dept: LAB | Facility: HOSPITAL | Age: 49
End: 2019-12-02

## 2019-12-02 ENCOUNTER — LAB (OUTPATIENT)
Dept: LAB | Facility: HOSPITAL | Age: 49
End: 2019-12-02

## 2019-12-02 DIAGNOSIS — F79 INTELLECTUAL DISABILITY: ICD-10-CM

## 2019-12-02 DIAGNOSIS — C81.90 HODGKIN LYMPHOMA, UNSPECIFIED HODGKIN LYMPHOMA TYPE, UNSPECIFIED BODY REGION (HCC): ICD-10-CM

## 2019-12-02 DIAGNOSIS — D50.9 IRON DEFICIENCY ANEMIA, UNSPECIFIED IRON DEFICIENCY ANEMIA TYPE: ICD-10-CM

## 2019-12-02 DIAGNOSIS — K21.9 GASTROESOPHAGEAL REFLUX DISEASE WITHOUT ESOPHAGITIS: ICD-10-CM

## 2019-12-02 DIAGNOSIS — K21.9 GASTROESOPHAGEAL REFLUX DISEASE WITHOUT ESOPHAGITIS: Primary | ICD-10-CM

## 2019-12-02 DIAGNOSIS — N18.30 CHRONIC KIDNEY DISEASE, STAGE III (MODERATE) (HCC): ICD-10-CM

## 2019-12-02 PROCEDURE — 82746 ASSAY OF FOLIC ACID SERUM: CPT

## 2019-12-02 PROCEDURE — 82728 ASSAY OF FERRITIN: CPT

## 2019-12-02 PROCEDURE — 85014 HEMATOCRIT: CPT

## 2019-12-02 PROCEDURE — 83735 ASSAY OF MAGNESIUM: CPT

## 2019-12-02 PROCEDURE — 85018 HEMOGLOBIN: CPT

## 2019-12-02 PROCEDURE — 80053 COMPREHEN METABOLIC PANEL: CPT

## 2019-12-02 PROCEDURE — 82607 VITAMIN B-12: CPT

## 2019-12-02 PROCEDURE — 82306 VITAMIN D 25 HYDROXY: CPT

## 2019-12-02 PROCEDURE — 36415 COLL VENOUS BLD VENIPUNCTURE: CPT

## 2019-12-03 LAB
25(OH)D3 SERPL-MCNC: 25.6 NG/ML (ref 30–100)
ALBUMIN SERPL-MCNC: 4.6 G/DL (ref 3.5–5.2)
ALBUMIN/GLOB SERPL: 1.5 G/DL
ALP SERPL-CCNC: 84 U/L (ref 39–117)
ALT SERPL W P-5'-P-CCNC: 19 U/L (ref 1–33)
ANION GAP SERPL CALCULATED.3IONS-SCNC: 15.3 MMOL/L (ref 5–15)
AST SERPL-CCNC: 16 U/L (ref 1–32)
BILIRUB SERPL-MCNC: 0.2 MG/DL (ref 0.2–1.2)
BUN BLD-MCNC: 18 MG/DL (ref 6–20)
BUN/CREAT SERPL: 14.1 (ref 7–25)
CALCIUM SPEC-SCNC: 9.8 MG/DL (ref 8.6–10.5)
CHLORIDE SERPL-SCNC: 100 MMOL/L (ref 98–107)
CO2 SERPL-SCNC: 24.7 MMOL/L (ref 22–29)
CREAT BLD-MCNC: 1.28 MG/DL (ref 0.57–1)
FERRITIN SERPL-MCNC: 32.5 NG/ML (ref 13–150)
FOLATE SERPL-MCNC: 11.9 NG/ML (ref 4.78–24.2)
GFR SERPL CREATININE-BSD FRML MDRD: 45 ML/MIN/1.73
GLOBULIN UR ELPH-MCNC: 3 GM/DL
GLUCOSE BLD-MCNC: 101 MG/DL (ref 65–99)
HCT VFR BLD AUTO: 40.3 % (ref 34–46.6)
HGB BLD-MCNC: 13.1 G/DL (ref 12–15.9)
MAGNESIUM SERPL-MCNC: 2.6 MG/DL (ref 1.6–2.6)
POTASSIUM BLD-SCNC: 3.8 MMOL/L (ref 3.5–5.2)
PROT SERPL-MCNC: 7.6 G/DL (ref 6–8.5)
SODIUM BLD-SCNC: 140 MMOL/L (ref 136–145)
VIT B12 BLD-MCNC: 267 PG/ML (ref 211–946)

## 2020-02-20 ENCOUNTER — OFFICE VISIT (OUTPATIENT)
Dept: FAMILY MEDICINE CLINIC | Facility: CLINIC | Age: 50
End: 2020-02-20

## 2020-02-20 VITALS
BODY MASS INDEX: 33.24 KG/M2 | HEIGHT: 67 IN | DIASTOLIC BLOOD PRESSURE: 82 MMHG | HEART RATE: 108 BPM | SYSTOLIC BLOOD PRESSURE: 122 MMHG | OXYGEN SATURATION: 95 % | WEIGHT: 211.8 LBS

## 2020-02-20 DIAGNOSIS — F79 INTELLECTUAL DISABILITY: ICD-10-CM

## 2020-02-20 DIAGNOSIS — R45.1 AGITATION: Primary | ICD-10-CM

## 2020-02-20 DIAGNOSIS — I10 ESSENTIAL HYPERTENSION: ICD-10-CM

## 2020-02-20 DIAGNOSIS — J40 BRONCHITIS: ICD-10-CM

## 2020-02-20 PROCEDURE — 99214 OFFICE O/P EST MOD 30 MIN: CPT | Performed by: FAMILY MEDICINE

## 2020-02-20 RX ORDER — LAMOTRIGINE 150 MG/1
150 TABLET ORAL 2 TIMES DAILY
Qty: 180 TABLET | Refills: 2 | Status: SHIPPED | OUTPATIENT
Start: 2020-02-20 | End: 2020-06-19 | Stop reason: SDUPTHER

## 2020-02-20 RX ORDER — MEGESTROL ACETATE 40 MG/ML
40 SUSPENSION ORAL DAILY
Qty: 240 ML | Refills: 3 | Status: SHIPPED | OUTPATIENT
Start: 2020-02-20 | End: 2020-06-19 | Stop reason: SDUPTHER

## 2020-02-20 RX ORDER — DOXYCYCLINE 50 MG/1
100 CAPSULE ORAL 2 TIMES DAILY
Qty: 40 CAPSULE | Refills: 0 | Status: SHIPPED | OUTPATIENT
Start: 2020-02-20 | End: 2020-03-19

## 2020-02-20 RX ORDER — METOPROLOL SUCCINATE 25 MG/1
25 TABLET, EXTENDED RELEASE ORAL DAILY
Qty: 90 TABLET | Refills: 1 | Status: SHIPPED | OUTPATIENT
Start: 2020-02-20 | End: 2020-03-19 | Stop reason: SDUPTHER

## 2020-02-20 RX ORDER — DIAZEPAM 5 MG/1
5 TABLET ORAL
Qty: 30 TABLET | Refills: 2 | Status: SHIPPED | OUTPATIENT
Start: 2020-02-20 | End: 2020-03-19 | Stop reason: SDUPTHER

## 2020-02-20 NOTE — PROGRESS NOTES
"Subjective   Teodora Whitman is a 49 y.o. female.   Cc: persistent cough  HPIThe patient comes in with a history of a persistent cough. This has been going on for several weeks. She is still coughing up sputum. She has been on two antibiotics. She also has been getting agitated. She has a history of developmental delay  And she gets agitated and anxious. She needs a refill of her Valium.    The following portions of the patient's history were reviewed and updated as appropriate: allergies, current medications, past family history, past medical history, past social history, past surgical history and problem list.    Review of Systems   Constitutional: Negative for fatigue and fever.   Respiratory: Positive for cough. Negative for chest tightness and stridor.    Cardiovascular: Negative for chest pain, palpitations and leg swelling.       Objective   Physical Exam   Constitutional: She appears well-developed and well-nourished.   HENT:   Head: Normocephalic and atraumatic.   Right Ear: External ear normal.   Left Ear: External ear normal.   Nose: Nose normal.   Mouth/Throat: Oropharynx is clear and moist.   There us an area where she hit her lower lip. It look like she may have bitten it. It is healed.   Neck: Normal range of motion.   Cardiovascular: Normal rate, regular rhythm and normal heart sounds. Exam reveals no gallop and no friction rub.   No murmur heard.  Pulmonary/Chest: Effort normal and breath sounds normal. No stridor. No respiratory distress.   Abdominal: Soft. Bowel sounds are normal.   Skin: Skin is warm and dry.   Vitals reviewed.        Visit Vitals  /82   Pulse 108   Ht 170.2 cm (67\")   Wt 96.1 kg (211 lb 12.8 oz)   SpO2 95%   BMI 33.17 kg/m²     Body mass index is 33.17 kg/m².      Assessment/Plan   Teodora was seen today for follow-up.    Diagnoses and all orders for this visit:    Agitation    Unspecified intellectual disabilities  -     diazePAM (VALIUM) 5 MG tablet; Take 1 tablet by " mouth every night at bedtime.    Essential hypertension    Bronchitis    Other orders  -     megestrol (MEGACE) 40 MG/ML suspension; Take 1 mL by mouth Daily. 1 teaaspoon  -     metoprolol succinate XL (TOPROL-XL) 25 MG 24 hr tablet; Take 1 tablet by mouth Daily.  -     lamoTRIgine (LaMICtal) 150 MG tablet; Take 1 tablet by mouth 2 (Two) Times a Day.  -     doxycycline (MONODOX) 50 MG capsule; Take 2 capsules by mouth 2 (Two) Times a Day.  -     tiotropium bromide-olodaterol (STIOLTO RESPIMAT) 2.5-2.5 MCG/ACT aerosol solution inhaler; Inhale 2 puffs Daily.    ALE is appropriate-[id number]  Return to the clinic in 1 month/s.  Will contact with results as needed.

## 2020-02-27 ENCOUNTER — TELEPHONE (OUTPATIENT)
Dept: FAMILY MEDICINE CLINIC | Facility: CLINIC | Age: 50
End: 2020-02-27

## 2020-03-19 DIAGNOSIS — F79 INTELLECTUAL DISABILITY: ICD-10-CM

## 2020-03-19 RX ORDER — HYDROCHLOROTHIAZIDE 12.5 MG/1
12.5 CAPSULE, GELATIN COATED ORAL DAILY
Qty: 90 CAPSULE | Refills: 2 | Status: SHIPPED | OUTPATIENT
Start: 2020-03-19 | End: 2021-02-17 | Stop reason: SDUPTHER

## 2020-03-19 RX ORDER — METOPROLOL SUCCINATE 25 MG/1
25 TABLET, EXTENDED RELEASE ORAL DAILY
Qty: 90 TABLET | Refills: 1 | Status: SHIPPED | OUTPATIENT
Start: 2020-03-19 | End: 2020-09-18 | Stop reason: SDUPTHER

## 2020-03-19 RX ORDER — CITALOPRAM 40 MG/1
40 TABLET ORAL DAILY
Qty: 90 TABLET | Refills: 2 | Status: SHIPPED | OUTPATIENT
Start: 2020-03-19 | End: 2020-06-19 | Stop reason: SDUPTHER

## 2020-03-19 RX ORDER — DIAZEPAM 5 MG/1
5 TABLET ORAL
Qty: 30 TABLET | Refills: 2 | Status: SHIPPED | OUTPATIENT
Start: 2020-03-19 | End: 2020-06-19 | Stop reason: SDUPTHER

## 2020-03-19 RX ORDER — DIAZEPAM 5 MG/1
5 TABLET ORAL
Qty: 30 TABLET | Refills: 2 | Status: CANCELLED | OUTPATIENT
Start: 2020-03-19

## 2020-03-19 RX ORDER — ERGOCALCIFEROL 1.25 MG/1
50000 CAPSULE ORAL
Qty: 8 CAPSULE | Refills: 2 | Status: SHIPPED | OUTPATIENT
Start: 2020-03-19 | End: 2021-06-21 | Stop reason: SDUPTHER

## 2020-05-19 RX ORDER — CETIRIZINE HYDROCHLORIDE 10 MG/1
TABLET ORAL
Qty: 90 TABLET | Refills: 3 | OUTPATIENT
Start: 2020-05-19 | End: 2020-10-31

## 2020-06-19 ENCOUNTER — OFFICE VISIT (OUTPATIENT)
Dept: FAMILY MEDICINE CLINIC | Facility: CLINIC | Age: 50
End: 2020-06-19

## 2020-06-19 VITALS
HEIGHT: 67 IN | SYSTOLIC BLOOD PRESSURE: 140 MMHG | OXYGEN SATURATION: 97 % | HEART RATE: 111 BPM | DIASTOLIC BLOOD PRESSURE: 86 MMHG | BODY MASS INDEX: 32.98 KG/M2 | WEIGHT: 210.1 LBS

## 2020-06-19 DIAGNOSIS — I10 ESSENTIAL HYPERTENSION: Primary | ICD-10-CM

## 2020-06-19 DIAGNOSIS — J30.9 ALLERGIC RHINITIS, UNSPECIFIED SEASONALITY, UNSPECIFIED TRIGGER: ICD-10-CM

## 2020-06-19 DIAGNOSIS — K21.9 GASTROESOPHAGEAL REFLUX DISEASE WITHOUT ESOPHAGITIS: ICD-10-CM

## 2020-06-19 DIAGNOSIS — F79 INTELLECTUAL DISABILITY: ICD-10-CM

## 2020-06-19 DIAGNOSIS — R62.50 DEVELOPMENTAL DELAY, PROFOUND: ICD-10-CM

## 2020-06-19 PROCEDURE — 99214 OFFICE O/P EST MOD 30 MIN: CPT | Performed by: FAMILY MEDICINE

## 2020-06-19 RX ORDER — LAMOTRIGINE 150 MG/1
150 TABLET ORAL 2 TIMES DAILY
Qty: 180 TABLET | Refills: 2 | Status: SHIPPED | OUTPATIENT
Start: 2020-06-19 | End: 2021-04-05 | Stop reason: SDUPTHER

## 2020-06-19 RX ORDER — CITALOPRAM 40 MG/1
40 TABLET ORAL DAILY
Qty: 90 TABLET | Refills: 2 | Status: SHIPPED | OUTPATIENT
Start: 2020-06-19 | End: 2021-04-26 | Stop reason: SDUPTHER

## 2020-06-19 RX ORDER — DIAZEPAM 5 MG/1
5 TABLET ORAL
Qty: 30 TABLET | Refills: 2 | Status: SHIPPED | OUTPATIENT
Start: 2020-06-19 | End: 2020-09-18 | Stop reason: SDUPTHER

## 2020-06-19 RX ORDER — POLYMYXIN B SULFATE AND TRIMETHOPRIM 1; 10000 MG/ML; [USP'U]/ML
1 SOLUTION OPHTHALMIC EVERY 4 HOURS
Qty: 10 ML | Refills: 0 | OUTPATIENT
Start: 2020-06-19 | End: 2020-09-03

## 2020-06-19 RX ORDER — MEGESTROL ACETATE 40 MG/ML
40 SUSPENSION ORAL DAILY
Qty: 240 ML | Refills: 3 | Status: SHIPPED | OUTPATIENT
Start: 2020-06-19 | End: 2020-09-25

## 2020-06-19 NOTE — PROGRESS NOTES
Subjective   Teodora Whitman is a 50 y.o. female.   Cc: follow up of chronic medical issues    Hypertension   This is a chronic problem. The current episode started more than 1 year ago. The problem is unchanged. The problem is controlled. Associated symptoms include anxiety and sweats. Pertinent negatives include no blurred vision, malaise/fatigue, orthopnea, peripheral edema or shortness of breath. Current antihypertension treatment includes beta blockers and diuretics.   Heartburn   She reports no abdominal pain, no belching, no coughing, no dysphagia, no nausea, no sore throat or no wheezing.   Her developmental delay is at it's base line.  She has been having some issues with allergic rhinitis. Her eyes have been itching and are matted together at times.  The following portions of the patient's history were reviewed and updated as appropriate: allergies, current medications, past family history, past medical history, past social history, past surgical history and problem list.    Review of Systems   Constitutional: Negative for malaise/fatigue.   HENT: Negative for sore throat.    Eyes: Negative for blurred vision.   Respiratory: Negative for cough, shortness of breath and wheezing.    Cardiovascular: Negative for orthopnea.   Gastrointestinal: Negative for abdominal pain, dysphagia and nausea.       Objective   Physical Exam   Constitutional: She is oriented to person, place, and time. She appears well-developed and well-nourished.   HENT:   Head: Normocephalic and atraumatic.   Right Ear: External ear normal.   Left Ear: External ear normal.   Nose: Nose normal.   Mouth/Throat: Oropharynx is clear and moist.   Eyes: Pupils are equal, round, and reactive to light. EOM are normal.   Neck: Normal range of motion.   Cardiovascular: Normal rate, regular rhythm and normal heart sounds. Exam reveals no gallop and no friction rub.   No murmur heard.  Pulmonary/Chest: Effort normal and breath sounds normal. No  "stridor. No respiratory distress. She has no wheezes. She has no rales.   Abdominal: Soft. Bowel sounds are normal. She exhibits no distension and no mass. There is no tenderness. There is no guarding.   Neurological: She is alert and oriented to person, place, and time.   Skin: Skin is warm and dry.   Vitals reviewed.        Visit Vitals  /86   Pulse 111   Ht 170.2 cm (67\")   Wt 95.3 kg (210 lb 1.6 oz)   SpO2 97%   BMI 32.91 kg/m²     Body mass index is 32.91 kg/m².      Assessment/Plan   Teodora was seen today for follow-up.    Diagnoses and all orders for this visit:    Essential hypertension    Gastroesophageal reflux disease without esophagitis    Developmental delay, profound    Unspecified intellectual disabilities  -     diazePAM (VALIUM) 5 MG tablet; Take 1 tablet by mouth every night at bedtime.    Allergic rhinitis, unspecified seasonality, unspecified trigger    Other orders  -     lamoTRIgine (LaMICtal) 150 MG tablet; Take 1 tablet by mouth 2 (Two) Times a Day.  -     citalopram (CeleXA) 40 MG tablet; Take 1 tablet by mouth Daily.  -     megestrol (MEGACE) 40 MG/ML suspension; Take 1 mL by mouth Daily. 1 teaaspoon  -     trimethoprim-polymyxin b (Polytrim) 91369-4.1 UNIT/ML-% ophthalmic solution; Administer 1 drop to both eyes Every 4 (Four) Hours.    Return to the clinic in 3 month/s.  Will contact with results as needed.  ALE is appropriate-[id number]    "

## 2020-06-23 ENCOUNTER — LAB (OUTPATIENT)
Dept: LAB | Facility: HOSPITAL | Age: 50
End: 2020-06-23

## 2020-06-23 DIAGNOSIS — I10 ESSENTIAL HYPERTENSION: ICD-10-CM

## 2020-06-23 LAB
ALBUMIN SERPL-MCNC: 4.4 G/DL (ref 3.5–5.2)
ALBUMIN/GLOB SERPL: 1.6 G/DL
ALP SERPL-CCNC: 65 U/L (ref 39–117)
ALT SERPL W P-5'-P-CCNC: 19 U/L (ref 1–33)
ANION GAP SERPL CALCULATED.3IONS-SCNC: 13.1 MMOL/L (ref 5–15)
AST SERPL-CCNC: 17 U/L (ref 1–32)
BASOPHILS # BLD AUTO: 0.1 10*3/MM3 (ref 0–0.2)
BASOPHILS NFR BLD AUTO: 1.1 % (ref 0–1.5)
BILIRUB SERPL-MCNC: 0.4 MG/DL (ref 0.2–1.2)
BUN BLD-MCNC: 15 MG/DL (ref 6–20)
BUN/CREAT SERPL: 10.6 (ref 7–25)
CALCIUM SPEC-SCNC: 9.4 MG/DL (ref 8.6–10.5)
CHLORIDE SERPL-SCNC: 99 MMOL/L (ref 98–107)
CO2 SERPL-SCNC: 26.9 MMOL/L (ref 22–29)
CREAT BLD-MCNC: 1.41 MG/DL (ref 0.57–1)
DEPRECATED RDW RBC AUTO: 45 FL (ref 37–54)
EOSINOPHIL # BLD AUTO: 0.09 10*3/MM3 (ref 0–0.4)
EOSINOPHIL NFR BLD AUTO: 1 % (ref 0.3–6.2)
ERYTHROCYTE [DISTWIDTH] IN BLOOD BY AUTOMATED COUNT: 14.1 % (ref 12.3–15.4)
GFR SERPL CREATININE-BSD FRML MDRD: 40 ML/MIN/1.73
GLOBULIN UR ELPH-MCNC: 2.8 GM/DL
GLUCOSE BLD-MCNC: 82 MG/DL (ref 65–99)
HCT VFR BLD AUTO: 39.7 % (ref 34–46.6)
HGB BLD-MCNC: 13.6 G/DL (ref 12–15.9)
IMM GRANULOCYTES # BLD AUTO: 0.31 10*3/MM3 (ref 0–0.05)
IMM GRANULOCYTES NFR BLD AUTO: 3.4 % (ref 0–0.5)
LYMPHOCYTES # BLD AUTO: 1.15 10*3/MM3 (ref 0.7–3.1)
LYMPHOCYTES NFR BLD AUTO: 12.5 % (ref 19.6–45.3)
MCH RBC QN AUTO: 30.4 PG (ref 26.6–33)
MCHC RBC AUTO-ENTMCNC: 34.3 G/DL (ref 31.5–35.7)
MCV RBC AUTO: 88.8 FL (ref 79–97)
MONOCYTES # BLD AUTO: 0.9 10*3/MM3 (ref 0.1–0.9)
MONOCYTES NFR BLD AUTO: 9.8 % (ref 5–12)
NEUTROPHILS # BLD AUTO: 6.65 10*3/MM3 (ref 1.7–7)
NEUTROPHILS NFR BLD AUTO: 72.2 % (ref 42.7–76)
NRBC BLD AUTO-RTO: 0 /100 WBC (ref 0–0.2)
PLATELET # BLD AUTO: 393 10*3/MM3 (ref 140–450)
PMV BLD AUTO: 9.1 FL (ref 6–12)
POTASSIUM BLD-SCNC: 4.1 MMOL/L (ref 3.5–5.2)
PROT SERPL-MCNC: 7.2 G/DL (ref 6–8.5)
RBC # BLD AUTO: 4.47 10*6/MM3 (ref 3.77–5.28)
SODIUM BLD-SCNC: 139 MMOL/L (ref 136–145)
WBC NRBC COR # BLD: 9.2 10*3/MM3 (ref 3.4–10.8)

## 2020-06-23 PROCEDURE — 85025 COMPLETE CBC W/AUTO DIFF WBC: CPT

## 2020-06-23 PROCEDURE — 36415 COLL VENOUS BLD VENIPUNCTURE: CPT

## 2020-06-23 PROCEDURE — 80053 COMPREHEN METABOLIC PANEL: CPT

## 2020-09-03 PROCEDURE — U0002 COVID-19 LAB TEST NON-CDC: HCPCS | Performed by: NURSE PRACTITIONER

## 2020-09-18 ENCOUNTER — OFFICE VISIT (OUTPATIENT)
Dept: FAMILY MEDICINE CLINIC | Facility: CLINIC | Age: 50
End: 2020-09-18

## 2020-09-18 VITALS
BODY MASS INDEX: 33.59 KG/M2 | OXYGEN SATURATION: 93 % | HEIGHT: 67 IN | SYSTOLIC BLOOD PRESSURE: 146 MMHG | WEIGHT: 214 LBS | HEART RATE: 114 BPM | DIASTOLIC BLOOD PRESSURE: 80 MMHG

## 2020-09-18 DIAGNOSIS — I10 ESSENTIAL HYPERTENSION: Primary | ICD-10-CM

## 2020-09-18 DIAGNOSIS — F79 INTELLECTUAL DISABILITY: ICD-10-CM

## 2020-09-18 DIAGNOSIS — F41.9 ANXIETY: ICD-10-CM

## 2020-09-18 PROBLEM — Z80.0 FAMILY HISTORY OF COLORECTAL CANCER: Status: ACTIVE | Noted: 2019-07-11

## 2020-09-18 PROCEDURE — 99214 OFFICE O/P EST MOD 30 MIN: CPT | Performed by: FAMILY MEDICINE

## 2020-09-18 RX ORDER — DIAZEPAM 5 MG/1
5 TABLET ORAL
Qty: 30 TABLET | Refills: 2 | Status: SHIPPED | OUTPATIENT
Start: 2020-09-18 | End: 2020-12-15

## 2020-09-18 RX ORDER — METOPROLOL SUCCINATE 25 MG/1
25 TABLET, EXTENDED RELEASE ORAL DAILY
Qty: 90 TABLET | Refills: 1 | Status: SHIPPED | OUTPATIENT
Start: 2020-09-18 | End: 2021-04-01 | Stop reason: SDUPTHER

## 2020-09-18 NOTE — PROGRESS NOTES
Subjective   Teodora Whitman is a 50 y.o. female.   Cc: follow up of chronic medical issues    Hypertension  This is a chronic problem. The current episode started more than 1 year ago. The problem has been gradually improving since onset. Associated symptoms include headaches and malaise/fatigue. Pertinent negatives include no anxiety, blurred vision, chest pain, orthopnea, palpitations, peripheral edema, shortness of breath or sweats. Current antihypertension treatment includes beta blockers.   Anxiety  Presents for follow-up visit. Symptoms include decreased concentration, insomnia and restlessness. Patient reports no chest pain, confusion, depressed mood, dizziness, hyperventilation, irritability, palpitations or shortness of breath.         Mentally she is at her base line.  The following portions of the patient's history were reviewed and updated as appropriate: allergies, current medications, past family history, past medical history, past social history, past surgical history and problem list.    Review of Systems   Constitutional: Positive for malaise/fatigue. Negative for irritability.   Eyes: Negative for blurred vision.   Respiratory: Negative for shortness of breath.    Cardiovascular: Negative for chest pain, palpitations and orthopnea.   Neurological: Positive for headaches. Negative for dizziness.   Psychiatric/Behavioral: Positive for decreased concentration. Negative for confusion. The patient has insomnia.        Objective   Physical Exam  Vitals signs reviewed.   Constitutional:       Appearance: Normal appearance.   HENT:      Head: Normocephalic and atraumatic.      Right Ear: Tympanic membrane and external ear normal.      Left Ear: Tympanic membrane and external ear normal.      Nose: Nose normal.      Mouth/Throat:      Mouth: Mucous membranes are moist.      Pharynx: Oropharynx is clear.   Eyes:      Pupils: Pupils are equal, round, and reactive to light.   Neck:      Musculoskeletal:  "Normal range of motion and neck supple.   Cardiovascular:      Rate and Rhythm: Normal rate and regular rhythm.      Heart sounds: Normal heart sounds. No murmur. No friction rub. No gallop.    Pulmonary:      Effort: Pulmonary effort is normal. No respiratory distress.      Breath sounds: Normal breath sounds. No stridor. No wheezing, rhonchi or rales.   Chest:      Chest wall: No tenderness.   Abdominal:      General: Abdomen is flat. Bowel sounds are normal. There is no distension.      Palpations: Abdomen is soft. There is no mass.      Tenderness: There is no abdominal tenderness. There is no guarding or rebound.      Hernia: No hernia is present.   Skin:     General: Skin is warm and dry.   Neurological:      Mental Status: She is alert.           Visit Vitals  /80   Pulse 114   Ht 170.2 cm (67\")   Wt 97.1 kg (214 lb)   LMP  (LMP Unknown)   SpO2 93%   Breastfeeding No   BMI 33.52 kg/m²     Body mass index is 33.52 kg/m².      Assessment/Plan   Teodora was seen today for follow-up, hypertension and med refill.    Diagnoses and all orders for this visit:    Essential hypertension  -     Comprehensive metabolic panel; Future  -     CBC w AUTO Differential; Future  -     Lipid panel; Future    Unspecified intellectual disabilities  -     diazePAM (VALIUM) 5 MG tablet; Take 1 tablet by mouth every night at bedtime.    Anxiety    Other orders  -     metoprolol succinate XL (TOPROL-XL) 25 MG 24 hr tablet; Take 1 tablet by mouth Daily.    Return to the clinic in 3 month/s.  Will contact with results as needed.    "

## 2020-09-25 RX ORDER — MEGESTROL ACETATE 40 MG/ML
SUSPENSION ORAL
Qty: 240 ML | Refills: 0 | Status: SHIPPED | OUTPATIENT
Start: 2020-09-25 | End: 2020-10-13

## 2020-09-28 ENCOUNTER — LAB (OUTPATIENT)
Dept: LAB | Facility: HOSPITAL | Age: 50
End: 2020-09-28

## 2020-09-28 DIAGNOSIS — I10 ESSENTIAL HYPERTENSION: ICD-10-CM

## 2020-09-28 PROCEDURE — 80053 COMPREHEN METABOLIC PANEL: CPT

## 2020-09-28 PROCEDURE — 36415 COLL VENOUS BLD VENIPUNCTURE: CPT

## 2020-09-28 PROCEDURE — 85025 COMPLETE CBC W/AUTO DIFF WBC: CPT

## 2020-09-28 PROCEDURE — 80061 LIPID PANEL: CPT

## 2020-09-29 LAB
ALBUMIN SERPL-MCNC: 4.5 G/DL (ref 3.5–5.2)
ALBUMIN/GLOB SERPL: 1.9 G/DL
ALP SERPL-CCNC: 65 U/L (ref 39–117)
ALT SERPL W P-5'-P-CCNC: 24 U/L (ref 1–33)
ANION GAP SERPL CALCULATED.3IONS-SCNC: 12.3 MMOL/L (ref 5–15)
AST SERPL-CCNC: 16 U/L (ref 1–32)
BASOPHILS # BLD AUTO: 0.09 10*3/MM3 (ref 0–0.2)
BASOPHILS NFR BLD AUTO: 1.1 % (ref 0–1.5)
BILIRUB SERPL-MCNC: 0.4 MG/DL (ref 0–1.2)
BUN SERPL-MCNC: 18 MG/DL (ref 6–20)
BUN/CREAT SERPL: 13 (ref 7–25)
CALCIUM SPEC-SCNC: 9.4 MG/DL (ref 8.6–10.5)
CHLORIDE SERPL-SCNC: 97 MMOL/L (ref 98–107)
CHOLEST SERPL-MCNC: 261 MG/DL (ref 0–200)
CO2 SERPL-SCNC: 28.7 MMOL/L (ref 22–29)
CREAT SERPL-MCNC: 1.38 MG/DL (ref 0.57–1)
DEPRECATED RDW RBC AUTO: 45.5 FL (ref 37–54)
EOSINOPHIL # BLD AUTO: 0.12 10*3/MM3 (ref 0–0.4)
EOSINOPHIL NFR BLD AUTO: 1.5 % (ref 0.3–6.2)
ERYTHROCYTE [DISTWIDTH] IN BLOOD BY AUTOMATED COUNT: 13.8 % (ref 12.3–15.4)
GFR SERPL CREATININE-BSD FRML MDRD: 41 ML/MIN/1.73
GLOBULIN UR ELPH-MCNC: 2.4 GM/DL
GLUCOSE SERPL-MCNC: 84 MG/DL (ref 65–99)
HCT VFR BLD AUTO: 38.4 % (ref 34–46.6)
HDLC SERPL-MCNC: 43 MG/DL (ref 40–60)
HGB BLD-MCNC: 13.2 G/DL (ref 12–15.9)
IMM GRANULOCYTES # BLD AUTO: 0.28 10*3/MM3 (ref 0–0.05)
IMM GRANULOCYTES NFR BLD AUTO: 3.4 % (ref 0–0.5)
LDLC SERPL CALC-MCNC: 189 MG/DL (ref 0–100)
LDLC/HDLC SERPL: 4.4 {RATIO}
LYMPHOCYTES # BLD AUTO: 1.25 10*3/MM3 (ref 0.7–3.1)
LYMPHOCYTES NFR BLD AUTO: 15.2 % (ref 19.6–45.3)
MCH RBC QN AUTO: 31.1 PG (ref 26.6–33)
MCHC RBC AUTO-ENTMCNC: 34.4 G/DL (ref 31.5–35.7)
MCV RBC AUTO: 90.4 FL (ref 79–97)
MONOCYTES # BLD AUTO: 0.8 10*3/MM3 (ref 0.1–0.9)
MONOCYTES NFR BLD AUTO: 9.8 % (ref 5–12)
NEUTROPHILS NFR BLD AUTO: 5.66 10*3/MM3 (ref 1.7–7)
NEUTROPHILS NFR BLD AUTO: 69 % (ref 42.7–76)
NRBC BLD AUTO-RTO: 0 /100 WBC (ref 0–0.2)
PLATELET # BLD AUTO: 379 10*3/MM3 (ref 140–450)
PMV BLD AUTO: 8.9 FL (ref 6–12)
POTASSIUM SERPL-SCNC: 4.1 MMOL/L (ref 3.5–5.2)
PROT SERPL-MCNC: 6.9 G/DL (ref 6–8.5)
RBC # BLD AUTO: 4.25 10*6/MM3 (ref 3.77–5.28)
SODIUM SERPL-SCNC: 138 MMOL/L (ref 136–145)
TRIGL SERPL-MCNC: 145 MG/DL (ref 0–150)
VLDLC SERPL-MCNC: 29 MG/DL
WBC # BLD AUTO: 8.2 10*3/MM3 (ref 3.4–10.8)

## 2020-10-13 RX ORDER — MEGESTROL ACETATE 40 MG/ML
SUSPENSION ORAL
Qty: 240 ML | Refills: 0 | Status: SHIPPED | OUTPATIENT
Start: 2020-10-13 | End: 2021-02-01 | Stop reason: SDUPTHER

## 2020-10-13 NOTE — TELEPHONE ENCOUNTER
Needing a copy of Teodora's last office note faxed to her  Merlene Rhonda @ [phone].They are trying to have Teodora placed in a group home and need this paperwork. Please call if you have any questions

## 2020-10-15 ENCOUNTER — TELEPHONE (OUTPATIENT)
Dept: FAMILY MEDICINE CLINIC | Facility: CLINIC | Age: 50
End: 2020-10-15

## 2020-10-15 NOTE — TELEPHONE ENCOUNTER
Maria Alejandra Whitman called requesting a copy of the medications that Teodora takes for the .     If you could, call and let her know when it is ready to be picked up [phone]    Thank you

## 2020-11-13 ENCOUNTER — TELEPHONE (OUTPATIENT)
Dept: FAMILY MEDICINE CLINIC | Facility: CLINIC | Age: 50
End: 2020-11-13

## 2020-11-13 NOTE — TELEPHONE ENCOUNTER
Mrs. Maria Alejandra Whitman is needing a statement or written prescription stating that Teodora can take over the counter Mucinex for her dizziness due to sinus pressure and also that she can take over the counter Extra Strength Tylenol for her headaches as needed. Please contact Ms. Maria Alejandra Whitman when this is ready. Thank you.

## 2020-12-02 ENCOUNTER — TRANSCRIBE ORDERS (OUTPATIENT)
Dept: LAB | Facility: HOSPITAL | Age: 50
End: 2020-12-02

## 2020-12-02 DIAGNOSIS — N18.30 STAGE 3 CHRONIC KIDNEY DISEASE, UNSPECIFIED WHETHER STAGE 3A OR 3B CKD (HCC): ICD-10-CM

## 2020-12-02 DIAGNOSIS — K21.9 GASTROESOPHAGEAL REFLUX DISEASE, UNSPECIFIED WHETHER ESOPHAGITIS PRESENT: ICD-10-CM

## 2020-12-02 DIAGNOSIS — F79 INTELLECTUAL FUNCTIONING DISABILITY: ICD-10-CM

## 2020-12-02 DIAGNOSIS — D50.9 IRON DEFICIENCY ANEMIA, UNSPECIFIED IRON DEFICIENCY ANEMIA TYPE: ICD-10-CM

## 2020-12-02 DIAGNOSIS — C81.90 HODGKIN LYMPHOMA, UNSPECIFIED HODGKIN LYMPHOMA TYPE, UNSPECIFIED BODY REGION (HCC): Primary | ICD-10-CM

## 2020-12-03 ENCOUNTER — LAB (OUTPATIENT)
Dept: LAB | Facility: HOSPITAL | Age: 50
End: 2020-12-03

## 2020-12-03 LAB
25(OH)D3 SERPL-MCNC: 33.3 NG/ML (ref 30–100)
ALBUMIN SERPL-MCNC: 4.1 G/DL (ref 3.5–5.2)
ALBUMIN/GLOB SERPL: 1.1 G/DL
ALP SERPL-CCNC: 76 U/L (ref 39–117)
ALT SERPL W P-5'-P-CCNC: 21 U/L (ref 1–33)
ANION GAP SERPL CALCULATED.3IONS-SCNC: 15.7 MMOL/L (ref 5–15)
AST SERPL-CCNC: 18 U/L (ref 1–32)
BILIRUB SERPL-MCNC: 0.3 MG/DL (ref 0–1.2)
BUN SERPL-MCNC: 17 MG/DL (ref 6–20)
BUN/CREAT SERPL: 11.6 (ref 7–25)
CALCIUM SPEC-SCNC: 10.1 MG/DL (ref 8.6–10.5)
CHLORIDE SERPL-SCNC: 98 MMOL/L (ref 98–107)
CHOLEST SERPL-MCNC: 215 MG/DL (ref 0–200)
CO2 SERPL-SCNC: 25.3 MMOL/L (ref 22–29)
CREAT SERPL-MCNC: 1.46 MG/DL (ref 0.57–1)
GFR SERPL CREATININE-BSD FRML MDRD: 38 ML/MIN/1.73
GLOBULIN UR ELPH-MCNC: 3.8 GM/DL
GLUCOSE SERPL-MCNC: 90 MG/DL (ref 65–99)
HCT VFR BLD AUTO: 38.1 % (ref 34–46.6)
HDLC SERPL-MCNC: 28 MG/DL (ref 40–60)
HGB BLD-MCNC: 13 G/DL (ref 12–15.9)
LDLC SERPL CALC-MCNC: 155 MG/DL (ref 0–100)
LDLC/HDLC SERPL: 5.45 {RATIO}
MAGNESIUM SERPL-MCNC: 2.4 MG/DL (ref 1.6–2.6)
POTASSIUM SERPL-SCNC: 3.8 MMOL/L (ref 3.5–5.2)
PROT SERPL-MCNC: 7.9 G/DL (ref 6–8.5)
SODIUM SERPL-SCNC: 139 MMOL/L (ref 136–145)
TRIGL SERPL-MCNC: 172 MG/DL (ref 0–150)
VIT B12 BLD-MCNC: 290 PG/ML (ref 211–946)
VLDLC SERPL-MCNC: 32 MG/DL (ref 5–40)

## 2020-12-03 PROCEDURE — 80061 LIPID PANEL: CPT | Performed by: INTERNAL MEDICINE

## 2020-12-03 PROCEDURE — 83735 ASSAY OF MAGNESIUM: CPT | Performed by: INTERNAL MEDICINE

## 2020-12-03 PROCEDURE — 85014 HEMATOCRIT: CPT | Performed by: INTERNAL MEDICINE

## 2020-12-03 PROCEDURE — 82607 VITAMIN B-12: CPT | Performed by: INTERNAL MEDICINE

## 2020-12-03 PROCEDURE — 82306 VITAMIN D 25 HYDROXY: CPT | Performed by: INTERNAL MEDICINE

## 2020-12-03 PROCEDURE — 85018 HEMOGLOBIN: CPT | Performed by: INTERNAL MEDICINE

## 2020-12-03 PROCEDURE — 36415 COLL VENOUS BLD VENIPUNCTURE: CPT | Performed by: INTERNAL MEDICINE

## 2020-12-03 PROCEDURE — 80053 COMPREHEN METABOLIC PANEL: CPT | Performed by: INTERNAL MEDICINE

## 2020-12-15 DIAGNOSIS — F79 INTELLECTUAL DISABILITY: ICD-10-CM

## 2020-12-15 RX ORDER — DIAZEPAM 5 MG/1
TABLET ORAL
Qty: 30 TABLET | Refills: 0 | Status: SHIPPED | OUTPATIENT
Start: 2020-12-15 | End: 2021-01-12 | Stop reason: SDUPTHER

## 2020-12-15 NOTE — TELEPHONE ENCOUNTER
Request refill on her Valium 5 mg  #30    Last OV    09/18/2020    Next Eulogio OV    12/18/2020    Last Script Written  09/18/2020  #30, 2 refills      Last Bhavin    03/19/2020, Review PDMP    Please advise on refill    Thank you

## 2020-12-18 ENCOUNTER — OFFICE VISIT (OUTPATIENT)
Dept: FAMILY MEDICINE CLINIC | Facility: CLINIC | Age: 50
End: 2020-12-18

## 2020-12-18 VITALS
OXYGEN SATURATION: 98 % | HEIGHT: 67 IN | BODY MASS INDEX: 31.71 KG/M2 | DIASTOLIC BLOOD PRESSURE: 80 MMHG | SYSTOLIC BLOOD PRESSURE: 138 MMHG | HEART RATE: 114 BPM | WEIGHT: 202.06 LBS

## 2020-12-18 DIAGNOSIS — F79 INTELLECTUAL DISABILITY: ICD-10-CM

## 2020-12-18 DIAGNOSIS — Z00.00 WELLNESS EXAMINATION: Primary | ICD-10-CM

## 2020-12-18 DIAGNOSIS — I10 ESSENTIAL HYPERTENSION: ICD-10-CM

## 2020-12-18 PROCEDURE — 99214 OFFICE O/P EST MOD 30 MIN: CPT | Performed by: FAMILY MEDICINE

## 2020-12-18 PROCEDURE — G0439 PPPS, SUBSEQ VISIT: HCPCS | Performed by: FAMILY MEDICINE

## 2020-12-18 RX ORDER — ROSUVASTATIN CALCIUM 10 MG/1
10 TABLET, COATED ORAL DAILY
COMMUNITY
Start: 2020-12-09 | End: 2021-09-21 | Stop reason: SDUPTHER

## 2020-12-18 RX ORDER — LANOLIN ALCOHOL/MO/W.PET/CERES
1000 CREAM (GRAM) TOPICAL DAILY
Qty: 30 TABLET | Refills: 2 | Status: SHIPPED | OUTPATIENT
Start: 2020-12-18 | End: 2021-03-19 | Stop reason: SDUPTHER

## 2020-12-18 NOTE — PROGRESS NOTES
The ABCs of the Annual Wellness Visit  Subsequent Medicare Wellness Visit  Cc: follow up of chronic medical issues    Chief Complaint   Patient presents with   • Medicare Wellness-subsequent   • Follow-up       Subjective   History of Present Illness:  Teodora Whitman is a 50 y.o. female who presents for a Subsequent Medicare Wellness Visit.    HEALTH RISK ASSESSMENT    Recent Hospitalizations:  No hospitalization(s) within the last year.    Current Medical Providers:  Patient Care Team:  Gilbert Hamilton MD as PCP - General (Family Medicine)  Bam Casanova MD as Consulting Physician (Hematology and Oncology)    Smoking Status:  Social History     Tobacco Use   Smoking Status Never Smoker   Smokeless Tobacco Never Used       Alcohol Consumption:  Social History     Substance and Sexual Activity   Alcohol Use No       Depression Screen:   PHQ-2/PHQ-9 Depression Screening 12/18/2020   Little interest or pleasure in doing things 1   Feeling down, depressed, or hopeless 3   Trouble falling or staying asleep, or sleeping too much 3   Feeling tired or having little energy 3   Poor appetite or overeating 3   Feeling bad about yourself - or that you are a failure or have let yourself or your family down 0   Trouble concentrating on things, such as reading the newspaper or watching television 0   Moving or speaking so slowly that other people could have noticed. Or the opposite - being so fidgety or restless that you have been moving around a lot more than usual 0   Thoughts that you would be better off dead, or of hurting yourself in some way 0   Total Score 13   If you checked off any problems, how difficult have these problems made it for you to do your work, take care of things at home, or get along with other people? Very difficult       Fall Risk Screen:  STEADI Fall Risk Assessment has not been completed.    Health Habits and Functional and Cognitive Screening:  Functional & Cognitive Status 12/18/2020   Do  you have difficulty preparing food and eating? Yes   Do you have difficulty bathing yourself, getting dressed or grooming yourself? Yes   Do you have difficulty using the toilet? Yes   Do you have difficulty moving around from place to place? Yes   Do you have trouble with steps or getting out of a bed or a chair? Yes   Current Diet Well Balanced Diet   Dental Exam Up to date        Dental Exam Comment -   Eye Exam Not up to date   Exercise (times per week) 0 times per week   Current Exercise Activities Include None   Do you need help using the phone?  Yes   Are you deaf or do you have serious difficulty hearing?  No   Do you need help with transportation? Yes   Do you need help shopping? Yes   Do you need help preparing meals?  Yes   Do you need help with housework?  Yes   Do you need help with laundry? Yes   Do you need help taking your medications? Yes   Do you need help managing money? Yes   Do you ever drive or ride in a car without wearing a seat belt? No   Have you felt unusual stress, anger or loneliness in the last month? Yes   Who do you live with? Community   If you need help, do you have trouble finding someone available to you? No   Have you been bothered in the last four weeks by sexual problems? No   Do you have difficulty concentrating, remembering or making decisions? Yes         Does the patient have evidence of cognitive impairment? No    Asprin use counseling:Does not need ASA (and currently is not on it)    Age-appropriate Screening Schedule:  Refer to the list below for future screening recommendations based on patient's age, sex and/or medical conditions. Orders for these recommended tests are listed in the plan section. The patient has been provided with a written plan.    Health Maintenance   Topic Date Due   • TDAP/TD VACCINES (2 - Tdap) 05/12/1990   • PAP SMEAR  08/10/2020   • COLONOSCOPY  10/13/2025   • INFLUENZA VACCINE  Completed          The following portions of the patient's history  were reviewed and updated as appropriate: allergies, current medications, past family history, past medical history, past social history, past surgical history and problem list.    Outpatient Medications Prior to Visit   Medication Sig Dispense Refill   • citalopram (CeleXA) 40 MG tablet Take 1 tablet by mouth Daily. 90 tablet 2   • diazePAM (VALIUM) 5 MG tablet TAKE 1 TABLET BY MOUTH EVERY DAY AT BEDTIME 30 tablet 0   • famotidine (PEPCID) 20 MG tablet Take 1 tablet by mouth 2 (Two) Times a Day. 180 tablet 3   • hydroCHLOROthiazide (MICROZIDE) 12.5 MG capsule Take 1 capsule by mouth Daily. 90 capsule 2   • lamoTRIgine (LaMICtal) 150 MG tablet Take 1 tablet by mouth 2 (Two) Times a Day. 180 tablet 2   • loratadine (CLARITIN) 10 MG tablet Take 1 tablet by mouth Daily. 30 tablet 0   • megestrol (MEGACE) 40 MG/ML suspension TAKE 5 ML BY MOUTH  ONCE DAILY 240 mL 0   • metoprolol succinate XL (TOPROL-XL) 25 MG 24 hr tablet Take 1 tablet by mouth Daily. 90 tablet 1   • rosuvastatin (CRESTOR) 10 MG tablet Take 10 mg by mouth Daily.     • Triamcinolone Acetonide (Nasacort Allergy 24HR) 55 MCG/ACT nasal inhaler 2 sprays into the nostril(s) as directed by provider Daily. 16.5 g 0   • vitamin D (ERGOCALCIFEROL) 1.25 MG (62150 UT) capsule capsule Take 1 capsule by mouth Every 14 (Fourteen) Days. 8 capsule 2     No facility-administered medications prior to visit.        Patient Active Problem List   Diagnosis   • Aphasia   • Epilepsy (CMS/HCC)   • Gastroesophageal reflux disease without esophagitis   • Iron deficiency anemia   • Obsessive-compulsive disorder   • History of Hodgkin's disease   • Unspecified intellectual disabilities   • CKD (chronic kidney disease)   • Essential hypertension   • Chest pain   • Closed fracture of left distal radius   • Developmental delay, profound   • Abnormal chest x-ray   • Tachycardia   • Closed fracture of lower end of left ulna with routine healing   • Anemia   • Turpin's esophagus   •  "Family history of colorectal cancer   • Hiatal hernia   • History of adenomatous polyp of colon       Advanced Care Planning:  ACP discussion was held with the patient during this visit. Patient does not have an advance directive, information provided.    Review of Systems   Constitutional: Negative for fatigue and fever.   Respiratory: Negative for cough, chest tightness and wheezing.    Cardiovascular: Negative for chest pain and leg swelling.   Gastrointestinal: Negative for abdominal pain and blood in stool.   Musculoskeletal: Negative for arthralgias and back pain.       Compared to one year ago, the patient feels her physical health is the same.  Compared to one year ago, the patient feels her mental health is worse.    Reviewed chart for potential of high risk medication in the elderly: yes  Reviewed chart for potential of harmful drug interactions in the elderly:yes    Objective         Vitals:    12/18/20 1518   BP: 138/80   Pulse: 114   SpO2: 98%   Weight: 91.7 kg (202 lb 1 oz)   Height: 170.2 cm (67\")       Body mass index is 31.65 kg/m².  Discussed the patient's BMI with her. The BMI is above average; no BMI management plan is appropriate..    Physical Exam  Vitals signs reviewed.   Constitutional:       Appearance: Normal appearance.   HENT:      Head: Normocephalic and atraumatic.      Right Ear: Tympanic membrane, ear canal and external ear normal.      Left Ear: Tympanic membrane, ear canal and external ear normal.      Nose: Nose normal.      Mouth/Throat:      Mouth: Mucous membranes are moist.      Pharynx: Oropharynx is clear.   Eyes:      Extraocular Movements: Extraocular movements intact.      Pupils: Pupils are equal, round, and reactive to light.   Neck:      Musculoskeletal: Normal range of motion.   Cardiovascular:      Rate and Rhythm: Normal rate and regular rhythm.      Heart sounds: Normal heart sounds. No murmur. No friction rub. No gallop.    Pulmonary:      Effort: Pulmonary effort " is normal. No respiratory distress.      Breath sounds: Normal breath sounds. No stridor. No wheezing, rhonchi or rales.   Chest:      Chest wall: No tenderness.   Abdominal:      General: Abdomen is flat. Bowel sounds are normal. There is no distension.      Palpations: Abdomen is soft.      Tenderness: There is no abdominal tenderness.   Skin:     General: Skin is warm and dry.   Neurological:      Mental Status: She is alert.         Lab Results   Component Value Date    TRIG 172 (H) 12/03/2020    HDL 28 (L) 12/03/2020     (H) 12/03/2020    VLDL 32 12/03/2020        Assessment/Plan   Medicare Risks and Personalized Health Plan  CMS Preventative Services Quick Reference  Advance Directive Discussion  Depression/Dysphoria  Fall Risk    The above risks/problems have been discussed with the patient.  Pertinent information has been shared with the patient in the After Visit Summary.  Follow up plans and orders are seen below in the Assessment/Plan Section.    There are no diagnoses linked to this encounter.  Follow Up:  No follow-ups on file.     An After Visit Summary and PPPS were given to the patient.       Continue on current medications.  Return to the clinic in 3 month/s.  Will contact with results as needed.

## 2021-01-04 DIAGNOSIS — F79 INTELLECTUAL DISABILITY: ICD-10-CM

## 2021-01-04 RX ORDER — DIAZEPAM 5 MG/1
TABLET ORAL
Qty: 30 TABLET | Refills: 0 | OUTPATIENT
Start: 2021-01-04

## 2021-01-04 NOTE — TELEPHONE ENCOUNTER
Requesting a refill on her Valium 5 mg #30  Last Script Written  12/15/2020  #30, NR  Should not need a refill until 01/14/2021 at the earliest.    I have called her mother to let her know that she should have a script on file at the pharmacy

## 2021-01-12 DIAGNOSIS — F79 INTELLECTUAL DISABILITY: ICD-10-CM

## 2021-01-12 RX ORDER — DIAZEPAM 5 MG/1
5 TABLET ORAL
Qty: 30 TABLET | Refills: 2 | Status: SHIPPED | OUTPATIENT
Start: 2021-01-12 | End: 2021-03-10 | Stop reason: SDUPTHER

## 2021-02-01 RX ORDER — MEGESTROL ACETATE 40 MG/ML
SUSPENSION ORAL
Qty: 240 ML | Refills: 0 | Status: SHIPPED | OUTPATIENT
Start: 2021-02-01 | End: 2021-03-30 | Stop reason: SDUPTHER

## 2021-02-01 NOTE — TELEPHONE ENCOUNTER
Caller: LOLY     Relationship: [facility] call back number: [phone]    Medication needed:   Requested Prescriptions     Pending Prescriptions Disp Refills   • megestrol (MEGACE) 40 MG/ML suspension 240 mL 0       When do you need the refill by: 02/01/2021    What details did the patient provide when requesting the medication: ALMOST OUT     Does the patient have less than a 3 day supply:  [x] Yes  [] No    What is the patient's preferred pharmacy: [facility] [address] [place] [address] [phone] [facility] [phone]

## 2021-02-17 RX ORDER — HYDROCHLOROTHIAZIDE 12.5 MG/1
12.5 CAPSULE, GELATIN COATED ORAL DAILY
Qty: 90 CAPSULE | Refills: 2 | Status: SHIPPED | OUTPATIENT
Start: 2021-02-17 | End: 2021-07-09

## 2021-02-17 NOTE — TELEPHONE ENCOUNTER
Caller: LOLY    Relationship:     Best call back number:[phone]    Medication needed:   Requested Prescriptions     Pending Prescriptions Disp Refills   • hydroCHLOROthiazide (MICROZIDE) 12.5 MG capsule 90 capsule 2     Sig: Take 1 capsule by mouth Daily.       When do you need the refill by: TODAY    Does the patient have less than a 3 day supply:  [x] Yes  [] No    What is the patient's preferred pharmacy: [facility] [address] [place] [address] [phone] [facility] [phone]

## 2021-03-10 DIAGNOSIS — F79 INTELLECTUAL DISABILITY: ICD-10-CM

## 2021-03-10 RX ORDER — DIAZEPAM 5 MG/1
5 TABLET ORAL
Qty: 30 TABLET | Refills: 2 | Status: SHIPPED | OUTPATIENT
Start: 2021-03-10 | End: 2021-05-25

## 2021-03-19 ENCOUNTER — OFFICE VISIT (OUTPATIENT)
Dept: FAMILY MEDICINE CLINIC | Facility: CLINIC | Age: 51
End: 2021-03-19

## 2021-03-19 ENCOUNTER — LAB (OUTPATIENT)
Dept: LAB | Facility: HOSPITAL | Age: 51
End: 2021-03-19

## 2021-03-19 VITALS
DIASTOLIC BLOOD PRESSURE: 72 MMHG | SYSTOLIC BLOOD PRESSURE: 140 MMHG | WEIGHT: 190.4 LBS | OXYGEN SATURATION: 98 % | BODY MASS INDEX: 29.88 KG/M2 | HEART RATE: 101 BPM | HEIGHT: 67 IN

## 2021-03-19 DIAGNOSIS — F41.9 ANXIETY: ICD-10-CM

## 2021-03-19 DIAGNOSIS — F79 INTELLECTUAL DISABILITY: ICD-10-CM

## 2021-03-19 DIAGNOSIS — I10 ESSENTIAL HYPERTENSION: ICD-10-CM

## 2021-03-19 DIAGNOSIS — I10 ESSENTIAL HYPERTENSION: Primary | ICD-10-CM

## 2021-03-19 PROCEDURE — 85025 COMPLETE CBC W/AUTO DIFF WBC: CPT

## 2021-03-19 PROCEDURE — 80053 COMPREHEN METABOLIC PANEL: CPT

## 2021-03-19 PROCEDURE — 36415 COLL VENOUS BLD VENIPUNCTURE: CPT

## 2021-03-19 PROCEDURE — 99214 OFFICE O/P EST MOD 30 MIN: CPT | Performed by: FAMILY MEDICINE

## 2021-03-19 PROCEDURE — 80061 LIPID PANEL: CPT

## 2021-03-19 RX ORDER — LANOLIN ALCOHOL/MO/W.PET/CERES
1000 CREAM (GRAM) TOPICAL DAILY
Qty: 30 TABLET | Refills: 2 | Status: SHIPPED | OUTPATIENT
Start: 2021-03-19 | End: 2021-06-21 | Stop reason: SDUPTHER

## 2021-03-19 RX ORDER — CETIRIZINE HYDROCHLORIDE 10 MG/1
10 TABLET ORAL DAILY
Qty: 30 TABLET | Refills: 3 | Status: SHIPPED | OUTPATIENT
Start: 2021-03-19 | End: 2021-06-07

## 2021-03-19 NOTE — PROGRESS NOTES
Subjective   Teodora Whitman is a 50 y.o. female.   Cc: follow up of chronic medical issues    Hypertension  This is a chronic problem. The current episode started more than 1 year ago. The problem has been waxing and waning since onset. Associated symptoms include anxiety. Pertinent negatives include no blurred vision, chest pain, malaise/fatigue, orthopnea, palpitations, peripheral edema or shortness of breath. Current antihypertension treatment includes beta blockers.   Hyperlipidemia  This is a chronic problem. The current episode started more than 1 year ago. Recent lipid tests were reviewed and are variable. Pertinent negatives include no chest pain or shortness of breath. Current antihyperlipidemic treatment includes statins.   Anxiety  Symptoms include insomnia and nervous/anxious behavior. Patient reports no chest pain, confusion, depressed mood, excessive worry, feeling of choking, irritability, palpitations, restlessness or shortness of breath.           The following portions of the patient's history were reviewed and updated as appropriate: allergies, current medications, past family history, past medical history, past social history, past surgical history and problem list.    Review of Systems   Constitutional: Negative for irritability and malaise/fatigue.   Eyes: Negative for blurred vision.   Respiratory: Negative for shortness of breath.    Cardiovascular: Negative for chest pain, palpitations and orthopnea.   Psychiatric/Behavioral: Negative for confusion. The patient is nervous/anxious and has insomnia.        Objective   Physical Exam  Vitals reviewed.   Constitutional:       Appearance: Normal appearance.   HENT:      Head: Normocephalic and atraumatic.      Right Ear: Tympanic membrane, ear canal and external ear normal.      Left Ear: Tympanic membrane, ear canal and external ear normal.      Nose: Nose normal.      Mouth/Throat:      Mouth: Mucous membranes are moist.      Pharynx:  "Oropharynx is clear.   Eyes:      Extraocular Movements: Extraocular movements intact.      Pupils: Pupils are equal, round, and reactive to light.   Cardiovascular:      Rate and Rhythm: Normal rate and regular rhythm.      Heart sounds: Normal heart sounds. No murmur heard.   No friction rub. No gallop.    Pulmonary:      Effort: Pulmonary effort is normal. No respiratory distress.      Breath sounds: Normal breath sounds. No stridor. No wheezing, rhonchi or rales.   Chest:      Chest wall: No tenderness.   Abdominal:      General: Abdomen is flat. Bowel sounds are normal. There is no distension.      Palpations: Abdomen is soft.      Tenderness: There is no abdominal tenderness. There is no guarding.   Musculoskeletal:      Cervical back: Normal range of motion and neck supple.   Skin:     General: Skin is warm and dry.   Neurological:      Mental Status: She is alert.           Visit Vitals  /72   Pulse 101   Ht 170.2 cm (67\")   Wt 86.4 kg (190 lb 6.4 oz)   SpO2 98%   BMI 29.82 kg/m²     Body mass index is 29.82 kg/m².      Assessment/Plan   Diagnoses and all orders for this visit:    1. Essential hypertension (Primary)  -     Comprehensive metabolic panel; Future  -     CBC w AUTO Differential; Future  -     Lipid panel; Future    2. Unspecified intellectual disabilities    3. Anxiety    Other orders  -     Melatonin 5 MG capsule; Take 5 mg by mouth Every Night.  Dispense: 30 each; Refill: 11  -     vitamin B-12 (CYANOCOBALAMIN) 1000 MCG tablet; Take 1 tablet by mouth Daily.  Dispense: 30 tablet; Refill: 2  -     cetirizine (zyrTEC) 10 MG tablet; Take 1 tablet by mouth Daily.  Dispense: 30 tablet; Refill: 3  -     guaiFENesin (Mucinex) 600 MG 12 hr tablet; Take 2 tablets by mouth Daily.  Dispense: 60 tablet; Refill: 3    Return to the clinic in 3 month/s.  Will contact with results as needed.    "

## 2021-03-20 LAB
ALBUMIN SERPL-MCNC: 4.5 G/DL (ref 3.5–5.2)
ALBUMIN/GLOB SERPL: 1.6 G/DL
ALP SERPL-CCNC: 74 U/L (ref 39–117)
ALT SERPL W P-5'-P-CCNC: 11 U/L (ref 1–33)
ANION GAP SERPL CALCULATED.3IONS-SCNC: 11 MMOL/L (ref 5–15)
AST SERPL-CCNC: 10 U/L (ref 1–32)
BASOPHILS # BLD AUTO: 0.07 10*3/MM3 (ref 0–0.2)
BASOPHILS NFR BLD AUTO: 0.8 % (ref 0–1.5)
BILIRUB SERPL-MCNC: 0.3 MG/DL (ref 0–1.2)
BUN SERPL-MCNC: 18 MG/DL (ref 6–20)
BUN/CREAT SERPL: 13.2 (ref 7–25)
CALCIUM SPEC-SCNC: 9.3 MG/DL (ref 8.6–10.5)
CHLORIDE SERPL-SCNC: 101 MMOL/L (ref 98–107)
CHOLEST SERPL-MCNC: 146 MG/DL (ref 0–200)
CO2 SERPL-SCNC: 29 MMOL/L (ref 22–29)
CREAT SERPL-MCNC: 1.36 MG/DL (ref 0.57–1)
DEPRECATED RDW RBC AUTO: 44.5 FL (ref 37–54)
EOSINOPHIL # BLD AUTO: 0.13 10*3/MM3 (ref 0–0.4)
EOSINOPHIL NFR BLD AUTO: 1.4 % (ref 0.3–6.2)
ERYTHROCYTE [DISTWIDTH] IN BLOOD BY AUTOMATED COUNT: 13.9 % (ref 12.3–15.4)
GFR SERPL CREATININE-BSD FRML MDRD: 41 ML/MIN/1.73
GLOBULIN UR ELPH-MCNC: 2.9 GM/DL
GLUCOSE SERPL-MCNC: 88 MG/DL (ref 65–99)
HCT VFR BLD AUTO: 40.6 % (ref 34–46.6)
HDLC SERPL-MCNC: 39 MG/DL (ref 40–60)
HGB BLD-MCNC: 13.3 G/DL (ref 12–15.9)
IMM GRANULOCYTES # BLD AUTO: 0.09 10*3/MM3 (ref 0–0.05)
IMM GRANULOCYTES NFR BLD AUTO: 1 % (ref 0–0.5)
LDLC SERPL CALC-MCNC: 89 MG/DL (ref 0–100)
LDLC/HDLC SERPL: 2.25 {RATIO}
LYMPHOCYTES # BLD AUTO: 1.27 10*3/MM3 (ref 0.7–3.1)
LYMPHOCYTES NFR BLD AUTO: 13.8 % (ref 19.6–45.3)
MCH RBC QN AUTO: 29.4 PG (ref 26.6–33)
MCHC RBC AUTO-ENTMCNC: 32.8 G/DL (ref 31.5–35.7)
MCV RBC AUTO: 89.6 FL (ref 79–97)
MONOCYTES # BLD AUTO: 0.84 10*3/MM3 (ref 0.1–0.9)
MONOCYTES NFR BLD AUTO: 9.1 % (ref 5–12)
NEUTROPHILS NFR BLD AUTO: 6.83 10*3/MM3 (ref 1.7–7)
NEUTROPHILS NFR BLD AUTO: 73.9 % (ref 42.7–76)
NRBC BLD AUTO-RTO: 0 /100 WBC (ref 0–0.2)
PLATELET # BLD AUTO: 393 10*3/MM3 (ref 140–450)
PMV BLD AUTO: 8.9 FL (ref 6–12)
POTASSIUM SERPL-SCNC: 4.1 MMOL/L (ref 3.5–5.2)
PROT SERPL-MCNC: 7.4 G/DL (ref 6–8.5)
RBC # BLD AUTO: 4.53 10*6/MM3 (ref 3.77–5.28)
SODIUM SERPL-SCNC: 141 MMOL/L (ref 136–145)
TRIGL SERPL-MCNC: 97 MG/DL (ref 0–150)
VLDLC SERPL-MCNC: 18 MG/DL (ref 5–40)
WBC # BLD AUTO: 9.23 10*3/MM3 (ref 3.4–10.8)

## 2021-03-30 ENCOUNTER — TELEPHONE (OUTPATIENT)
Dept: FAMILY MEDICINE CLINIC | Facility: CLINIC | Age: 51
End: 2021-03-30

## 2021-03-30 RX ORDER — MEGESTROL ACETATE 40 MG/ML
SUSPENSION ORAL
Qty: 240 ML | Refills: 1 | Status: SHIPPED | OUTPATIENT
Start: 2021-03-30 | End: 2021-05-07

## 2021-03-30 NOTE — TELEPHONE ENCOUNTER
Ms. Whitman is out of her megestrol 40mg oral suspension called to [facility] [address]. Pt is out of this medication.

## 2021-04-01 ENCOUNTER — TELEPHONE (OUTPATIENT)
Dept: FAMILY MEDICINE CLINIC | Facility: CLINIC | Age: 51
End: 2021-04-01

## 2021-04-01 RX ORDER — METOPROLOL SUCCINATE 25 MG/1
25 TABLET, EXTENDED RELEASE ORAL DAILY
Qty: 90 TABLET | Refills: 1 | Status: SHIPPED | OUTPATIENT
Start: 2021-04-01 | End: 2021-08-05

## 2021-04-01 NOTE — TELEPHONE ENCOUNTER
Pt needs refill for    metoprolol succinate XL (TOPROL-XL) 25 MG 24 hr tablet    [facility] [place], [place] - [address] [facility] [phone] [place] [phone] FX

## 2021-04-05 ENCOUNTER — TELEPHONE (OUTPATIENT)
Dept: FAMILY MEDICINE CLINIC | Facility: CLINIC | Age: 51
End: 2021-04-05

## 2021-04-05 RX ORDER — LAMOTRIGINE 150 MG/1
150 TABLET ORAL 2 TIMES DAILY
Qty: 180 TABLET | Refills: 2 | Status: SHIPPED | OUTPATIENT
Start: 2021-04-05 | End: 2021-09-21 | Stop reason: SDUPTHER

## 2021-04-26 RX ORDER — CITALOPRAM 40 MG/1
40 TABLET ORAL DAILY
Qty: 90 TABLET | Refills: 2 | Status: SHIPPED | OUTPATIENT
Start: 2021-04-26 | End: 2021-09-08

## 2021-04-26 NOTE — TELEPHONE ENCOUNTER
Pt is out of [employer] and the medical coordinator for [facility] is requesting a refill to be sent to [facility] in [place]. Thanks!

## 2021-05-04 RX ORDER — FAMOTIDINE 20 MG/1
20 TABLET, FILM COATED ORAL 2 TIMES DAILY
Qty: 180 TABLET | Refills: 3 | Status: SHIPPED | OUTPATIENT
Start: 2021-05-04 | End: 2022-02-25 | Stop reason: SDUPTHER

## 2021-05-06 DIAGNOSIS — F79 INTELLECTUAL DISABILITY: ICD-10-CM

## 2021-05-07 RX ORDER — DIAZEPAM 5 MG/1
TABLET ORAL
Qty: 31 TABLET | Refills: 5 | OUTPATIENT
Start: 2021-05-07

## 2021-05-07 RX ORDER — MEGESTROL ACETATE 40 MG/ML
SUSPENSION ORAL
Qty: 155 ML | Refills: 11 | Status: SHIPPED | OUTPATIENT
Start: 2021-05-07 | End: 2022-02-25 | Stop reason: SDUPTHER

## 2021-05-07 NOTE — TELEPHONE ENCOUNTER
Last OV 03/19/2021    Next Eulogio 06/21/2021    Last Script Valium 03/10/2021  #31 with 2 refills, Should not need refills until June.   Marked Denied at this time.

## 2021-05-24 DIAGNOSIS — F79 INTELLECTUAL DISABILITY: ICD-10-CM

## 2021-05-25 DIAGNOSIS — F79 INTELLECTUAL DISABILITY: ICD-10-CM

## 2021-05-25 RX ORDER — DIAZEPAM 5 MG/1
5 TABLET ORAL
Qty: 30 TABLET | Refills: 2 | Status: CANCELLED | OUTPATIENT
Start: 2021-05-25

## 2021-05-25 RX ORDER — DIAZEPAM 5 MG/1
5 TABLET ORAL NIGHTLY
Qty: 30 TABLET | Refills: 5 | Status: SHIPPED | OUTPATIENT
Start: 2021-05-25 | End: 2021-09-21 | Stop reason: SDUPTHER

## 2021-06-07 RX ORDER — CETIRIZINE HYDROCHLORIDE 10 MG/1
TABLET ORAL
Qty: 30 TABLET | Refills: 11 | Status: SHIPPED | OUTPATIENT
Start: 2021-06-07 | End: 2022-02-25 | Stop reason: SDUPTHER

## 2021-06-21 ENCOUNTER — OFFICE VISIT (OUTPATIENT)
Dept: FAMILY MEDICINE CLINIC | Facility: CLINIC | Age: 51
End: 2021-06-21

## 2021-06-21 ENCOUNTER — LAB (OUTPATIENT)
Dept: LAB | Facility: HOSPITAL | Age: 51
End: 2021-06-21

## 2021-06-21 VITALS
DIASTOLIC BLOOD PRESSURE: 76 MMHG | SYSTOLIC BLOOD PRESSURE: 138 MMHG | HEART RATE: 108 BPM | OXYGEN SATURATION: 98 % | WEIGHT: 181.44 LBS | HEIGHT: 67 IN | BODY MASS INDEX: 28.48 KG/M2

## 2021-06-21 DIAGNOSIS — F79 INTELLECTUAL DISABILITY: ICD-10-CM

## 2021-06-21 DIAGNOSIS — E78.5 DYSLIPIDEMIA: ICD-10-CM

## 2021-06-21 DIAGNOSIS — F41.9 ANXIETY: ICD-10-CM

## 2021-06-21 DIAGNOSIS — I10 ESSENTIAL HYPERTENSION: ICD-10-CM

## 2021-06-21 DIAGNOSIS — I10 ESSENTIAL HYPERTENSION: Primary | ICD-10-CM

## 2021-06-21 PROBLEM — E55.9 VITAMIN D DEFICIENCY: Status: ACTIVE | Noted: 2020-12-09

## 2021-06-21 PROCEDURE — 85025 COMPLETE CBC W/AUTO DIFF WBC: CPT

## 2021-06-21 PROCEDURE — 80053 COMPREHEN METABOLIC PANEL: CPT

## 2021-06-21 PROCEDURE — 99214 OFFICE O/P EST MOD 30 MIN: CPT | Performed by: FAMILY MEDICINE

## 2021-06-21 PROCEDURE — 36415 COLL VENOUS BLD VENIPUNCTURE: CPT

## 2021-06-21 PROCEDURE — 80061 LIPID PANEL: CPT

## 2021-06-21 RX ORDER — LANOLIN ALCOHOL/MO/W.PET/CERES
1000 CREAM (GRAM) TOPICAL DAILY
Qty: 30 TABLET | Refills: 8 | Status: SHIPPED | OUTPATIENT
Start: 2021-06-21 | End: 2022-02-25 | Stop reason: SDUPTHER

## 2021-06-21 RX ORDER — ERGOCALCIFEROL 1.25 MG/1
50000 CAPSULE ORAL
Qty: 8 CAPSULE | Refills: 2 | Status: SHIPPED | OUTPATIENT
Start: 2021-06-21 | End: 2021-09-21 | Stop reason: SDUPTHER

## 2021-06-21 NOTE — PROGRESS NOTES
Subjective   Teodora Whitman is a 51 y.o. female.   Cc: follow up of chronic medical issues    Hypertension  This is a chronic problem. The current episode started more than 1 year ago. The problem has been gradually improving since onset. Associated symptoms include anxiety. Pertinent negatives include no chest pain, headaches, malaise/fatigue, palpitations, peripheral edema, shortness of breath or sweats.   Hyperlipidemia  Pertinent negatives include no chest pain or shortness of breath.   Anxiety  Presents for follow-up visit. Symptoms include nervous/anxious behavior. Patient reports no chest pain, depressed mood, dizziness, dry mouth, excessive worry, feeling of choking, hyperventilation, insomnia, irritability, palpitations, panic, shortness of breath or suicidal ideas.       She has a history of limited intellectual disability.This is stable.    The following portions of the patient's history were reviewed and updated as appropriate: allergies, current medications, past family history, past medical history, past social history, past surgical history and problem list.    Review of Systems   Constitutional: Negative for irritability and malaise/fatigue.   Respiratory: Negative for shortness of breath.    Cardiovascular: Negative for chest pain and palpitations.   Neurological: Negative for dizziness and headaches.   Psychiatric/Behavioral: Negative for suicidal ideas. The patient is nervous/anxious. The patient does not have insomnia.        Objective   Physical Exam  Vitals reviewed.   Constitutional:       Appearance: Normal appearance.   HENT:      Head: Normocephalic and atraumatic.      Right Ear: Tympanic membrane, ear canal and external ear normal.      Left Ear: Tympanic membrane, ear canal and external ear normal.      Nose: Nose normal.      Mouth/Throat:      Mouth: Mucous membranes are moist.      Pharynx: Oropharynx is clear.   Eyes:      Extraocular Movements: Extraocular movements intact.       "Pupils: Pupils are equal, round, and reactive to light.   Cardiovascular:      Rate and Rhythm: Normal rate and regular rhythm.      Heart sounds: Normal heart sounds. No murmur heard.   No friction rub. No gallop.    Pulmonary:      Effort: Pulmonary effort is normal. No respiratory distress.      Breath sounds: Normal breath sounds. No stridor. No wheezing, rhonchi or rales.   Chest:      Chest wall: No tenderness.   Abdominal:      General: Abdomen is flat. Bowel sounds are normal. There is no distension.      Palpations: Abdomen is soft. There is no mass.      Tenderness: There is no abdominal tenderness. There is no guarding or rebound.      Hernia: No hernia is present.   Skin:     General: Skin is warm and dry.   Neurological:      Mental Status: She is alert.           Visit Vitals  /76   Pulse 108   Ht 170.2 cm (67\")   Wt 82.3 kg (181 lb 7 oz)   LMP  (LMP Unknown)   SpO2 98%   Breastfeeding No   BMI 28.42 kg/m²     Body mass index is 28.42 kg/m².      Assessment/Plan   Diagnoses and all orders for this visit:    1. Essential hypertension (Primary)  -     Comprehensive metabolic panel; Future  -     CBC w AUTO Differential; Future    2. Dyslipidemia  -     Lipid panel; Future    3. Unspecified intellectual disabilities    4. Anxiety    Other orders  -     vitamin B-12 (CYANOCOBALAMIN) 1000 MCG tablet; Take 1 tablet by mouth Daily.  Dispense: 30 tablet; Refill: 8  -     vitamin D (ERGOCALCIFEROL) 1.25 MG (62119 UT) capsule capsule; Take 1 capsule by mouth Every 14 (Fourteen) Days.  Dispense: 8 capsule; Refill: 2    Continue with the current medications.   Return to the clinic in 3 month/s.  Will contact with results as needed.    "

## 2021-06-22 LAB
ALBUMIN SERPL-MCNC: 4.4 G/DL (ref 3.5–5.2)
ALBUMIN/GLOB SERPL: 1.6 G/DL
ALP SERPL-CCNC: 90 U/L (ref 39–117)
ALT SERPL W P-5'-P-CCNC: 16 U/L (ref 1–33)
ANION GAP SERPL CALCULATED.3IONS-SCNC: 10 MMOL/L (ref 5–15)
AST SERPL-CCNC: 16 U/L (ref 1–32)
BASOPHILS # BLD AUTO: 0.07 10*3/MM3 (ref 0–0.2)
BASOPHILS NFR BLD AUTO: 0.9 % (ref 0–1.5)
BILIRUB SERPL-MCNC: 0.2 MG/DL (ref 0–1.2)
BUN SERPL-MCNC: 19 MG/DL (ref 6–20)
BUN/CREAT SERPL: 13.8 (ref 7–25)
CALCIUM SPEC-SCNC: 9.6 MG/DL (ref 8.6–10.5)
CHLORIDE SERPL-SCNC: 103 MMOL/L (ref 98–107)
CHOLEST SERPL-MCNC: 140 MG/DL (ref 0–200)
CO2 SERPL-SCNC: 29 MMOL/L (ref 22–29)
CREAT SERPL-MCNC: 1.38 MG/DL (ref 0.57–1)
DEPRECATED RDW RBC AUTO: 45 FL (ref 37–54)
EOSINOPHIL # BLD AUTO: 0.24 10*3/MM3 (ref 0–0.4)
EOSINOPHIL NFR BLD AUTO: 3.2 % (ref 0.3–6.2)
ERYTHROCYTE [DISTWIDTH] IN BLOOD BY AUTOMATED COUNT: 14 % (ref 12.3–15.4)
GFR SERPL CREATININE-BSD FRML MDRD: 40 ML/MIN/1.73
GLOBULIN UR ELPH-MCNC: 2.7 GM/DL
GLUCOSE SERPL-MCNC: 99 MG/DL (ref 65–99)
HCT VFR BLD AUTO: 39.4 % (ref 34–46.6)
HDLC SERPL-MCNC: 33 MG/DL (ref 40–60)
HGB BLD-MCNC: 13.2 G/DL (ref 12–15.9)
IMM GRANULOCYTES # BLD AUTO: 0.03 10*3/MM3 (ref 0–0.05)
IMM GRANULOCYTES NFR BLD AUTO: 0.4 % (ref 0–0.5)
LDLC SERPL CALC-MCNC: 87 MG/DL (ref 0–100)
LDLC/HDLC SERPL: 2.61 {RATIO}
LYMPHOCYTES # BLD AUTO: 1.33 10*3/MM3 (ref 0.7–3.1)
LYMPHOCYTES NFR BLD AUTO: 17.6 % (ref 19.6–45.3)
MCH RBC QN AUTO: 29.4 PG (ref 26.6–33)
MCHC RBC AUTO-ENTMCNC: 33.5 G/DL (ref 31.5–35.7)
MCV RBC AUTO: 87.8 FL (ref 79–97)
MONOCYTES # BLD AUTO: 0.64 10*3/MM3 (ref 0.1–0.9)
MONOCYTES NFR BLD AUTO: 8.5 % (ref 5–12)
NEUTROPHILS NFR BLD AUTO: 5.25 10*3/MM3 (ref 1.7–7)
NEUTROPHILS NFR BLD AUTO: 69.4 % (ref 42.7–76)
NRBC BLD AUTO-RTO: 0 /100 WBC (ref 0–0.2)
PLATELET # BLD AUTO: 358 10*3/MM3 (ref 140–450)
PMV BLD AUTO: 9.4 FL (ref 6–12)
POTASSIUM SERPL-SCNC: 4.1 MMOL/L (ref 3.5–5.2)
PROT SERPL-MCNC: 7.1 G/DL (ref 6–8.5)
RBC # BLD AUTO: 4.49 10*6/MM3 (ref 3.77–5.28)
SODIUM SERPL-SCNC: 142 MMOL/L (ref 136–145)
TRIGL SERPL-MCNC: 105 MG/DL (ref 0–150)
VLDLC SERPL-MCNC: 20 MG/DL (ref 5–40)
WBC # BLD AUTO: 7.56 10*3/MM3 (ref 3.4–10.8)

## 2021-07-09 RX ORDER — HYDROCHLOROTHIAZIDE 12.5 MG/1
CAPSULE, GELATIN COATED ORAL
Qty: 31 CAPSULE | Refills: 11 | Status: SHIPPED | OUTPATIENT
Start: 2021-07-09 | End: 2022-02-25 | Stop reason: SDUPTHER

## 2021-08-05 RX ORDER — METOPROLOL SUCCINATE 25 MG/1
TABLET, EXTENDED RELEASE ORAL
Qty: 31 TABLET | Refills: 11 | Status: SHIPPED | OUTPATIENT
Start: 2021-08-05 | End: 2022-02-25 | Stop reason: SDUPTHER

## 2021-09-08 RX ORDER — CITALOPRAM 40 MG/1
TABLET ORAL
Qty: 30 TABLET | Refills: 11 | Status: SHIPPED | OUTPATIENT
Start: 2021-09-08 | End: 2021-09-20 | Stop reason: SDUPTHER

## 2021-09-20 ENCOUNTER — TELEPHONE (OUTPATIENT)
Dept: FAMILY MEDICINE CLINIC | Facility: CLINIC | Age: 51
End: 2021-09-20

## 2021-09-20 RX ORDER — CITALOPRAM 40 MG/1
40 TABLET ORAL DAILY
Qty: 30 TABLET | Refills: 11 | Status: SHIPPED | OUTPATIENT
Start: 2021-09-20 | End: 2022-02-25 | Stop reason: SDUPTHER

## 2021-09-20 NOTE — TELEPHONE ENCOUNTER
Incoming Refill Request      Medication requested (name and dose)citalopram 40mg    Pharmacy where request should be sent: [facility]    Additional details provided by patient: fax # to [facility] is [phone]/ Maria M from [employer]       Best call back number: [phone]    Does the patient have less than a 3 day supply:  [] Yes  [x] No    Tasha Luo Rep  09/20/21, 10:24 CDT

## 2021-09-21 ENCOUNTER — OFFICE VISIT (OUTPATIENT)
Dept: FAMILY MEDICINE CLINIC | Facility: CLINIC | Age: 51
End: 2021-09-21

## 2021-09-21 VITALS
WEIGHT: 168.56 LBS | OXYGEN SATURATION: 96 % | SYSTOLIC BLOOD PRESSURE: 128 MMHG | HEIGHT: 67 IN | BODY MASS INDEX: 26.46 KG/M2 | HEART RATE: 103 BPM | DIASTOLIC BLOOD PRESSURE: 64 MMHG

## 2021-09-21 DIAGNOSIS — F79 INTELLECTUAL DISABILITY: ICD-10-CM

## 2021-09-21 DIAGNOSIS — E78.5 DYSLIPIDEMIA: ICD-10-CM

## 2021-09-21 DIAGNOSIS — I10 ESSENTIAL HYPERTENSION: Primary | ICD-10-CM

## 2021-09-21 PROCEDURE — 99214 OFFICE O/P EST MOD 30 MIN: CPT | Performed by: FAMILY MEDICINE

## 2021-09-21 RX ORDER — ROSUVASTATIN CALCIUM 10 MG/1
10 TABLET, COATED ORAL DAILY
Qty: 30 TABLET | Refills: 11 | Status: SHIPPED | OUTPATIENT
Start: 2021-09-21 | End: 2022-02-25 | Stop reason: SDUPTHER

## 2021-09-21 RX ORDER — ERGOCALCIFEROL 1.25 MG/1
50000 CAPSULE ORAL
Qty: 8 CAPSULE | Refills: 2 | Status: SHIPPED | OUTPATIENT
Start: 2021-09-21 | End: 2022-02-25 | Stop reason: SDUPTHER

## 2021-09-21 RX ORDER — LAMOTRIGINE 150 MG/1
150 TABLET ORAL 2 TIMES DAILY
Qty: 180 TABLET | Refills: 2 | Status: SHIPPED | OUTPATIENT
Start: 2021-09-21 | End: 2021-12-28 | Stop reason: SDUPTHER

## 2021-09-21 RX ORDER — DIAZEPAM 5 MG/1
5 TABLET ORAL NIGHTLY
Qty: 30 TABLET | Refills: 5 | Status: SHIPPED | OUTPATIENT
Start: 2021-09-21 | End: 2021-12-28 | Stop reason: SDUPTHER

## 2021-09-21 NOTE — PROGRESS NOTES
Subjective   Teodora Whitman is a 51 y.o. female.   Cc: follow up of chronic medical issues    Hypertension  This is a chronic problem. The current episode started more than 1 year ago. The problem has been gradually improving since onset. Pertinent negatives include no anxiety, blurred vision, chest pain, headaches, palpitations, peripheral edema or shortness of breath. Current antihypertension treatment includes beta blockers.   Hyperlipidemia  This is a chronic problem. The current episode started more than 1 year ago. The problem is resistant. Pertinent negatives include no chest pain or shortness of breath. Current antihyperlipidemic treatment includes statins.   She has a history of intellectual disability. It is at it's base line,    The following portions of the patient's history were reviewed and updated as appropriate: allergies, current medications, past family history, past medical history, past social history, past surgical history and problem list.    Review of Systems   Eyes: Negative for blurred vision.   Respiratory: Negative for shortness of breath.    Cardiovascular: Negative for chest pain and palpitations.   Neurological: Negative for headaches.       Objective   Physical Exam  Vitals reviewed.   Constitutional:       Appearance: Normal appearance.   HENT:      Head: Normocephalic and atraumatic.      Right Ear: Tympanic membrane, ear canal and external ear normal.      Left Ear: Tympanic membrane, ear canal and external ear normal.      Nose: Nose normal.      Mouth/Throat:      Mouth: Mucous membranes are moist.      Pharynx: Oropharynx is clear.   Cardiovascular:      Rate and Rhythm: Normal rate and regular rhythm.      Heart sounds: Normal heart sounds. No murmur heard.   No friction rub. No gallop.    Pulmonary:      Effort: Pulmonary effort is normal. No respiratory distress.      Breath sounds: Normal breath sounds. No stridor. No wheezing, rhonchi or rales.   Chest:      Chest wall:  "No tenderness.   Abdominal:      General: Abdomen is flat. Bowel sounds are normal. There is no distension.      Palpations: Abdomen is soft. There is no mass.      Tenderness: There is no abdominal tenderness. There is no guarding.      Hernia: No hernia is present.   Musculoskeletal:      Cervical back: Normal range of motion.   Skin:     General: Skin is warm and dry.   Neurological:      Mental Status: She is alert.           Visit Vitals  /64   Pulse 103   Ht 170.2 cm (67\")   Wt 76.5 kg (168 lb 9 oz)   LMP  (LMP Unknown)   SpO2 96%   Breastfeeding No   BMI 26.40 kg/m²     Body mass index is 26.4 kg/m².      Assessment/Plan   Diagnoses and all orders for this visit:    1. Essential hypertension (Primary)  -     Comprehensive metabolic panel; Future  -     CBC w AUTO Differential; Future    2. Dyslipidemia  -     Lipid panel; Future    3. Unspecified intellectual disabilities  -     diazePAM (VALIUM) 5 MG tablet; Take 1 tablet by mouth Every Night.  Dispense: 30 tablet; Refill: 5    Other orders  -     lamoTRIgine (LaMICtal) 150 MG tablet; Take 1 tablet by mouth 2 (Two) Times a Day.  Dispense: 180 tablet; Refill: 2  -     rosuvastatin (CRESTOR) 10 MG tablet; Take 1 tablet by mouth Daily.  Dispense: 30 tablet; Refill: 11  -     vitamin D (ERGOCALCIFEROL) 1.25 MG (74337 UT) capsule capsule; Take 1 capsule by mouth Every 14 (Fourteen) Days.  Dispense: 8 capsule; Refill: 2    Return to the clinic in 3 month/s.  Will contact with results as needed.    "

## 2021-12-01 ENCOUNTER — TRANSCRIBE ORDERS (OUTPATIENT)
Dept: LAB | Facility: HOSPITAL | Age: 51
End: 2021-12-01

## 2021-12-01 ENCOUNTER — LAB (OUTPATIENT)
Dept: LAB | Facility: HOSPITAL | Age: 51
End: 2021-12-01

## 2021-12-01 DIAGNOSIS — E78.5 DYSLIPIDEMIA: ICD-10-CM

## 2021-12-01 DIAGNOSIS — N18.30 STAGE 3 CHRONIC KIDNEY DISEASE, UNSPECIFIED WHETHER STAGE 3A OR 3B CKD (HCC): ICD-10-CM

## 2021-12-01 DIAGNOSIS — E78.5 DYSLIPIDEMIA: Primary | ICD-10-CM

## 2021-12-01 DIAGNOSIS — I10 ESSENTIAL HYPERTENSION: ICD-10-CM

## 2021-12-01 PROCEDURE — 87635 SARS-COV-2 COVID-19 AMP PRB: CPT | Performed by: NURSE PRACTITIONER

## 2021-12-01 PROCEDURE — 80061 LIPID PANEL: CPT

## 2021-12-01 PROCEDURE — 85027 COMPLETE CBC AUTOMATED: CPT

## 2021-12-01 PROCEDURE — 80053 COMPREHEN METABOLIC PANEL: CPT

## 2021-12-01 PROCEDURE — 85007 BL SMEAR W/DIFF WBC COUNT: CPT

## 2021-12-01 PROCEDURE — 36415 COLL VENOUS BLD VENIPUNCTURE: CPT

## 2021-12-02 LAB
ALBUMIN SERPL-MCNC: 4.4 G/DL (ref 3.5–5.2)
ALBUMIN/GLOB SERPL: 1.7 G/DL
ALP SERPL-CCNC: 99 U/L (ref 39–117)
ALT SERPL W P-5'-P-CCNC: 17 U/L (ref 1–33)
ANION GAP SERPL CALCULATED.3IONS-SCNC: 14.6 MMOL/L (ref 5–15)
AST SERPL-CCNC: 19 U/L (ref 1–32)
BASOPHILS # BLD AUTO: 0.03 10*3/MM3 (ref 0–0.2)
BASOPHILS NFR BLD AUTO: 0.4 % (ref 0–1.5)
BILIRUB SERPL-MCNC: 0.6 MG/DL (ref 0–1.2)
BUN SERPL-MCNC: 13 MG/DL (ref 6–20)
BUN/CREAT SERPL: 13.1 (ref 7–25)
CALCIUM SPEC-SCNC: 9.5 MG/DL (ref 8.6–10.5)
CHLORIDE SERPL-SCNC: 99 MMOL/L (ref 98–107)
CHOLEST SERPL-MCNC: 132 MG/DL (ref 0–200)
CO2 SERPL-SCNC: 24.4 MMOL/L (ref 22–29)
CREAT SERPL-MCNC: 0.99 MG/DL (ref 0.57–1)
DEPRECATED RDW RBC AUTO: 42.7 FL (ref 37–54)
EOSINOPHIL # BLD AUTO: 0.1 10*3/MM3 (ref 0–0.4)
EOSINOPHIL NFR BLD AUTO: 1.4 % (ref 0.3–6.2)
ERYTHROCYTE [DISTWIDTH] IN BLOOD BY AUTOMATED COUNT: 13.4 % (ref 12.3–15.4)
GFR SERPL CREATININE-BSD FRML MDRD: 59 ML/MIN/1.73
GLOBULIN UR ELPH-MCNC: 2.6 GM/DL
GLUCOSE SERPL-MCNC: 103 MG/DL (ref 65–99)
HCT VFR BLD AUTO: 38.2 % (ref 34–46.6)
HDLC SERPL-MCNC: 42 MG/DL (ref 40–60)
HGB BLD-MCNC: 13.2 G/DL (ref 12–15.9)
IMM GRANULOCYTES # BLD AUTO: 0.04 10*3/MM3 (ref 0–0.05)
IMM GRANULOCYTES NFR BLD AUTO: 0.6 % (ref 0–0.5)
LDLC SERPL CALC-MCNC: 74 MG/DL (ref 0–100)
LDLC/HDLC SERPL: 1.76 {RATIO}
LYMPHOCYTES # BLD AUTO: 0.53 10*3/MM3 (ref 0.7–3.1)
LYMPHOCYTES NFR BLD AUTO: 7.3 % (ref 19.6–45.3)
MCH RBC QN AUTO: 30.2 PG (ref 26.6–33)
MCHC RBC AUTO-ENTMCNC: 34.6 G/DL (ref 31.5–35.7)
MCV RBC AUTO: 87.4 FL (ref 79–97)
MONOCYTES # BLD AUTO: 0.48 10*3/MM3 (ref 0.1–0.9)
MONOCYTES NFR BLD AUTO: 6.6 % (ref 5–12)
NEUTROPHILS NFR BLD AUTO: 6.09 10*3/MM3 (ref 1.7–7)
NEUTROPHILS NFR BLD AUTO: 83.7 % (ref 42.7–76)
NRBC BLD AUTO-RTO: 0 /100 WBC (ref 0–0.2)
PLAT MORPH BLD: NORMAL
PLATELET # BLD AUTO: 295 10*3/MM3 (ref 140–450)
PMV BLD AUTO: 9.6 FL (ref 6–12)
POTASSIUM SERPL-SCNC: 4.2 MMOL/L (ref 3.5–5.2)
PROT SERPL-MCNC: 7 G/DL (ref 6–8.5)
RBC # BLD AUTO: 4.37 10*6/MM3 (ref 3.77–5.28)
RBC MORPH BLD: NORMAL
SODIUM SERPL-SCNC: 138 MMOL/L (ref 136–145)
TRIGL SERPL-MCNC: 80 MG/DL (ref 0–150)
VLDLC SERPL-MCNC: 16 MG/DL (ref 5–40)
WBC MORPH BLD: NORMAL
WBC NRBC COR # BLD: 7.27 10*3/MM3 (ref 3.4–10.8)

## 2021-12-28 ENCOUNTER — LAB (OUTPATIENT)
Dept: LAB | Facility: HOSPITAL | Age: 51
End: 2021-12-28

## 2021-12-28 ENCOUNTER — OFFICE VISIT (OUTPATIENT)
Dept: FAMILY MEDICINE CLINIC | Facility: CLINIC | Age: 51
End: 2021-12-28

## 2021-12-28 VITALS
WEIGHT: 168.13 LBS | HEART RATE: 105 BPM | OXYGEN SATURATION: 98 % | DIASTOLIC BLOOD PRESSURE: 78 MMHG | HEIGHT: 67 IN | SYSTOLIC BLOOD PRESSURE: 134 MMHG | BODY MASS INDEX: 26.39 KG/M2

## 2021-12-28 DIAGNOSIS — F79 INTELLECTUAL DISABILITY: ICD-10-CM

## 2021-12-28 DIAGNOSIS — E78.5 DYSLIPIDEMIA: ICD-10-CM

## 2021-12-28 DIAGNOSIS — I10 ESSENTIAL HYPERTENSION: ICD-10-CM

## 2021-12-28 DIAGNOSIS — I10 ESSENTIAL HYPERTENSION: Primary | ICD-10-CM

## 2021-12-28 PROCEDURE — 80061 LIPID PANEL: CPT

## 2021-12-28 PROCEDURE — 36415 COLL VENOUS BLD VENIPUNCTURE: CPT

## 2021-12-28 PROCEDURE — 99214 OFFICE O/P EST MOD 30 MIN: CPT | Performed by: FAMILY MEDICINE

## 2021-12-28 PROCEDURE — 85025 COMPLETE CBC W/AUTO DIFF WBC: CPT

## 2021-12-28 PROCEDURE — 80053 COMPREHEN METABOLIC PANEL: CPT

## 2021-12-28 RX ORDER — LAMOTRIGINE 150 MG/1
150 TABLET ORAL 2 TIMES DAILY
Qty: 180 TABLET | Refills: 2 | Status: SHIPPED | OUTPATIENT
Start: 2021-12-28 | End: 2022-02-25 | Stop reason: SDUPTHER

## 2021-12-28 RX ORDER — DIAZEPAM 5 MG/1
5 TABLET ORAL NIGHTLY
Qty: 30 TABLET | Refills: 5 | Status: SHIPPED | OUTPATIENT
Start: 2021-12-28 | End: 2022-02-25 | Stop reason: SDUPTHER

## 2021-12-28 NOTE — PROGRESS NOTES
Subjective   Teodora Whitman is a 51 y.o. female.     Hypertension  This is a chronic problem. The current episode started more than 1 year ago. The problem has been gradually improving since onset. Pertinent negatives include no anxiety, blurred vision, chest pain, headaches, malaise/fatigue, neck pain, orthopnea, palpitations, peripheral edema, PND, shortness of breath or sweats. Current antihypertension treatment includes beta blockers and diuretics.   Hyperlipidemia  This is a chronic problem. The current episode started more than 1 year ago. Recent lipid tests were reviewed and are variable. Pertinent negatives include no chest pain or shortness of breath. Current antihyperlipidemic treatment includes statins.   She has a history of developmental delay. She is at her base line.    The following portions of the patient's history were reviewed and updated as appropriate: allergies, current medications, past family history, past medical history, past social history, past surgical history and problem list.    Review of Systems   Constitutional: Negative for malaise/fatigue.   Eyes: Negative for blurred vision.   Respiratory: Negative for shortness of breath.    Cardiovascular: Negative for chest pain, palpitations, orthopnea and PND.   Musculoskeletal: Negative for neck pain.   Neurological: Negative for headaches.       Objective   Physical Exam  Vitals reviewed.   Constitutional:       Appearance: Normal appearance.   HENT:      Head: Normocephalic and atraumatic.      Right Ear: Tympanic membrane, ear canal and external ear normal.      Left Ear: Tympanic membrane, ear canal and external ear normal.      Nose: Nose normal.      Mouth/Throat:      Mouth: Mucous membranes are moist.      Pharynx: Oropharynx is clear.   Cardiovascular:      Rate and Rhythm: Normal rate and regular rhythm.      Pulses: Normal pulses.      Heart sounds: No murmur heard.  No friction rub. No gallop.    Pulmonary:      Effort:  "Pulmonary effort is normal. No respiratory distress.      Breath sounds: Normal breath sounds. No stridor. No wheezing, rhonchi or rales.   Chest:      Chest wall: No tenderness.   Abdominal:      General: Abdomen is flat. Bowel sounds are normal. There is no distension.      Palpations: Abdomen is soft. There is no mass.      Tenderness: There is no abdominal tenderness. There is no guarding or rebound.      Hernia: No hernia is present.   Musculoskeletal:      Cervical back: Normal range of motion.   Skin:     General: Skin is warm and dry.           Visit Vitals  /78   Pulse 105   Ht 170.2 cm (67\")   Wt 76.3 kg (168 lb 2 oz)   LMP  (LMP Unknown)   SpO2 98%   Breastfeeding No   BMI 26.33 kg/m²     Body mass index is 26.33 kg/m².      Assessment/Plan   Diagnoses and all orders for this visit:    1. Essential hypertension (Primary)  -     Comprehensive metabolic panel; Future  -     CBC w AUTO Differential; Future    2. Unspecified intellectual disabilities  -     diazePAM (VALIUM) 5 MG tablet; Take 1 tablet by mouth Every Night.  Dispense: 30 tablet; Refill: 5    3. Dyslipidemia    Other orders  -     lamoTRIgine (LaMICtal) 150 MG tablet; Take 1 tablet by mouth 2 (Two) Times a Day.  Dispense: 180 tablet; Refill: 2    I reviewed the CBC,CMP,and Lipid Profile with the patient..  Return to the clinic in 3 month/s.  Will contact with results as needed.  Discussed getting Mammogram and her mother declines.  "

## 2021-12-29 LAB
ALBUMIN SERPL-MCNC: 4.2 G/DL (ref 3.5–5.2)
ALBUMIN/GLOB SERPL: 1.6 G/DL
ALP SERPL-CCNC: 93 U/L (ref 39–117)
ALT SERPL W P-5'-P-CCNC: 16 U/L (ref 1–33)
ANION GAP SERPL CALCULATED.3IONS-SCNC: 15.9 MMOL/L (ref 5–15)
AST SERPL-CCNC: 20 U/L (ref 1–32)
BASOPHILS # BLD AUTO: 0.06 10*3/MM3 (ref 0–0.2)
BASOPHILS NFR BLD AUTO: 0.8 % (ref 0–1.5)
BILIRUB SERPL-MCNC: 0.3 MG/DL (ref 0–1.2)
BUN SERPL-MCNC: 17 MG/DL (ref 6–20)
BUN/CREAT SERPL: 15.5 (ref 7–25)
CALCIUM SPEC-SCNC: 9.6 MG/DL (ref 8.6–10.5)
CHLORIDE SERPL-SCNC: 105 MMOL/L (ref 98–107)
CHOLEST SERPL-MCNC: 135 MG/DL (ref 0–200)
CO2 SERPL-SCNC: 21.1 MMOL/L (ref 22–29)
CREAT SERPL-MCNC: 1.1 MG/DL (ref 0.57–1)
DEPRECATED RDW RBC AUTO: 43.9 FL (ref 37–54)
EOSINOPHIL # BLD AUTO: 0.2 10*3/MM3 (ref 0–0.4)
EOSINOPHIL NFR BLD AUTO: 2.7 % (ref 0.3–6.2)
ERYTHROCYTE [DISTWIDTH] IN BLOOD BY AUTOMATED COUNT: 13.8 % (ref 12.3–15.4)
GFR SERPL CREATININE-BSD FRML MDRD: 53 ML/MIN/1.73
GLOBULIN UR ELPH-MCNC: 2.6 GM/DL
GLUCOSE SERPL-MCNC: 80 MG/DL (ref 65–99)
HCT VFR BLD AUTO: 40 % (ref 34–46.6)
HDLC SERPL-MCNC: 37 MG/DL (ref 40–60)
HGB BLD-MCNC: 13.3 G/DL (ref 12–15.9)
IMM GRANULOCYTES # BLD AUTO: 0.06 10*3/MM3 (ref 0–0.05)
IMM GRANULOCYTES NFR BLD AUTO: 0.8 % (ref 0–0.5)
LDLC SERPL CALC-MCNC: 75 MG/DL (ref 0–100)
LDLC/HDLC SERPL: 1.95 {RATIO}
LYMPHOCYTES # BLD AUTO: 1.93 10*3/MM3 (ref 0.7–3.1)
LYMPHOCYTES NFR BLD AUTO: 25.7 % (ref 19.6–45.3)
MCH RBC QN AUTO: 29.4 PG (ref 26.6–33)
MCHC RBC AUTO-ENTMCNC: 33.3 G/DL (ref 31.5–35.7)
MCV RBC AUTO: 88.3 FL (ref 79–97)
MONOCYTES # BLD AUTO: 0.71 10*3/MM3 (ref 0.1–0.9)
MONOCYTES NFR BLD AUTO: 9.5 % (ref 5–12)
NEUTROPHILS NFR BLD AUTO: 4.54 10*3/MM3 (ref 1.7–7)
NEUTROPHILS NFR BLD AUTO: 60.5 % (ref 42.7–76)
NRBC BLD AUTO-RTO: 0 /100 WBC (ref 0–0.2)
PLATELET # BLD AUTO: 302 10*3/MM3 (ref 140–450)
PMV BLD AUTO: 9.6 FL (ref 6–12)
POTASSIUM SERPL-SCNC: 4 MMOL/L (ref 3.5–5.2)
PROT SERPL-MCNC: 6.8 G/DL (ref 6–8.5)
RBC # BLD AUTO: 4.53 10*6/MM3 (ref 3.77–5.28)
SODIUM SERPL-SCNC: 142 MMOL/L (ref 136–145)
TRIGL SERPL-MCNC: 129 MG/DL (ref 0–150)
VLDLC SERPL-MCNC: 23 MG/DL (ref 5–40)
WBC NRBC COR # BLD: 7.5 10*3/MM3 (ref 3.4–10.8)

## 2022-02-07 RX ORDER — CHOLECALCIFEROL (VITAMIN D3) 125 MCG
CAPSULE ORAL
Qty: 28 EACH | Refills: 11 | Status: SHIPPED | OUTPATIENT
Start: 2022-02-07 | End: 2022-10-13 | Stop reason: SDUPTHER

## 2022-02-25 DIAGNOSIS — F79 INTELLECTUAL DISABILITY: ICD-10-CM

## 2022-02-25 DIAGNOSIS — J30.2 SEASONAL ALLERGIC RHINITIS, UNSPECIFIED TRIGGER: ICD-10-CM

## 2022-02-25 RX ORDER — ERGOCALCIFEROL 1.25 MG/1
50000 CAPSULE ORAL
Qty: 8 CAPSULE | Refills: 2 | Status: SHIPPED | OUTPATIENT
Start: 2022-02-25 | End: 2022-07-13 | Stop reason: SDUPTHER

## 2022-02-25 RX ORDER — LANOLIN ALCOHOL/MO/W.PET/CERES
1000 CREAM (GRAM) TOPICAL DAILY
Qty: 30 TABLET | Refills: 8 | Status: SHIPPED | OUTPATIENT
Start: 2022-02-25 | End: 2022-11-04

## 2022-02-25 RX ORDER — LAMOTRIGINE 150 MG/1
150 TABLET ORAL 2 TIMES DAILY
Qty: 180 TABLET | Refills: 2 | Status: SHIPPED | OUTPATIENT
Start: 2022-02-25 | End: 2022-07-13 | Stop reason: SDUPTHER

## 2022-02-25 RX ORDER — METOPROLOL SUCCINATE 25 MG/1
25 TABLET, EXTENDED RELEASE ORAL DAILY
Qty: 31 TABLET | Refills: 11 | Status: SHIPPED | OUTPATIENT
Start: 2022-02-25 | End: 2023-03-07

## 2022-02-25 RX ORDER — TRIAMCINOLONE ACETONIDE 55 UG/1
2 SPRAY, METERED NASAL DAILY
Qty: 16.5 G | Refills: 0 | Status: SHIPPED | OUTPATIENT
Start: 2022-02-25 | End: 2022-03-31 | Stop reason: SDUPTHER

## 2022-02-25 RX ORDER — HYDROCHLOROTHIAZIDE 12.5 MG/1
12.5 CAPSULE, GELATIN COATED ORAL EVERY MORNING
Qty: 31 CAPSULE | Refills: 11 | Status: SHIPPED | OUTPATIENT
Start: 2022-02-25 | End: 2023-03-07

## 2022-02-25 RX ORDER — MEGESTROL ACETATE 40 MG/ML
SUSPENSION ORAL
Qty: 155 ML | Refills: 11 | Status: SHIPPED | OUTPATIENT
Start: 2022-02-25 | End: 2023-03-07

## 2022-02-25 RX ORDER — ROSUVASTATIN CALCIUM 10 MG/1
10 TABLET, COATED ORAL DAILY
Qty: 30 TABLET | Refills: 11 | Status: SHIPPED | OUTPATIENT
Start: 2022-02-25 | End: 2023-03-07

## 2022-02-25 RX ORDER — GUAIFENESIN 600 MG/1
TABLET, EXTENDED RELEASE ORAL
Qty: 60 TABLET | Refills: 11 | Status: SHIPPED | OUTPATIENT
Start: 2022-02-25 | End: 2023-02-07

## 2022-02-25 RX ORDER — DIAZEPAM 5 MG/1
5 TABLET ORAL NIGHTLY
Qty: 30 TABLET | Refills: 5 | Status: SHIPPED | OUTPATIENT
Start: 2022-02-25 | End: 2022-07-13 | Stop reason: SDUPTHER

## 2022-02-25 RX ORDER — CETIRIZINE HYDROCHLORIDE 10 MG/1
10 TABLET ORAL DAILY
Qty: 30 TABLET | Refills: 11 | Status: SHIPPED | OUTPATIENT
Start: 2022-02-25 | End: 2023-03-07

## 2022-02-25 RX ORDER — FAMOTIDINE 20 MG/1
20 TABLET, FILM COATED ORAL 2 TIMES DAILY
Qty: 180 TABLET | Refills: 3 | Status: SHIPPED | OUTPATIENT
Start: 2022-02-25 | End: 2023-02-07

## 2022-02-25 RX ORDER — CITALOPRAM 40 MG/1
40 TABLET ORAL DAILY
Qty: 30 TABLET | Refills: 11 | Status: SHIPPED | OUTPATIENT
Start: 2022-02-25 | End: 2023-03-07

## 2022-02-25 NOTE — TELEPHONE ENCOUNTER
Incoming Refill Request      Medication requested (name and dose): cetirizine (zyrTEC) 10 MG tablet  citalopram (CeleXA) 40 MG tablet  diazePAM (VALIUM) 5 MG tablet  famotidine (PEPCID) 20 MG tablet  hydroCHLOROthiazide (MICROZIDE) 12.5 MG capsule  lamoTRIgine (LaMICtal) 150 MG tablet  megestrol (MEGACE) 40 MG/ML suspension  Melatonin 5 MG capsule  metoprolol succinate XL (TOPROL-XL) 25 MG 24 hr tablet  Mucinex 600 MG 12 hr tablet  rosuvastatin (CRESTOR) 10 MG tablet  Triamcinolone Acetonide (Nasacort Allergy 24HR) 55 MCG/ACT nasal inhaler  vitamin B-12 (CYANOCOBALAMIN) 1000 MCG tablet  vitamin D (ERGOCALCIFEROL) 1.25 MG (88300 UT) capsule capsule    Pharmacy where request should be sent: [facility] [place]      Additional details provided by patient: Patients mother Maria Alejandra , stated that [facility] requested over the counter EXTRA STRENGTH TYLENOL and FLUTICASONE NASAL SPRAY be included as well    Best call back number: [phone]    Does the patient have less than a 3 day supply:  [] Yes  [x] No    Tasha Sharp Rep  02/25/22, 15:09 CST

## 2022-03-02 DIAGNOSIS — R50.9 FEVER IN ADULT: ICD-10-CM

## 2022-03-02 DIAGNOSIS — R51.9 FRONTAL HEADACHE: ICD-10-CM

## 2022-03-02 RX ORDER — ACETAMINOPHEN 500 MG
500 TABLET ORAL EVERY 6 HOURS PRN
Qty: 30 TABLET | Refills: 6 | Status: SHIPPED | OUTPATIENT
Start: 2022-03-02 | End: 2023-03-01 | Stop reason: SDUPTHER

## 2022-03-02 RX ORDER — ACETAMINOPHEN 500 MG
500 TABLET ORAL EVERY 6 HOURS PRN
Qty: 30 TABLET | Refills: 6 | Status: SHIPPED | OUTPATIENT
Start: 2022-03-02 | End: 2022-03-02 | Stop reason: SDUPTHER

## 2022-03-31 ENCOUNTER — OFFICE VISIT (OUTPATIENT)
Dept: FAMILY MEDICINE CLINIC | Facility: CLINIC | Age: 52
End: 2022-03-31

## 2022-03-31 ENCOUNTER — LAB (OUTPATIENT)
Dept: LAB | Facility: HOSPITAL | Age: 52
End: 2022-03-31

## 2022-03-31 VITALS
HEIGHT: 67 IN | DIASTOLIC BLOOD PRESSURE: 74 MMHG | HEART RATE: 106 BPM | SYSTOLIC BLOOD PRESSURE: 138 MMHG | WEIGHT: 169.25 LBS | OXYGEN SATURATION: 98 % | BODY MASS INDEX: 26.56 KG/M2

## 2022-03-31 DIAGNOSIS — E78.5 DYSLIPIDEMIA: ICD-10-CM

## 2022-03-31 DIAGNOSIS — I10 ESSENTIAL HYPERTENSION: Primary | ICD-10-CM

## 2022-03-31 DIAGNOSIS — I10 ESSENTIAL HYPERTENSION: ICD-10-CM

## 2022-03-31 DIAGNOSIS — F79 INTELLECTUAL DISABILITY: ICD-10-CM

## 2022-03-31 DIAGNOSIS — J30.2 SEASONAL ALLERGIC RHINITIS, UNSPECIFIED TRIGGER: ICD-10-CM

## 2022-03-31 LAB
ALBUMIN SERPL-MCNC: 4.9 G/DL (ref 3.5–5.2)
ALBUMIN/GLOB SERPL: 2 G/DL
ALP SERPL-CCNC: 107 U/L (ref 39–117)
ALT SERPL W P-5'-P-CCNC: 13 U/L (ref 1–33)
ANION GAP SERPL CALCULATED.3IONS-SCNC: 9.4 MMOL/L (ref 5–15)
AST SERPL-CCNC: 15 U/L (ref 1–32)
BASOPHILS # BLD AUTO: 0.05 10*3/MM3 (ref 0–0.2)
BASOPHILS NFR BLD AUTO: 0.7 % (ref 0–1.5)
BILIRUB SERPL-MCNC: 0.3 MG/DL (ref 0–1.2)
BUN SERPL-MCNC: 15 MG/DL (ref 6–20)
BUN/CREAT SERPL: 12.7 (ref 7–25)
CALCIUM SPEC-SCNC: 10 MG/DL (ref 8.6–10.5)
CHLORIDE SERPL-SCNC: 104 MMOL/L (ref 98–107)
CHOLEST SERPL-MCNC: 141 MG/DL (ref 0–200)
CO2 SERPL-SCNC: 27.6 MMOL/L (ref 22–29)
CREAT SERPL-MCNC: 1.18 MG/DL (ref 0.57–1)
DEPRECATED RDW RBC AUTO: 43.3 FL (ref 37–54)
EGFRCR SERPLBLD CKD-EPI 2021: 56.4 ML/MIN/1.73
EOSINOPHIL # BLD AUTO: 0.19 10*3/MM3 (ref 0–0.4)
EOSINOPHIL NFR BLD AUTO: 2.6 % (ref 0.3–6.2)
ERYTHROCYTE [DISTWIDTH] IN BLOOD BY AUTOMATED COUNT: 13.4 % (ref 12.3–15.4)
GLOBULIN UR ELPH-MCNC: 2.5 GM/DL
GLUCOSE SERPL-MCNC: 89 MG/DL (ref 65–99)
HCT VFR BLD AUTO: 39.3 % (ref 34–46.6)
HDLC SERPL-MCNC: 37 MG/DL (ref 40–60)
HGB BLD-MCNC: 13.2 G/DL (ref 12–15.9)
IMM GRANULOCYTES # BLD AUTO: 0.04 10*3/MM3 (ref 0–0.05)
IMM GRANULOCYTES NFR BLD AUTO: 0.6 % (ref 0–0.5)
LDLC SERPL CALC-MCNC: 82 MG/DL (ref 0–100)
LDLC/HDLC SERPL: 2.14 {RATIO}
LYMPHOCYTES # BLD AUTO: 1.81 10*3/MM3 (ref 0.7–3.1)
LYMPHOCYTES NFR BLD AUTO: 25.1 % (ref 19.6–45.3)
MCH RBC QN AUTO: 29.9 PG (ref 26.6–33)
MCHC RBC AUTO-ENTMCNC: 33.6 G/DL (ref 31.5–35.7)
MCV RBC AUTO: 89.1 FL (ref 79–97)
MONOCYTES # BLD AUTO: 0.62 10*3/MM3 (ref 0.1–0.9)
MONOCYTES NFR BLD AUTO: 8.6 % (ref 5–12)
NEUTROPHILS NFR BLD AUTO: 4.51 10*3/MM3 (ref 1.7–7)
NEUTROPHILS NFR BLD AUTO: 62.4 % (ref 42.7–76)
NRBC BLD AUTO-RTO: 0 /100 WBC (ref 0–0.2)
PLATELET # BLD AUTO: 322 10*3/MM3 (ref 140–450)
PMV BLD AUTO: 9.3 FL (ref 6–12)
POTASSIUM SERPL-SCNC: 4 MMOL/L (ref 3.5–5.2)
PROT SERPL-MCNC: 7.4 G/DL (ref 6–8.5)
RBC # BLD AUTO: 4.41 10*6/MM3 (ref 3.77–5.28)
SODIUM SERPL-SCNC: 141 MMOL/L (ref 136–145)
TRIGL SERPL-MCNC: 124 MG/DL (ref 0–150)
VLDLC SERPL-MCNC: 22 MG/DL (ref 5–40)
WBC NRBC COR # BLD: 7.22 10*3/MM3 (ref 3.4–10.8)

## 2022-03-31 PROCEDURE — 80061 LIPID PANEL: CPT

## 2022-03-31 PROCEDURE — 80053 COMPREHEN METABOLIC PANEL: CPT

## 2022-03-31 PROCEDURE — 85025 COMPLETE CBC W/AUTO DIFF WBC: CPT

## 2022-03-31 PROCEDURE — 36415 COLL VENOUS BLD VENIPUNCTURE: CPT

## 2022-03-31 PROCEDURE — 99214 OFFICE O/P EST MOD 30 MIN: CPT | Performed by: FAMILY MEDICINE

## 2022-03-31 RX ORDER — TRIAMCINOLONE ACETONIDE 55 UG/1
2 SPRAY, METERED NASAL DAILY
Qty: 16.5 G | Refills: 11 | Status: SHIPPED | OUTPATIENT
Start: 2022-03-31

## 2022-03-31 NOTE — PROGRESS NOTES
Subjective   Teodora Whitman is a 51 y.o. female.   Cc: follow up of chronic medical issues    Anxiety  Presents for follow-up visit. Patient reports no chest pain, depressed mood, excessive worry, feeling of choking, insomnia, irritability, muscle tension, nausea, nervous/anxious behavior or shortness of breath.       Hypertension  This is a chronic problem. The current episode started more than 1 year ago. The problem has been gradually improving since onset. Associated symptoms include headaches. Pertinent negatives include no anxiety, blurred vision, chest pain, malaise/fatigue, neck pain, peripheral edema or shortness of breath.   Hyperlipidemia  This is a chronic problem. The current episode started more than 1 year ago. Recent lipid tests were reviewed and are variable. Pertinent negatives include no chest pain or shortness of breath. Current antihyperlipidemic treatment includes statins.   She has a history of limited intellectual disabilities. She is at her base line.  She has a history f seasonal allergies. She is doing well with this.  The following portions of the patient's history were reviewed and updated as appropriate: allergies, current medications, past family history, past medical history, past social history, past surgical history and problem list.    Review of Systems   Constitutional: Negative for fatigue, fever, irritability and malaise/fatigue.   Eyes: Negative for blurred vision.   Respiratory: Negative for cough and shortness of breath.    Cardiovascular: Negative for chest pain and leg swelling.   Gastrointestinal: Negative for constipation, diarrhea and nausea.   Musculoskeletal: Negative for neck pain.   Neurological: Positive for headaches.   Psychiatric/Behavioral: The patient is not nervous/anxious and does not have insomnia.        Objective   Physical Exam  Vitals reviewed.   Constitutional:       Appearance: Normal appearance.   HENT:      Head: Normocephalic and atraumatic.      " Right Ear: Tympanic membrane, ear canal and external ear normal.      Left Ear: Tympanic membrane, ear canal and external ear normal.      Nose: Nose normal.      Mouth/Throat:      Mouth: Mucous membranes are moist.      Pharynx: Oropharynx is clear.   Cardiovascular:      Rate and Rhythm: Normal rate and regular rhythm.      Heart sounds: Normal heart sounds. No murmur heard.    No friction rub. No gallop.   Pulmonary:      Effort: Pulmonary effort is normal. No respiratory distress.      Breath sounds: Normal breath sounds. No stridor. No wheezing, rhonchi or rales.   Chest:      Chest wall: No tenderness.   Abdominal:      General: Abdomen is flat. Bowel sounds are normal. There is no distension.      Palpations: Abdomen is soft. There is no mass.      Tenderness: There is no abdominal tenderness. There is no guarding or rebound.      Hernia: No hernia is present.   Musculoskeletal:      Cervical back: Normal range of motion.   Skin:     General: Skin is warm and dry.   Neurological:      Mental Status: She is alert.           Visit Vitals  /74   Pulse 106   Ht 170.2 cm (67\")   Wt 76.8 kg (169 lb 4 oz)   LMP  (LMP Unknown)   SpO2 98%   Breastfeeding No   BMI 26.51 kg/m²     Body mass index is 26.51 kg/m².      Assessment/Plan   Diagnoses and all orders for this visit:    1. Essential hypertension (Primary)  -     Comprehensive metabolic panel; Future  -     CBC w AUTO Differential; Future    2. Seasonal allergic rhinitis, unspecified trigger  -     Triamcinolone Acetonide (Nasacort Allergy 24HR) 55 MCG/ACT nasal inhaler; 2 sprays into the nostril(s) as directed by provider Daily.  Dispense: 16.5 g; Refill: 11    3. Dyslipidemia    4. Unspecified intellectual disabilities    I reviewed the CBC,CMP,and Lipid Profile  .  Return to the clinic in 3 month/s.  Will contact with results as needed.    "

## 2022-06-17 PROCEDURE — 87635 SARS-COV-2 COVID-19 AMP PRB: CPT | Performed by: NURSE PRACTITIONER

## 2022-07-13 ENCOUNTER — OFFICE VISIT (OUTPATIENT)
Dept: FAMILY MEDICINE CLINIC | Facility: CLINIC | Age: 52
End: 2022-07-13

## 2022-07-13 ENCOUNTER — LAB (OUTPATIENT)
Dept: LAB | Facility: HOSPITAL | Age: 52
End: 2022-07-13

## 2022-07-13 VITALS
DIASTOLIC BLOOD PRESSURE: 70 MMHG | WEIGHT: 165.06 LBS | BODY MASS INDEX: 25.91 KG/M2 | OXYGEN SATURATION: 100 % | HEIGHT: 67 IN | SYSTOLIC BLOOD PRESSURE: 136 MMHG | HEART RATE: 108 BPM

## 2022-07-13 DIAGNOSIS — I10 ESSENTIAL HYPERTENSION: ICD-10-CM

## 2022-07-13 DIAGNOSIS — Z00.00 MEDICARE ANNUAL WELLNESS VISIT, SUBSEQUENT: Primary | ICD-10-CM

## 2022-07-13 DIAGNOSIS — E55.9 VITAMIN D DEFICIENCY: ICD-10-CM

## 2022-07-13 DIAGNOSIS — F41.9 ANXIETY: ICD-10-CM

## 2022-07-13 DIAGNOSIS — E78.01 FAMILIAL HYPERCHOLESTEROLEMIA: ICD-10-CM

## 2022-07-13 DIAGNOSIS — D64.9 ANEMIA, UNSPECIFIED TYPE: ICD-10-CM

## 2022-07-13 DIAGNOSIS — G40.909 NONINTRACTABLE EPILEPSY WITHOUT STATUS EPILEPTICUS, UNSPECIFIED EPILEPSY TYPE: ICD-10-CM

## 2022-07-13 PROCEDURE — 1170F FXNL STATUS ASSESSED: CPT | Performed by: FAMILY MEDICINE

## 2022-07-13 PROCEDURE — 82306 VITAMIN D 25 HYDROXY: CPT

## 2022-07-13 PROCEDURE — G0439 PPPS, SUBSEQ VISIT: HCPCS | Performed by: FAMILY MEDICINE

## 2022-07-13 PROCEDURE — 84443 ASSAY THYROID STIM HORMONE: CPT

## 2022-07-13 PROCEDURE — 80053 COMPREHEN METABOLIC PANEL: CPT

## 2022-07-13 PROCEDURE — 84439 ASSAY OF FREE THYROXINE: CPT

## 2022-07-13 PROCEDURE — 85025 COMPLETE CBC W/AUTO DIFF WBC: CPT

## 2022-07-13 PROCEDURE — 80061 LIPID PANEL: CPT

## 2022-07-13 PROCEDURE — 99214 OFFICE O/P EST MOD 30 MIN: CPT | Performed by: FAMILY MEDICINE

## 2022-07-13 PROCEDURE — 36415 COLL VENOUS BLD VENIPUNCTURE: CPT

## 2022-07-13 PROCEDURE — 1159F MED LIST DOCD IN RCRD: CPT | Performed by: FAMILY MEDICINE

## 2022-07-13 RX ORDER — LAMOTRIGINE 150 MG/1
150 TABLET ORAL 2 TIMES DAILY
Qty: 180 TABLET | Refills: 2 | Status: SHIPPED | OUTPATIENT
Start: 2022-07-13

## 2022-07-13 RX ORDER — DIAZEPAM 5 MG/1
5 TABLET ORAL NIGHTLY
Qty: 30 TABLET | Refills: 5 | Status: SHIPPED | OUTPATIENT
Start: 2022-07-13 | End: 2023-01-09

## 2022-07-13 RX ORDER — ERGOCALCIFEROL 1.25 MG/1
50000 CAPSULE ORAL
Qty: 8 CAPSULE | Refills: 2 | Status: SHIPPED | OUTPATIENT
Start: 2022-07-13

## 2022-07-13 NOTE — PROGRESS NOTES
The ABCs of the Annual Wellness Visit  Subsequent Medicare Wellness Visit  Cc:Medicare Wellness exam  Chief Complaint   Patient presents with   • Follow-up   • Anxiety   • Hyperlipidemia   • Hypertension   • Medicare Wellness-subsequent      Subjective    History of Present Illness:  Teodora Whitman is a 52 y.o. female who presents for a Subsequent Medicare Wellness Visit.    The following portions of the patient's history were reviewed and   updated as appropriate: allergies, current medications, past family history, past medical history, past social history, past surgical history and problem list.    Compared to one year ago, the patient feels her physical   health is the same.    Compared to one year ago, the patient feels her mental   health is the same.    Recent Hospitalizations:  She was not admitted to the hospital during the last year.       Current Medical Providers:  Patient Care Team:  Gilbert Hamilton MD as PCP - General (Family Medicine)  Bam Casanova MD as Consulting Physician (Hematology and Oncology)    Outpatient Medications Prior to Visit   Medication Sig Dispense Refill   • acetaminophen (TYLENOL) 500 MG tablet Take 1 tablet by mouth Every 6 (Six) Hours As Needed for Moderate Pain  or Headache. 30 tablet 6   • cetirizine (zyrTEC) 10 MG tablet Take 1 tablet by mouth Daily. 30 tablet 11   • citalopram (CeleXA) 40 MG tablet Take 1 tablet by mouth Daily. 30 tablet 11   • famotidine (PEPCID) 20 MG tablet Take 1 tablet by mouth 2 (Two) Times a Day. 180 tablet 3   • guaiFENesin (Mucinex) 600 MG 12 hr tablet One po bid 60 tablet 11   • hydroCHLOROthiazide (MICROZIDE) 12.5 MG capsule Take 1 capsule by mouth Every Morning. 31 capsule 11   • megestrol (MEGACE) 40 MG/ML suspension GIVE ONE TEASPOONFUL (5ML) BY MOUTH ONCE DAILY 155 mL 11   • Melatonin 5 MG capsule Take 5 mg by mouth Every Night. 30 each 11   • melatonin 5 MG tablet tablet GIVE ONE TABLET BY MOUTH AT BEDTIME. 28 each 11   • metoprolol  succinate XL (TOPROL-XL) 25 MG 24 hr tablet Take 1 tablet by mouth Daily. 31 tablet 11   • rosuvastatin (CRESTOR) 10 MG tablet Take 1 tablet by mouth Daily. 30 tablet 11   • Triamcinolone Acetonide (Nasacort Allergy 24HR) 55 MCG/ACT nasal inhaler 2 sprays into the nostril(s) as directed by provider Daily. 16.5 g 11   • vitamin B-12 (CYANOCOBALAMIN) 1000 MCG tablet Take 1 tablet by mouth Daily. 30 tablet 8   • diazePAM (VALIUM) 5 MG tablet Take 1 tablet by mouth Every Night. 30 tablet 5   • lamoTRIgine (LaMICtal) 150 MG tablet Take 1 tablet by mouth 2 (Two) Times a Day. 180 tablet 2   • vitamin D (ERGOCALCIFEROL) 1.25 MG (65301 UT) capsule capsule Take 1 capsule by mouth Every 14 (Fourteen) Days. 8 capsule 2     No facility-administered medications prior to visit.       No opioid medication identified on active medication list. I have reviewed chart for other potential  high risk medication/s and harmful drug interactions in the elderly.          Aspirin is not on active medication list.  Aspirin use is not indicated based on review of current medical condition/s. Risk of harm outweighs potential benefits.  .    Patient Active Problem List   Diagnosis   • Aphasia   • Epilepsy (HCC)   • Gastroesophageal reflux disease without esophagitis   • Iron deficiency anemia   • Obsessive-compulsive disorder   • History of Hodgkin's disease   • Unspecified intellectual disabilities   • CKD (chronic kidney disease)   • Essential hypertension   • Chest pain   • Closed fracture of left distal radius   • Developmental delay   • Abnormal chest x-ray   • Tachycardia   • Closed fracture of lower end of left ulna with routine healing   • Anemia   • Turpin's esophagus   • Family history of colorectal cancer   • Hiatal hernia   • History of adenomatous polyp of colon   • Dyslipidemia   • Vitamin D deficiency   • Anxiety     Advance Care Planning  Advance Directive is not on file.  ACP discussion was held with the patient during this  "visit. Patient does not have an advance directive, information provided.          Objective    Vitals:    07/13/22 1324   BP: 136/70   Pulse: 108   SpO2: 100%   Weight: 74.9 kg (165 lb 1 oz)   Height: 170.2 cm (67\")     Estimated body mass index is 25.85 kg/m² as calculated from the following:    Height as of this encounter: 170.2 cm (67\").    Weight as of this encounter: 74.9 kg (165 lb 1 oz).    BMI is >= 25 and <30. (Overweight) The following options were offered after discussion;: Continue on current diet.      Does the patient have evidence of cognitive impairment? Yes    Physical Exam            HEALTH RISK ASSESSMENT    Smoking Status:  Social History     Tobacco Use   Smoking Status Never Smoker   Smokeless Tobacco Never Used     Alcohol Consumption:  Social History     Substance and Sexual Activity   Alcohol Use No     Fall Risk Screen:    ANTHONYADI Fall Risk Assessment was completed, and patient is at LOW risk for falls.Assessment completed on:7/13/2022    Depression Screening:  PHQ-2/PHQ-9 Depression Screening 7/13/2022   Retired Total Score -   Little Interest or Pleasure in Doing Things 0-->not at all   Feeling Down, Depressed or Hopeless 0-->not at all   PHQ-9: Brief Depression Severity Measure Score 0       Health Habits and Functional and Cognitive Screening:  Functional & Cognitive Status 7/13/2022   Do you have difficulty preparing food and eating? Yes   Do you have difficulty bathing yourself, getting dressed or grooming yourself? Yes   Do you have difficulty using the toilet? No   Do you have difficulty moving around from place to place? No   Do you have trouble with steps or getting out of a bed or a chair? No   Current Diet Well Balanced Diet   Dental Exam Up to date        Dental Exam Comment -   Eye Exam Not up to date   Exercise (times per week) 0 times per week   Current Exercises Include No Regular Exercise   Current Exercise Activities Include -   Do you need help using the phone?  Yes   Are " you deaf or do you have serious difficulty hearing?  No   Do you need help with transportation? Yes   Do you need help shopping? Yes   Do you need help preparing meals?  Yes   Do you need help with housework?  Yes   Do you need help with laundry? Yes   Do you need help taking your medications? Yes   Do you need help managing money? Yes   Do you ever drive or ride in a car without wearing a seat belt? No   Have you felt unusual stress, anger or loneliness in the last month? Yes   Who do you live with? Community   If you need help, do you have trouble finding someone available to you? No   Have you been bothered in the last four weeks by sexual problems? No   Do you have difficulty concentrating, remembering or making decisions? No       Age-appropriate Screening Schedule:  Refer to the list below for future screening recommendations based on patient's age, sex and/or medical conditions. Orders for these recommended tests are listed in the plan section. The patient has been provided with a written plan.    Health Maintenance   Topic Date Due   • TDAP/TD VACCINES (2 - Tdap) 05/12/1990   • PAP SMEAR  08/10/2020   • ZOSTER VACCINE (1 of 2) Never done   • MAMMOGRAM  12/28/2022 (Originally 3/30/2013)   • INFLUENZA VACCINE  10/01/2022   • LIPID PANEL  03/31/2023              Assessment & Plan   CMS Preventative Services Quick Reference  Risk Factors Identified During Encounter  Dementia/Memory   Depression/Dysphoria  Fall Risk-High or Moderate  Hearing Problem  The above risks/problems have been discussed with the patient.  Follow up actions/plans if indicated are seen below in the Assessment/Plan Section.  Pertinent information has been shared with the patient in the After Visit Summary.    Diagnoses and all orders for this visit:    1. Medicare annual wellness visit, subsequent (Primary)    2. Anxiety  -     diazePAM (VALIUM) 5 MG tablet; Take 1 tablet by mouth Every Night.  Dispense: 30 tablet; Refill: 5  -     Lipid  Panel; Future  -     Comprehensive metabolic panel; Future  -     CBC w AUTO Differential; Future  -     Vitamin D 25 hydroxy; Future  -     TSH; Future  -     T4, free; Future    3. Vitamin D deficiency  -     vitamin D (ERGOCALCIFEROL) 1.25 MG (04831 UT) capsule capsule; Take 1 capsule by mouth Every 14 (Fourteen) Days.  Dispense: 8 capsule; Refill: 2  -     Lipid Panel; Future  -     Comprehensive metabolic panel; Future  -     CBC w AUTO Differential; Future  -     Vitamin D 25 hydroxy; Future  -     TSH; Future  -     T4, free; Future    4. Essential hypertension  -     Lipid Panel; Future  -     Comprehensive metabolic panel; Future  -     CBC w AUTO Differential; Future    5. Anemia, unspecified type  -     CBC w AUTO Differential; Future    6. Nonintractable epilepsy without status epilepticus, unspecified epilepsy type (HCC)  -     lamoTRIgine (LaMICtal) 150 MG tablet; Take 1 tablet by mouth 2 (Two) Times a Day.  Dispense: 180 tablet; Refill: 2    7. Familial hypercholesterolemia        Follow Up:   No follow-ups on file.     An After Visit Summary and PPPS were made available to the patient.    Return to the clinic in 3 month/s.  Will contact with results as needed.

## 2022-07-13 NOTE — PROGRESS NOTES
Subjective   Teodora Whitman is a 52 y.o. female.   Cc: follow up of chronic medical issues    Anxiety  Presents for follow-up visit. Patient reports no chest pain, compulsions, confusion, depressed mood, excessive worry, feeling of choking, irritability, nervous/anxious behavior, obsessions, palpitations, restlessness or shortness of breath.     Her past medical history is significant for anemia.   Hyperlipidemia  This is a chronic problem. The current episode started more than 1 year ago. The problem is resistant. Pertinent negatives include no chest pain or shortness of breath.   Hypertension  This is a chronic problem. The current episode started more than 1 year ago. The problem has been gradually improving since onset. Associated symptoms include anxiety. Pertinent negatives include no blurred vision, chest pain, headaches, malaise/fatigue, neck pain, orthopnea, palpitations, peripheral edema, PND, shortness of breath or sweats. Current antihypertension treatment includes beta blockers.   Anemia  Presents for follow-up visit. There has been no abdominal pain, anorexia, bruising/bleeding easily, confusion, fever, light-headedness, malaise/fatigue, palpitations or weight loss.   She has a history of Epilepsy . She is stable with this.  She has a history of mental developmental delay and Vitamin D Deficiency This is at it's base line.    The following portions of the patient's history were reviewed and updated as appropriate: allergies, current medications, past family history, past medical history, past social history, past surgical history and problem list.    Review of Systems   Constitutional: Negative for fever, irritability, malaise/fatigue and weight loss.   Eyes: Negative for blurred vision.   Respiratory: Negative for shortness of breath.    Cardiovascular: Negative for chest pain, palpitations, orthopnea and PND.   Gastrointestinal: Negative for abdominal pain and anorexia.   Musculoskeletal:  "Negative for neck pain.   Neurological: Negative for light-headedness and headaches.   Hematological: Does not bruise/bleed easily.   Psychiatric/Behavioral: Negative for confusion. The patient is not nervous/anxious.        Objective   Physical Exam  Vitals reviewed.   Constitutional:       Appearance: Normal appearance.   HENT:      Head: Normocephalic and atraumatic.      Right Ear: Tympanic membrane, ear canal and external ear normal.      Left Ear: Tympanic membrane, ear canal and external ear normal.      Nose: Nose normal.      Mouth/Throat:      Mouth: Mucous membranes are moist.      Pharynx: Oropharynx is clear.   Cardiovascular:      Rate and Rhythm: Normal rate and regular rhythm.      Heart sounds: Normal heart sounds. No murmur heard.    No friction rub. No gallop.   Pulmonary:      Effort: Pulmonary effort is normal. No respiratory distress.      Breath sounds: Normal breath sounds. No stridor. No wheezing, rhonchi or rales.   Chest:      Chest wall: No tenderness.   Abdominal:      General: Bowel sounds are normal. There is no distension.      Palpations: Abdomen is soft. There is no mass.      Tenderness: There is no abdominal tenderness. There is no guarding or rebound.      Hernia: No hernia is present.   Musculoskeletal:      Cervical back: Normal range of motion.   Skin:     General: Skin is warm and dry.   Neurological:      Mental Status: She is alert.           Visit Vitals  /70   Pulse 108   Ht 170.2 cm (67\")   Wt 74.9 kg (165 lb 1 oz)   LMP  (LMP Unknown)   SpO2 100%   Breastfeeding No   BMI 25.85 kg/m²     Body mass index is 25.85 kg/m².      Assessment/Plan   Diagnoses and all orders for this visit:    1. Anxiety (Primary)  -     diazePAM (VALIUM) 5 MG tablet; Take 1 tablet by mouth Every Night.  Dispense: 30 tablet; Refill: 5  -     Lipid Panel; Future  -     Comprehensive metabolic panel; Future  -     CBC w AUTO Differential; Future  -     Vitamin D 25 hydroxy; Future  -     TSH; " Future  -     T4, free; Future    2. Unspecified intellectual disabilities  -     diazePAM (VALIUM) 5 MG tablet; Take 1 tablet by mouth Every Night.  Dispense: 30 tablet; Refill: 5  -     Lipid Panel; Future  -     Comprehensive metabolic panel; Future  -     CBC w AUTO Differential; Future  -     Vitamin D 25 hydroxy; Future  -     TSH; Future  -     T4, free; Future    3. Vitamin D deficiency  -     vitamin D (ERGOCALCIFEROL) 1.25 MG (61286 UT) capsule capsule; Take 1 capsule by mouth Every 14 (Fourteen) Days.  Dispense: 8 capsule; Refill: 2  -     Lipid Panel; Future  -     Comprehensive metabolic panel; Future  -     CBC w AUTO Differential; Future  -     Vitamin D 25 hydroxy; Future  -     TSH; Future  -     T4, free; Future    4. Essential hypertension  -     Lipid Panel; Future  -     Comprehensive metabolic panel; Future  -     CBC w AUTO Differential; Future    5. Anemia, unspecified type  -     CBC w AUTO Differential; Future    6. Nonintractable epilepsy without status epilepticus, unspecified epilepsy type (HCC)  -     lamoTRIgine (LaMICtal) 150 MG tablet; Take 1 tablet by mouth 2 (Two) Times a Day.  Dispense: 180 tablet; Refill: 2    7. Familial hypercholesterolemia    I reviewed the CBC,CMP,and Lipid Profile with the patient.  Return to the clinic in 3 month/s.  Will contact with results as needed.

## 2022-07-14 LAB
25(OH)D3 SERPL-MCNC: 45 NG/ML (ref 30–100)
ALBUMIN SERPL-MCNC: 4.3 G/DL (ref 3.5–5.2)
ALBUMIN/GLOB SERPL: 1.5 G/DL
ALP SERPL-CCNC: 84 U/L (ref 39–117)
ALT SERPL W P-5'-P-CCNC: 10 U/L (ref 1–33)
ANION GAP SERPL CALCULATED.3IONS-SCNC: 12 MMOL/L (ref 5–15)
AST SERPL-CCNC: 13 U/L (ref 1–32)
BASOPHILS # BLD AUTO: 0.04 10*3/MM3 (ref 0–0.2)
BASOPHILS NFR BLD AUTO: 0.5 % (ref 0–1.5)
BILIRUB SERPL-MCNC: 0.2 MG/DL (ref 0–1.2)
BUN SERPL-MCNC: 14 MG/DL (ref 6–20)
BUN/CREAT SERPL: 10.1 (ref 7–25)
CALCIUM SPEC-SCNC: 9.6 MG/DL (ref 8.6–10.5)
CHLORIDE SERPL-SCNC: 104 MMOL/L (ref 98–107)
CHOLEST SERPL-MCNC: 137 MG/DL (ref 0–200)
CO2 SERPL-SCNC: 28 MMOL/L (ref 22–29)
CREAT SERPL-MCNC: 1.38 MG/DL (ref 0.57–1)
DEPRECATED RDW RBC AUTO: 44 FL (ref 37–54)
EGFRCR SERPLBLD CKD-EPI 2021: 46.4 ML/MIN/1.73
EOSINOPHIL # BLD AUTO: 0.11 10*3/MM3 (ref 0–0.4)
EOSINOPHIL NFR BLD AUTO: 1.4 % (ref 0.3–6.2)
ERYTHROCYTE [DISTWIDTH] IN BLOOD BY AUTOMATED COUNT: 13.8 % (ref 12.3–15.4)
GLOBULIN UR ELPH-MCNC: 2.9 GM/DL
GLUCOSE SERPL-MCNC: 74 MG/DL (ref 65–99)
HCT VFR BLD AUTO: 37.8 % (ref 34–46.6)
HDLC SERPL-MCNC: 44 MG/DL (ref 40–60)
HGB BLD-MCNC: 12.8 G/DL (ref 12–15.9)
IMM GRANULOCYTES # BLD AUTO: 0.09 10*3/MM3 (ref 0–0.05)
IMM GRANULOCYTES NFR BLD AUTO: 1.1 % (ref 0–0.5)
LDLC SERPL CALC-MCNC: 79 MG/DL (ref 0–100)
LDLC/HDLC SERPL: 1.79 {RATIO}
LYMPHOCYTES # BLD AUTO: 1.42 10*3/MM3 (ref 0.7–3.1)
LYMPHOCYTES NFR BLD AUTO: 17.4 % (ref 19.6–45.3)
MCH RBC QN AUTO: 29.8 PG (ref 26.6–33)
MCHC RBC AUTO-ENTMCNC: 33.9 G/DL (ref 31.5–35.7)
MCV RBC AUTO: 87.9 FL (ref 79–97)
MONOCYTES # BLD AUTO: 0.7 10*3/MM3 (ref 0.1–0.9)
MONOCYTES NFR BLD AUTO: 8.6 % (ref 5–12)
NEUTROPHILS NFR BLD AUTO: 5.78 10*3/MM3 (ref 1.7–7)
NEUTROPHILS NFR BLD AUTO: 71 % (ref 42.7–76)
NRBC BLD AUTO-RTO: 0 /100 WBC (ref 0–0.2)
PLATELET # BLD AUTO: 318 10*3/MM3 (ref 140–450)
PMV BLD AUTO: 9 FL (ref 6–12)
POTASSIUM SERPL-SCNC: 4.1 MMOL/L (ref 3.5–5.2)
PROT SERPL-MCNC: 7.2 G/DL (ref 6–8.5)
RBC # BLD AUTO: 4.3 10*6/MM3 (ref 3.77–5.28)
SODIUM SERPL-SCNC: 144 MMOL/L (ref 136–145)
T4 FREE SERPL-MCNC: 1.06 NG/DL (ref 0.93–1.7)
TRIGL SERPL-MCNC: 72 MG/DL (ref 0–150)
TSH SERPL DL<=0.05 MIU/L-ACNC: 0.78 UIU/ML (ref 0.27–4.2)
VLDLC SERPL-MCNC: 14 MG/DL (ref 5–40)
WBC NRBC COR # BLD: 8.14 10*3/MM3 (ref 3.4–10.8)

## 2022-08-23 PROCEDURE — 87635 SARS-COV-2 COVID-19 AMP PRB: CPT | Performed by: EMERGENCY MEDICINE

## 2022-10-05 ENCOUNTER — TELEPHONE (OUTPATIENT)
Dept: FAMILY MEDICINE CLINIC | Facility: CLINIC | Age: 52
End: 2022-10-05

## 2022-10-05 NOTE — TELEPHONE ENCOUNTER
[facility] is needing a copy of Teodora's last yearly physical faxed to [phone]. Please call back @ [phone] with any questions.

## 2022-10-13 ENCOUNTER — OFFICE VISIT (OUTPATIENT)
Dept: FAMILY MEDICINE CLINIC | Facility: CLINIC | Age: 52
End: 2022-10-13

## 2022-10-13 ENCOUNTER — LAB (OUTPATIENT)
Dept: LAB | Facility: HOSPITAL | Age: 52
End: 2022-10-13

## 2022-10-13 VITALS
WEIGHT: 168.25 LBS | BODY MASS INDEX: 26.41 KG/M2 | OXYGEN SATURATION: 97 % | DIASTOLIC BLOOD PRESSURE: 76 MMHG | SYSTOLIC BLOOD PRESSURE: 132 MMHG | HEART RATE: 102 BPM | HEIGHT: 67 IN

## 2022-10-13 DIAGNOSIS — E78.01 FAMILIAL HYPERCHOLESTEROLEMIA: ICD-10-CM

## 2022-10-13 DIAGNOSIS — F41.9 ANXIETY: Primary | ICD-10-CM

## 2022-10-13 DIAGNOSIS — G40.909 NONINTRACTABLE EPILEPSY WITHOUT STATUS EPILEPTICUS, UNSPECIFIED EPILEPSY TYPE: ICD-10-CM

## 2022-10-13 DIAGNOSIS — D64.9 ANEMIA, UNSPECIFIED TYPE: ICD-10-CM

## 2022-10-13 DIAGNOSIS — F41.9 ANXIETY: ICD-10-CM

## 2022-10-13 DIAGNOSIS — I10 ESSENTIAL HYPERTENSION: ICD-10-CM

## 2022-10-13 PROCEDURE — 90686 IIV4 VACC NO PRSV 0.5 ML IM: CPT | Performed by: FAMILY MEDICINE

## 2022-10-13 PROCEDURE — 85025 COMPLETE CBC W/AUTO DIFF WBC: CPT

## 2022-10-13 PROCEDURE — G0008 ADMIN INFLUENZA VIRUS VAC: HCPCS | Performed by: FAMILY MEDICINE

## 2022-10-13 PROCEDURE — 36415 COLL VENOUS BLD VENIPUNCTURE: CPT

## 2022-10-13 PROCEDURE — 80053 COMPREHEN METABOLIC PANEL: CPT

## 2022-10-13 PROCEDURE — 99214 OFFICE O/P EST MOD 30 MIN: CPT | Performed by: FAMILY MEDICINE

## 2022-10-13 PROCEDURE — 80061 LIPID PANEL: CPT

## 2022-10-13 RX ORDER — DIAZEPAM 5 MG/1
5 TABLET ORAL NIGHTLY
Qty: 30 TABLET | Refills: 3 | Status: CANCELLED | OUTPATIENT
Start: 2022-10-13

## 2022-10-13 RX ORDER — LANOLIN ALCOHOL/MO/W.PET/CERES
1000 CREAM (GRAM) TOPICAL DAILY
Qty: 30 TABLET | Refills: 8 | Status: CANCELLED | OUTPATIENT
Start: 2022-10-13

## 2022-10-13 NOTE — PROGRESS NOTES
Subjective   Teodora Whitman is a 52 y.o. female.   Cc: follow up of chronic medical issues    Anemia  Presents for follow-up visit. There has been no abdominal pain, anorexia, bruising/bleeding easily, confusion, fever, leg swelling, light-headedness, malaise/fatigue, pica or weight loss.   Anxiety  Presents for follow-up visit. Patient reports no chest pain, compulsions, confusion, depressed mood, dizziness, excessive worry, feeling of choking, insomnia, irritability, nervous/anxious behavior, obsessions, shortness of breath or suicidal ideas.     Her past medical history is significant for anemia.   Hyperlipidemia  This is a chronic problem. The current episode started more than 1 year ago. The problem is resistant. Pertinent negatives include no chest pain or shortness of breath. Current antihyperlipidemic treatment includes statins.   Hypertension  This is a chronic problem. The current episode started more than 1 year ago. The problem has been gradually improving since onset. Associated symptoms include anxiety and headaches. Pertinent negatives include no chest pain, malaise/fatigue, orthopnea, peripheral edema, PND, shortness of breath or sweats. Current antihypertension treatment includes beta blockers.   She has a history of seizures. She is at her base line.    The following portions of the patient's history were reviewed and updated as appropriate: allergies, current medications, past family history, past medical history, past social history, past surgical history and problem list.    Review of Systems   Constitutional: Negative for fever, irritability, malaise/fatigue and weight loss.   Respiratory: Negative for shortness of breath.    Cardiovascular: Negative for chest pain, orthopnea and PND.   Gastrointestinal: Negative for abdominal pain and anorexia.   Neurological: Positive for headaches. Negative for dizziness and light-headedness.   Hematological: Does not bruise/bleed easily.  "  Psychiatric/Behavioral: Negative for confusion and suicidal ideas. The patient is not nervous/anxious and does not have insomnia.        Objective   Physical Exam  Vitals reviewed.   Constitutional:       Appearance: Normal appearance.   HENT:      Head: Normocephalic and atraumatic.      Right Ear: Tympanic membrane, ear canal and external ear normal.      Left Ear: Tympanic membrane, ear canal and external ear normal.      Nose: Nose normal.      Mouth/Throat:      Mouth: Mucous membranes are moist.      Pharynx: Oropharynx is clear.   Cardiovascular:      Rate and Rhythm: Normal rate and regular rhythm.      Heart sounds: Normal heart sounds. No murmur heard.    No friction rub. No gallop.   Pulmonary:      Effort: Pulmonary effort is normal. No respiratory distress.      Breath sounds: Normal breath sounds. No stridor. No wheezing, rhonchi or rales.   Chest:      Chest wall: No tenderness.   Abdominal:      General: Bowel sounds are normal. There is no distension.      Palpations: Abdomen is soft. There is no mass.      Tenderness: There is no abdominal tenderness. There is no guarding or rebound.      Hernia: No hernia is present.   Musculoskeletal:      Cervical back: Normal range of motion and neck supple.   Skin:     General: Skin is warm and dry.   Neurological:      Mental Status: She is alert.           Visit Vitals  /76   Pulse 102   Ht 170.2 cm (67\")   Wt 76.3 kg (168 lb 4 oz)   LMP  (LMP Unknown)   SpO2 97%   Breastfeeding No   BMI 26.35 kg/m²     Body mass index is 26.35 kg/m².      Assessment/Plan   Diagnoses and all orders for this visit:    1. Anxiety (Primary)  -     CBC w AUTO Differential; Future  -     Lipid Panel; Future    2. Nonintractable epilepsy without status epilepticus, unspecified epilepsy type (HCC)  -     CBC w AUTO Differential; Future  -     Lipid Panel; Future    3. Familial hypercholesterolemia  -     CBC w AUTO Differential; Future  -     Lipid Panel; Future  -     " Comprehensive metabolic panel; Future    4. Anemia, unspecified type    5. Essential hypertension    Other orders  -     FluLaval/Fluzone >6 mos ([id number])    I reviewed the CBC,CMP,and Lipid Profile with the patient.  Return to the clinic in 3 month/s.  Will contact with results as needed.                This document has been electronically signed by Gilbert Hamilton MD on October 13, 2022 14:50 CDT

## 2022-10-14 LAB
ALBUMIN SERPL-MCNC: 4.4 G/DL (ref 3.5–5.2)
ALBUMIN/GLOB SERPL: 1.6 G/DL
ALP SERPL-CCNC: 83 U/L (ref 39–117)
ALT SERPL W P-5'-P-CCNC: 19 U/L (ref 1–33)
ANION GAP SERPL CALCULATED.3IONS-SCNC: 14.9 MMOL/L (ref 5–15)
AST SERPL-CCNC: 19 U/L (ref 1–32)
BASOPHILS # BLD AUTO: 0.06 10*3/MM3 (ref 0–0.2)
BASOPHILS NFR BLD AUTO: 0.7 % (ref 0–1.5)
BILIRUB SERPL-MCNC: 0.3 MG/DL (ref 0–1.2)
BUN SERPL-MCNC: 19 MG/DL (ref 6–20)
BUN/CREAT SERPL: 14.7 (ref 7–25)
CALCIUM SPEC-SCNC: 9.4 MG/DL (ref 8.6–10.5)
CHLORIDE SERPL-SCNC: 101 MMOL/L (ref 98–107)
CHOLEST SERPL-MCNC: 145 MG/DL (ref 0–200)
CO2 SERPL-SCNC: 25.1 MMOL/L (ref 22–29)
CREAT SERPL-MCNC: 1.29 MG/DL (ref 0.57–1)
DEPRECATED RDW RBC AUTO: 42.2 FL (ref 37–54)
EGFRCR SERPLBLD CKD-EPI 2021: 50.4 ML/MIN/1.73
EOSINOPHIL # BLD AUTO: 0.18 10*3/MM3 (ref 0–0.4)
EOSINOPHIL NFR BLD AUTO: 2.2 % (ref 0.3–6.2)
ERYTHROCYTE [DISTWIDTH] IN BLOOD BY AUTOMATED COUNT: 13.3 % (ref 12.3–15.4)
GLOBULIN UR ELPH-MCNC: 2.8 GM/DL
GLUCOSE SERPL-MCNC: 66 MG/DL (ref 65–99)
HCT VFR BLD AUTO: 39.2 % (ref 34–46.6)
HDLC SERPL-MCNC: 44 MG/DL (ref 40–60)
HGB BLD-MCNC: 13.3 G/DL (ref 12–15.9)
IMM GRANULOCYTES # BLD AUTO: 0.04 10*3/MM3 (ref 0–0.05)
IMM GRANULOCYTES NFR BLD AUTO: 0.5 % (ref 0–0.5)
LDLC SERPL CALC-MCNC: 86 MG/DL (ref 0–100)
LDLC/HDLC SERPL: 1.95 {RATIO}
LYMPHOCYTES # BLD AUTO: 1.77 10*3/MM3 (ref 0.7–3.1)
LYMPHOCYTES NFR BLD AUTO: 21.6 % (ref 19.6–45.3)
MCH RBC QN AUTO: 29.6 PG (ref 26.6–33)
MCHC RBC AUTO-ENTMCNC: 33.9 G/DL (ref 31.5–35.7)
MCV RBC AUTO: 87.3 FL (ref 79–97)
MONOCYTES # BLD AUTO: 0.81 10*3/MM3 (ref 0.1–0.9)
MONOCYTES NFR BLD AUTO: 9.9 % (ref 5–12)
NEUTROPHILS NFR BLD AUTO: 5.32 10*3/MM3 (ref 1.7–7)
NEUTROPHILS NFR BLD AUTO: 65.1 % (ref 42.7–76)
NRBC BLD AUTO-RTO: 0 /100 WBC (ref 0–0.2)
PLATELET # BLD AUTO: 317 10*3/MM3 (ref 140–450)
PMV BLD AUTO: 9.1 FL (ref 6–12)
POTASSIUM SERPL-SCNC: 3.8 MMOL/L (ref 3.5–5.2)
PROT SERPL-MCNC: 7.2 G/DL (ref 6–8.5)
RBC # BLD AUTO: 4.49 10*6/MM3 (ref 3.77–5.28)
SODIUM SERPL-SCNC: 141 MMOL/L (ref 136–145)
TRIGL SERPL-MCNC: 77 MG/DL (ref 0–150)
VLDLC SERPL-MCNC: 15 MG/DL (ref 5–40)
WBC NRBC COR # BLD: 8.18 10*3/MM3 (ref 3.4–10.8)

## 2022-11-04 RX ORDER — LANOLIN ALCOHOL/MO/W.PET/CERES
CREAM (GRAM) TOPICAL
Qty: 30 TABLET | Refills: 11 | Status: SHIPPED | OUTPATIENT
Start: 2022-11-04

## 2022-12-05 ENCOUNTER — LAB (OUTPATIENT)
Dept: LAB | Facility: HOSPITAL | Age: 52
End: 2022-12-05

## 2022-12-05 ENCOUNTER — TRANSCRIBE ORDERS (OUTPATIENT)
Dept: LAB | Facility: HOSPITAL | Age: 52
End: 2022-12-05

## 2022-12-05 DIAGNOSIS — N18.31 CHRONIC KIDNEY DISEASE (CKD) STAGE G3A/A1, MODERATELY DECREASED GLOMERULAR FILTRATION RATE (GFR) BETWEEN 45-59 ML/MIN/1.73 SQUARE METER AND ALBUMINURIA CREATININE RATIO LESS THAN 30 MG/G (CMS/H*: ICD-10-CM

## 2022-12-05 DIAGNOSIS — R62.50 DEVELOPMENT DELAY: Primary | ICD-10-CM

## 2022-12-05 DIAGNOSIS — E55.9 VITAMIN D DEFICIENCY DISEASE: ICD-10-CM

## 2022-12-05 DIAGNOSIS — I10 ESSENTIAL HYPERTENSION, BENIGN: ICD-10-CM

## 2022-12-05 DIAGNOSIS — Z85.72 HX OF NON-HODGKIN'S LYMPHOMA: ICD-10-CM

## 2022-12-05 DIAGNOSIS — D50.0 IRON DEFICIENCY ANEMIA DUE TO CHRONIC BLOOD LOSS: ICD-10-CM

## 2022-12-05 DIAGNOSIS — E78.5 DYSLIPIDEMIA: ICD-10-CM

## 2022-12-05 PROCEDURE — 85025 COMPLETE CBC W/AUTO DIFF WBC: CPT | Performed by: INTERNAL MEDICINE

## 2022-12-05 PROCEDURE — 82306 VITAMIN D 25 HYDROXY: CPT | Performed by: INTERNAL MEDICINE

## 2022-12-05 PROCEDURE — 80048 BASIC METABOLIC PNL TOTAL CA: CPT | Performed by: INTERNAL MEDICINE

## 2022-12-05 PROCEDURE — 83735 ASSAY OF MAGNESIUM: CPT | Performed by: INTERNAL MEDICINE

## 2022-12-05 PROCEDURE — 36415 COLL VENOUS BLD VENIPUNCTURE: CPT | Performed by: INTERNAL MEDICINE

## 2022-12-05 PROCEDURE — 84100 ASSAY OF PHOSPHORUS: CPT | Performed by: INTERNAL MEDICINE

## 2022-12-06 LAB
25(OH)D3 SERPL-MCNC: 41 NG/ML (ref 30–100)
ANION GAP SERPL CALCULATED.3IONS-SCNC: 9 MMOL/L (ref 5–15)
BASOPHILS # BLD AUTO: 0.03 10*3/MM3 (ref 0–0.2)
BASOPHILS NFR BLD AUTO: 0.4 % (ref 0–1.5)
BUN SERPL-MCNC: 17 MG/DL (ref 6–20)
BUN/CREAT SERPL: 14.2 (ref 7–25)
CALCIUM SPEC-SCNC: 9.5 MG/DL (ref 8.6–10.5)
CHLORIDE SERPL-SCNC: 103 MMOL/L (ref 98–107)
CO2 SERPL-SCNC: 28 MMOL/L (ref 22–29)
CREAT SERPL-MCNC: 1.2 MG/DL (ref 0.57–1)
DEPRECATED RDW RBC AUTO: 46 FL (ref 37–54)
EGFRCR SERPLBLD CKD-EPI 2021: 54.9 ML/MIN/1.73
EOSINOPHIL # BLD AUTO: 0.13 10*3/MM3 (ref 0–0.4)
EOSINOPHIL NFR BLD AUTO: 1.8 % (ref 0.3–6.2)
ERYTHROCYTE [DISTWIDTH] IN BLOOD BY AUTOMATED COUNT: 13.7 % (ref 12.3–15.4)
GLUCOSE SERPL-MCNC: 106 MG/DL (ref 65–99)
HCT VFR BLD AUTO: 38.7 % (ref 34–46.6)
HGB BLD-MCNC: 12.7 G/DL (ref 12–15.9)
IMM GRANULOCYTES # BLD AUTO: 0.06 10*3/MM3 (ref 0–0.05)
IMM GRANULOCYTES NFR BLD AUTO: 0.8 % (ref 0–0.5)
LYMPHOCYTES # BLD AUTO: 1.56 10*3/MM3 (ref 0.7–3.1)
LYMPHOCYTES NFR BLD AUTO: 21.1 % (ref 19.6–45.3)
MAGNESIUM SERPL-MCNC: 2.5 MG/DL (ref 1.6–2.6)
MCH RBC QN AUTO: 29.8 PG (ref 26.6–33)
MCHC RBC AUTO-ENTMCNC: 32.8 G/DL (ref 31.5–35.7)
MCV RBC AUTO: 90.8 FL (ref 79–97)
MONOCYTES # BLD AUTO: 0.59 10*3/MM3 (ref 0.1–0.9)
MONOCYTES NFR BLD AUTO: 8 % (ref 5–12)
NEUTROPHILS NFR BLD AUTO: 5.02 10*3/MM3 (ref 1.7–7)
NEUTROPHILS NFR BLD AUTO: 67.9 % (ref 42.7–76)
NRBC BLD AUTO-RTO: 0.1 /100 WBC (ref 0–0.2)
PHOSPHATE SERPL-MCNC: 3.5 MG/DL (ref 2.5–4.5)
PLATELET # BLD AUTO: 280 10*3/MM3 (ref 140–450)
PMV BLD AUTO: 9.4 FL (ref 6–12)
POTASSIUM SERPL-SCNC: 4 MMOL/L (ref 3.5–5.2)
RBC # BLD AUTO: 4.26 10*6/MM3 (ref 3.77–5.28)
SODIUM SERPL-SCNC: 140 MMOL/L (ref 136–145)
WBC NRBC COR # BLD: 7.39 10*3/MM3 (ref 3.4–10.8)

## 2023-01-09 DIAGNOSIS — F41.9 ANXIETY: ICD-10-CM

## 2023-01-09 RX ORDER — DIAZEPAM 5 MG/1
TABLET ORAL
Qty: 31 TABLET | Refills: 0 | Status: SHIPPED | OUTPATIENT
Start: 2023-01-09 | End: 2023-02-07

## 2023-01-09 NOTE — TELEPHONE ENCOUNTER
Dr. Hamilton Pt     Last rx  07/13/2022  #31,5 refills    Next Appt  With Family Medicine (Gilbert Hamilton MD)  01/16/2023 at 1:15 PM    Last OV 10/2022

## 2023-01-16 ENCOUNTER — LAB (OUTPATIENT)
Dept: LAB | Facility: HOSPITAL | Age: 53
End: 2023-01-16
Payer: MEDICARE

## 2023-01-16 ENCOUNTER — OFFICE VISIT (OUTPATIENT)
Dept: FAMILY MEDICINE CLINIC | Facility: CLINIC | Age: 53
End: 2023-01-16
Payer: MEDICARE

## 2023-01-16 VITALS
OXYGEN SATURATION: 98 % | DIASTOLIC BLOOD PRESSURE: 70 MMHG | HEIGHT: 67 IN | BODY MASS INDEX: 28.16 KG/M2 | SYSTOLIC BLOOD PRESSURE: 130 MMHG | WEIGHT: 179.38 LBS | HEART RATE: 101 BPM

## 2023-01-16 DIAGNOSIS — E78.01 FAMILIAL HYPERCHOLESTEROLEMIA: ICD-10-CM

## 2023-01-16 DIAGNOSIS — I10 ESSENTIAL HYPERTENSION: Primary | ICD-10-CM

## 2023-01-16 DIAGNOSIS — I10 ESSENTIAL HYPERTENSION: ICD-10-CM

## 2023-01-16 DIAGNOSIS — F41.9 ANXIETY: ICD-10-CM

## 2023-01-16 DIAGNOSIS — K21.9 GASTROESOPHAGEAL REFLUX DISEASE WITHOUT ESOPHAGITIS: ICD-10-CM

## 2023-01-16 DIAGNOSIS — G40.909 NONINTRACTABLE EPILEPSY WITHOUT STATUS EPILEPTICUS, UNSPECIFIED EPILEPSY TYPE: ICD-10-CM

## 2023-01-16 PROCEDURE — 80053 COMPREHEN METABOLIC PANEL: CPT

## 2023-01-16 PROCEDURE — 85025 COMPLETE CBC W/AUTO DIFF WBC: CPT

## 2023-01-16 PROCEDURE — 36415 COLL VENOUS BLD VENIPUNCTURE: CPT

## 2023-01-16 PROCEDURE — 99214 OFFICE O/P EST MOD 30 MIN: CPT | Performed by: FAMILY MEDICINE

## 2023-01-16 PROCEDURE — 80061 LIPID PANEL: CPT

## 2023-01-16 RX ORDER — CITALOPRAM 40 MG/1
40 TABLET ORAL DAILY
Qty: 30 TABLET | Refills: 5 | Status: CANCELLED | OUTPATIENT
Start: 2023-01-16

## 2023-01-16 RX ORDER — TRIAMCINOLONE ACETONIDE 55 UG/1
2 SPRAY, METERED NASAL DAILY
Qty: 16.5 G | Refills: 5 | Status: CANCELLED | OUTPATIENT
Start: 2023-01-16

## 2023-01-16 RX ORDER — HYDROCHLOROTHIAZIDE 12.5 MG/1
12.5 CAPSULE, GELATIN COATED ORAL EVERY MORNING
Qty: 30 CAPSULE | Refills: 5 | Status: CANCELLED | OUTPATIENT
Start: 2023-01-16

## 2023-01-16 RX ORDER — CETIRIZINE HYDROCHLORIDE 10 MG/1
10 TABLET ORAL DAILY
Qty: 30 TABLET | Refills: 5 | Status: CANCELLED | OUTPATIENT
Start: 2023-01-16

## 2023-01-16 RX ORDER — DIAZEPAM 5 MG/1
5 TABLET ORAL NIGHTLY
Qty: 30 TABLET | Refills: 3 | Status: CANCELLED | OUTPATIENT
Start: 2023-01-16

## 2023-01-16 RX ORDER — GUAIFENESIN 600 MG/1
TABLET, EXTENDED RELEASE ORAL
Qty: 60 TABLET | Refills: 5 | Status: CANCELLED | OUTPATIENT
Start: 2023-01-16

## 2023-01-16 RX ORDER — LAMOTRIGINE 150 MG/1
150 TABLET ORAL 2 TIMES DAILY
Qty: 180 TABLET | Refills: 1 | Status: CANCELLED | OUTPATIENT
Start: 2023-01-16

## 2023-01-16 RX ORDER — ACETAMINOPHEN 500 MG
500 TABLET ORAL EVERY 6 HOURS PRN
Qty: 30 TABLET | Refills: 6 | Status: CANCELLED | OUTPATIENT
Start: 2023-01-16

## 2023-01-16 RX ORDER — ERGOCALCIFEROL 1.25 MG/1
50000 CAPSULE ORAL
Qty: 8 CAPSULE | Refills: 1 | Status: CANCELLED | OUTPATIENT
Start: 2023-01-16

## 2023-01-16 RX ORDER — METOPROLOL SUCCINATE 25 MG/1
25 TABLET, EXTENDED RELEASE ORAL DAILY
Qty: 30 TABLET | Refills: 5 | Status: CANCELLED | OUTPATIENT
Start: 2023-01-16

## 2023-01-16 RX ORDER — LANOLIN ALCOHOL/MO/W.PET/CERES
1000 CREAM (GRAM) TOPICAL DAILY
Qty: 30 TABLET | Refills: 5 | Status: CANCELLED | OUTPATIENT
Start: 2023-01-16

## 2023-01-16 RX ORDER — FAMOTIDINE 20 MG/1
20 TABLET, FILM COATED ORAL 2 TIMES DAILY
Qty: 180 TABLET | Refills: 1 | Status: CANCELLED | OUTPATIENT
Start: 2023-01-16

## 2023-01-16 RX ORDER — ROSUVASTATIN CALCIUM 10 MG/1
10 TABLET, COATED ORAL DAILY
Qty: 30 TABLET | Refills: 5 | Status: CANCELLED | OUTPATIENT
Start: 2023-01-16

## 2023-01-16 NOTE — PROGRESS NOTES
Subjective   Teodora Whitman is a 52 y.o. female.   Cc: follow up of chronic medical issues    Anxiety  Presents for follow-up visit. Patient reports no chest pain, feeling of choking, hyperventilation, insomnia, nausea, nervous/anxious behavior or shortness of breath.       Hyperlipidemia  This is a chronic problem. The current episode started more than 1 year ago. The problem is resistant. Pertinent negatives include no chest pain or shortness of breath. Current antihyperlipidemic treatment includes statins.   Hypertension  This is a chronic problem. The current episode started more than 1 year ago. The problem has been gradually improving since onset. Associated symptoms include anxiety. Pertinent negatives include no blurred vision, chest pain, peripheral edema or shortness of breath. Current antihypertension treatment includes beta blockers.   Heartburn  She reports no abdominal pain, no belching, no chest pain, no heartburn, no nausea or no wheezing.   She has a history of epilepsy. She has not had seizures since she was [age] years of age.    The following portions of the patient's history were reviewed and updated as appropriate: allergies, current medications, past family history, past medical history, past social history, past surgical history and problem list.    Review of Systems   Eyes: Negative for blurred vision.   Respiratory: Negative for shortness of breath and wheezing.    Cardiovascular: Negative for chest pain.   Gastrointestinal: Negative for abdominal pain, heartburn and nausea.   Psychiatric/Behavioral: The patient is not nervous/anxious and does not have insomnia.        Objective   Physical Exam  Vitals reviewed.   Constitutional:       Appearance: Normal appearance.   HENT:      Head: Normocephalic and atraumatic.      Right Ear: Tympanic membrane, ear canal and external ear normal.      Left Ear: Tympanic membrane, ear canal and external ear normal.      Nose: Congestion present.       "Mouth/Throat:      Mouth: Mucous membranes are moist.   Cardiovascular:      Rate and Rhythm: Normal rate and regular rhythm.      Heart sounds: Normal heart sounds. No murmur heard.    No friction rub. No gallop.   Pulmonary:      Effort: Pulmonary effort is normal. No respiratory distress.      Breath sounds: Normal breath sounds. No stridor. No wheezing, rhonchi or rales.   Chest:      Chest wall: No tenderness.   Abdominal:      General: Bowel sounds are normal. There is no distension.      Palpations: Abdomen is soft. There is no mass.      Tenderness: There is no abdominal tenderness. There is no guarding or rebound.      Hernia: No hernia is present.   Musculoskeletal:      Cervical back: Normal range of motion and neck supple.   Skin:     General: Skin is warm and dry.   Neurological:      Mental Status: She is alert.           Visit Vitals  /70   Pulse 101   Ht 170.2 cm (67\")   Wt 81.4 kg (179 lb 6 oz)   LMP  (LMP Unknown)   SpO2 98%   Breastfeeding No   BMI 28.09 kg/m²     Body mass index is 28.09 kg/m².      Assessment/Plan   Diagnoses and all orders for this visit:    1. Essential hypertension (Primary)    2. Anxiety    3. Nonintractable epilepsy without status epilepticus, unspecified epilepsy type (HCC)    4. Familial hypercholesterolemia    5. Gastroesophageal reflux disease without esophagitis    I reviewed her CBC,CMP and Magnesium.  Return to the clinic in e month/s.  Will contact with results as needed.          This document has been electronically signed by Gilbert Hamilton MD on January 16, 2023 14:45 CST    "

## 2023-01-17 LAB
ALBUMIN SERPL-MCNC: 4.5 G/DL (ref 3.5–5.2)
ALBUMIN/GLOB SERPL: 1.7 G/DL
ALP SERPL-CCNC: 83 U/L (ref 39–117)
ALT SERPL W P-5'-P-CCNC: 13 U/L (ref 1–33)
ANION GAP SERPL CALCULATED.3IONS-SCNC: 9.4 MMOL/L (ref 5–15)
AST SERPL-CCNC: 13 U/L (ref 1–32)
BASOPHILS # BLD AUTO: 0.04 10*3/MM3 (ref 0–0.2)
BASOPHILS NFR BLD AUTO: 0.5 % (ref 0–1.5)
BILIRUB SERPL-MCNC: 0.2 MG/DL (ref 0–1.2)
BUN SERPL-MCNC: 17 MG/DL (ref 6–20)
BUN/CREAT SERPL: 14.7 (ref 7–25)
CALCIUM SPEC-SCNC: 9.3 MG/DL (ref 8.6–10.5)
CHLORIDE SERPL-SCNC: 103 MMOL/L (ref 98–107)
CHOLEST SERPL-MCNC: 139 MG/DL (ref 0–200)
CO2 SERPL-SCNC: 29.6 MMOL/L (ref 22–29)
CREAT SERPL-MCNC: 1.16 MG/DL (ref 0.57–1)
DEPRECATED RDW RBC AUTO: 43.8 FL (ref 37–54)
EGFRCR SERPLBLD CKD-EPI 2021: 57.2 ML/MIN/1.73
EOSINOPHIL # BLD AUTO: 0.14 10*3/MM3 (ref 0–0.4)
EOSINOPHIL NFR BLD AUTO: 1.9 % (ref 0.3–6.2)
ERYTHROCYTE [DISTWIDTH] IN BLOOD BY AUTOMATED COUNT: 13.3 % (ref 12.3–15.4)
GLOBULIN UR ELPH-MCNC: 2.6 GM/DL
GLUCOSE SERPL-MCNC: 91 MG/DL (ref 65–99)
HCT VFR BLD AUTO: 38.8 % (ref 34–46.6)
HDLC SERPL-MCNC: 42 MG/DL (ref 40–60)
HGB BLD-MCNC: 13.1 G/DL (ref 12–15.9)
IMM GRANULOCYTES # BLD AUTO: 0.05 10*3/MM3 (ref 0–0.05)
IMM GRANULOCYTES NFR BLD AUTO: 0.7 % (ref 0–0.5)
LDLC SERPL CALC-MCNC: 79 MG/DL (ref 0–100)
LDLC/HDLC SERPL: 1.85 {RATIO}
LYMPHOCYTES # BLD AUTO: 1.46 10*3/MM3 (ref 0.7–3.1)
LYMPHOCYTES NFR BLD AUTO: 19.8 % (ref 19.6–45.3)
MCH RBC QN AUTO: 30.3 PG (ref 26.6–33)
MCHC RBC AUTO-ENTMCNC: 33.8 G/DL (ref 31.5–35.7)
MCV RBC AUTO: 89.8 FL (ref 79–97)
MONOCYTES # BLD AUTO: 0.6 10*3/MM3 (ref 0.1–0.9)
MONOCYTES NFR BLD AUTO: 8.1 % (ref 5–12)
NEUTROPHILS NFR BLD AUTO: 5.1 10*3/MM3 (ref 1.7–7)
NEUTROPHILS NFR BLD AUTO: 69 % (ref 42.7–76)
NRBC BLD AUTO-RTO: 0 /100 WBC (ref 0–0.2)
PLATELET # BLD AUTO: 300 10*3/MM3 (ref 140–450)
PMV BLD AUTO: 9.2 FL (ref 6–12)
POTASSIUM SERPL-SCNC: 4.2 MMOL/L (ref 3.5–5.2)
PROT SERPL-MCNC: 7.1 G/DL (ref 6–8.5)
RBC # BLD AUTO: 4.32 10*6/MM3 (ref 3.77–5.28)
SODIUM SERPL-SCNC: 142 MMOL/L (ref 136–145)
TRIGL SERPL-MCNC: 97 MG/DL (ref 0–150)
VLDLC SERPL-MCNC: 18 MG/DL (ref 5–40)
WBC NRBC COR # BLD: 7.39 10*3/MM3 (ref 3.4–10.8)

## 2023-02-07 DIAGNOSIS — F41.9 ANXIETY: ICD-10-CM

## 2023-02-07 RX ORDER — FAMOTIDINE 20 MG/1
TABLET, FILM COATED ORAL
Qty: 56 TABLET | Refills: 11 | Status: SHIPPED | OUTPATIENT
Start: 2023-02-07

## 2023-02-07 RX ORDER — GUAIFENESIN 600 MG/1
TABLET, EXTENDED RELEASE ORAL
Qty: 56 TABLET | Refills: 11 | Status: SHIPPED | OUTPATIENT
Start: 2023-02-07

## 2023-02-07 RX ORDER — DIAZEPAM 5 MG/1
5 TABLET ORAL NIGHTLY PRN
Qty: 30 TABLET | Refills: 5 | Status: SHIPPED | OUTPATIENT
Start: 2023-02-07 | End: 2023-03-01 | Stop reason: SDUPTHER

## 2023-03-01 DIAGNOSIS — F41.9 ANXIETY: ICD-10-CM

## 2023-03-01 DIAGNOSIS — R51.9 FRONTAL HEADACHE: ICD-10-CM

## 2023-03-01 RX ORDER — ACETAMINOPHEN 500 MG
500 TABLET ORAL EVERY 6 HOURS PRN
Qty: 30 TABLET | Refills: 6 | Status: SHIPPED | OUTPATIENT
Start: 2023-03-01

## 2023-03-01 RX ORDER — DIAZEPAM 5 MG/1
5 TABLET ORAL NIGHTLY
Qty: 30 TABLET | Refills: 5 | Status: SHIPPED | OUTPATIENT
Start: 2023-03-01

## 2023-03-01 NOTE — TELEPHONE ENCOUNTER
Incoming Refill Request      Medication requested (name and dose): acetaminophen (TYLENOL) 500 MG tablet    Pharmacy where request should be sent: [facility]    Additional details provided by patient:     Best call back number: [phone]    Does the patient have less than a 3 day supply:  [x] Yes  [] No    Natalie Floresed Rep  03/01/23, 12:54 CST

## 2023-03-07 RX ORDER — HYDROCHLOROTHIAZIDE 12.5 MG/1
CAPSULE, GELATIN COATED ORAL
Qty: 31 CAPSULE | Refills: 11 | Status: SHIPPED | OUTPATIENT
Start: 2023-03-07

## 2023-03-07 RX ORDER — MEGESTROL ACETATE 40 MG/ML
SUSPENSION ORAL
Qty: 155 ML | Refills: 11 | Status: SHIPPED | OUTPATIENT
Start: 2023-03-07

## 2023-03-07 RX ORDER — METOPROLOL SUCCINATE 25 MG/1
TABLET, EXTENDED RELEASE ORAL
Qty: 31 TABLET | Refills: 11 | Status: SHIPPED | OUTPATIENT
Start: 2023-03-07

## 2023-03-07 RX ORDER — CETIRIZINE HYDROCHLORIDE 10 MG/1
TABLET ORAL
Qty: 31 TABLET | Refills: 11 | Status: SHIPPED | OUTPATIENT
Start: 2023-03-07

## 2023-03-07 RX ORDER — CHOLECALCIFEROL (VITAMIN D3) 125 MCG
CAPSULE ORAL
Qty: 31 EACH | Refills: 11 | Status: SHIPPED | OUTPATIENT
Start: 2023-03-07

## 2023-03-07 RX ORDER — ROSUVASTATIN CALCIUM 10 MG/1
TABLET, COATED ORAL
Qty: 31 TABLET | Refills: 11 | Status: SHIPPED | OUTPATIENT
Start: 2023-03-07

## 2023-03-07 RX ORDER — CITALOPRAM 40 MG/1
TABLET ORAL
Qty: 31 TABLET | Refills: 11 | Status: SHIPPED | OUTPATIENT
Start: 2023-03-07

## 2023-03-18 PROBLEM — J20.9 ACUTE BRONCHITIS: Status: ACTIVE | Noted: 2023-03-18

## 2023-04-18 ENCOUNTER — LAB (OUTPATIENT)
Dept: LAB | Facility: HOSPITAL | Age: 53
End: 2023-04-18
Payer: MEDICARE

## 2023-04-18 ENCOUNTER — APPOINTMENT (OUTPATIENT)
Dept: LAB | Facility: HOSPITAL | Age: 53
End: 2023-04-18
Payer: MEDICARE

## 2023-04-18 ENCOUNTER — OFFICE VISIT (OUTPATIENT)
Dept: FAMILY MEDICINE CLINIC | Facility: CLINIC | Age: 53
End: 2023-04-18
Payer: MEDICARE

## 2023-04-18 VITALS
DIASTOLIC BLOOD PRESSURE: 88 MMHG | HEIGHT: 67 IN | OXYGEN SATURATION: 98 % | WEIGHT: 184.3 LBS | BODY MASS INDEX: 28.93 KG/M2 | HEART RATE: 106 BPM | SYSTOLIC BLOOD PRESSURE: 132 MMHG

## 2023-04-18 DIAGNOSIS — E78.5 DYSLIPIDEMIA: ICD-10-CM

## 2023-04-18 DIAGNOSIS — F41.9 ANXIETY: ICD-10-CM

## 2023-04-18 DIAGNOSIS — I10 ESSENTIAL HYPERTENSION: ICD-10-CM

## 2023-04-18 DIAGNOSIS — K21.9 GASTROESOPHAGEAL REFLUX DISEASE WITHOUT ESOPHAGITIS: ICD-10-CM

## 2023-04-18 DIAGNOSIS — J30.2 SEASONAL ALLERGIC RHINITIS, UNSPECIFIED TRIGGER: ICD-10-CM

## 2023-04-18 DIAGNOSIS — G40.909 NONINTRACTABLE EPILEPSY WITHOUT STATUS EPILEPTICUS, UNSPECIFIED EPILEPSY TYPE: ICD-10-CM

## 2023-04-18 DIAGNOSIS — I10 ESSENTIAL HYPERTENSION: Primary | ICD-10-CM

## 2023-04-18 PROCEDURE — 99214 OFFICE O/P EST MOD 30 MIN: CPT | Performed by: FAMILY MEDICINE

## 2023-04-18 PROCEDURE — 3075F SYST BP GE 130 - 139MM HG: CPT | Performed by: FAMILY MEDICINE

## 2023-04-18 PROCEDURE — 3079F DIAST BP 80-89 MM HG: CPT | Performed by: FAMILY MEDICINE

## 2023-04-18 PROCEDURE — 1159F MED LIST DOCD IN RCRD: CPT | Performed by: FAMILY MEDICINE

## 2023-04-18 PROCEDURE — 1160F RVW MEDS BY RX/DR IN RCRD: CPT | Performed by: FAMILY MEDICINE

## 2023-04-18 PROCEDURE — 36415 COLL VENOUS BLD VENIPUNCTURE: CPT

## 2023-04-18 PROCEDURE — 80061 LIPID PANEL: CPT

## 2023-04-18 PROCEDURE — 85025 COMPLETE CBC W/AUTO DIFF WBC: CPT

## 2023-04-18 RX ORDER — TRIAMCINOLONE ACETONIDE 55 UG/1
SPRAY, METERED NASAL
Qty: 16.9 ML | Refills: 10 | Status: SHIPPED | OUTPATIENT
Start: 2023-04-18

## 2023-04-18 NOTE — PROGRESS NOTES
Subjective   Teodora Whitman is a 52 y.o. female.    Cc: follow up of chronic medical issues    Hypertension  This is a chronic problem. The current episode started more than 1 year ago. The problem has been gradually improving since onset. Associated symptoms include anxiety, headaches and malaise/fatigue. Pertinent negatives include no blurred vision, chest pain, neck pain, orthopnea, palpitations, peripheral edema, PND, shortness of breath or sweats.   Hyperlipidemia  This is a chronic problem. The current episode started more than 1 year ago. The problem is resistant. Pertinent negatives include no chest pain or shortness of breath. Current antihyperlipidemic treatment includes statins.   Anxiety  Presents for follow-up visit. Patient reports no chest pain, confusion, depressed mood, feeling of choking, insomnia, nausea, nervous/anxious behavior, palpitations or shortness of breath.       Heartburn  She reports no abdominal pain, no belching, no chest pain, no choking, no coughing, no nausea, no sore throat or no water brash.   Seizures   This is a chronic problem. The problem has been gradually improving. Associated symptoms include headaches. Pertinent negatives include no confusion, no sore throat, no chest pain, no cough, no nausea, no vomiting and no diarrhea.       The following portions of the patient's history were reviewed and updated as appropriate: allergies, current medications, past family history, past medical history, past social history, past surgical history and problem list.    Review of Systems   Constitutional: Positive for malaise/fatigue.   HENT: Negative for sore throat.    Eyes: Negative for blurred vision.   Respiratory: Negative for cough, choking and shortness of breath.    Cardiovascular: Negative for chest pain, palpitations, orthopnea and PND.   Gastrointestinal: Negative for abdominal pain, diarrhea, nausea and vomiting.   Musculoskeletal: Negative for neck pain.   Neurological:  "Positive for seizures and headaches.   Psychiatric/Behavioral: Negative for confusion. The patient is not nervous/anxious and does not have insomnia.        Objective   Physical Exam  Vitals reviewed.   Constitutional:       Appearance: Normal appearance.   HENT:      Head: Normocephalic and atraumatic.      Right Ear: Tympanic membrane, ear canal and external ear normal.      Left Ear: Tympanic membrane, ear canal and external ear normal.      Nose: Nose normal.      Mouth/Throat:      Mouth: Mucous membranes are moist.      Pharynx: Oropharynx is clear.   Cardiovascular:      Rate and Rhythm: Normal rate and regular rhythm.      Heart sounds: Normal heart sounds. No murmur heard.    No friction rub. No gallop.   Pulmonary:      Effort: Pulmonary effort is normal. No respiratory distress.      Breath sounds: Normal breath sounds. No stridor. No wheezing, rhonchi or rales.   Chest:      Chest wall: No tenderness.   Abdominal:      General: Bowel sounds are normal. There is no distension.      Palpations: Abdomen is soft. There is no mass.      Tenderness: There is no abdominal tenderness. There is no guarding or rebound.      Hernia: No hernia is present.   Musculoskeletal:      Cervical back: Normal range of motion.   Skin:     General: Skin is warm and dry.   Neurological:      Mental Status: She is alert.           Visit Vitals  /88   Pulse 106   Ht 170.2 cm (67\")   Wt 83.6 kg (184 lb 4.8 oz)   SpO2 98%   BMI 28.87 kg/m²     Body mass index is 28.87 kg/m².      Assessment/Plan   Diagnoses and all orders for this visit:    1. Essential hypertension (Primary)  -     Comprehensive metabolic panel; Future  -     CBC w AUTO Differential; Future    2. Dyslipidemia  -     CBC w AUTO Differential; Future  -     Lipid panel; Future    3. Nonintractable epilepsy without status epilepticus, unspecified epilepsy type    4. Anxiety    5. Gastroesophageal reflux disease without esophagitis    I reviewed the CBC,CMP,and " Lipid Profile (01/16/23) with the patient.  Hypertension,Hyperlipidemia,Epilepsy,Anxiety,and GERD is stable. Continue on current medications.  Return to the clinic in 3 month/s.  Will contact with results as needed.            This document has been electronically signed by Gilbert Hamilton MD on April 18, 2023 15:08 CDT

## 2023-04-19 LAB
BASOPHILS # BLD AUTO: 0.04 10*3/MM3 (ref 0–0.2)
BASOPHILS NFR BLD AUTO: 0.5 % (ref 0–1.5)
CHOLEST SERPL-MCNC: 160 MG/DL (ref 0–200)
DEPRECATED RDW RBC AUTO: 41.2 FL (ref 37–54)
EOSINOPHIL # BLD AUTO: 0.13 10*3/MM3 (ref 0–0.4)
EOSINOPHIL NFR BLD AUTO: 1.5 % (ref 0.3–6.2)
ERYTHROCYTE [DISTWIDTH] IN BLOOD BY AUTOMATED COUNT: 12.9 % (ref 12.3–15.4)
HCT VFR BLD AUTO: 38.1 % (ref 34–46.6)
HDLC SERPL-MCNC: 42 MG/DL (ref 40–60)
HGB BLD-MCNC: 12.6 G/DL (ref 12–15.9)
IMM GRANULOCYTES # BLD AUTO: 0.09 10*3/MM3 (ref 0–0.05)
IMM GRANULOCYTES NFR BLD AUTO: 1.1 % (ref 0–0.5)
LDLC SERPL CALC-MCNC: 101 MG/DL (ref 0–100)
LDLC/HDLC SERPL: 2.37 {RATIO}
LYMPHOCYTES # BLD AUTO: 1.65 10*3/MM3 (ref 0.7–3.1)
LYMPHOCYTES NFR BLD AUTO: 19.4 % (ref 19.6–45.3)
MCH RBC QN AUTO: 28.8 PG (ref 26.6–33)
MCHC RBC AUTO-ENTMCNC: 33.1 G/DL (ref 31.5–35.7)
MCV RBC AUTO: 87 FL (ref 79–97)
MONOCYTES # BLD AUTO: 0.73 10*3/MM3 (ref 0.1–0.9)
MONOCYTES NFR BLD AUTO: 8.6 % (ref 5–12)
NEUTROPHILS NFR BLD AUTO: 5.87 10*3/MM3 (ref 1.7–7)
NEUTROPHILS NFR BLD AUTO: 68.9 % (ref 42.7–76)
NRBC BLD AUTO-RTO: 0 /100 WBC (ref 0–0.2)
PLATELET # BLD AUTO: 339 10*3/MM3 (ref 140–450)
PMV BLD AUTO: 9.2 FL (ref 6–12)
RBC # BLD AUTO: 4.38 10*6/MM3 (ref 3.77–5.28)
TRIGL SERPL-MCNC: 93 MG/DL (ref 0–150)
VLDLC SERPL-MCNC: 17 MG/DL (ref 5–40)
WBC NRBC COR # BLD: 8.51 10*3/MM3 (ref 3.4–10.8)

## 2023-06-11 ENCOUNTER — APPOINTMENT (OUTPATIENT)
Dept: GENERAL RADIOLOGY | Facility: HOSPITAL | Age: 53
End: 2023-06-11
Payer: MEDICARE

## 2023-06-11 ENCOUNTER — HOSPITAL ENCOUNTER (EMERGENCY)
Facility: HOSPITAL | Age: 53
Discharge: HOME | End: 2023-06-11
Attending: STUDENT IN AN ORGANIZED HEALTH CARE EDUCATION/TRAINING PROGRAM | Admitting: STUDENT IN AN ORGANIZED HEALTH CARE EDUCATION/TRAINING PROGRAM
Payer: MEDICARE

## 2023-06-11 VITALS
HEART RATE: 108 BPM | OXYGEN SATURATION: 96 % | HEIGHT: 68 IN | SYSTOLIC BLOOD PRESSURE: 166 MMHG | WEIGHT: 189.6 LBS | BODY MASS INDEX: 28.73 KG/M2 | RESPIRATION RATE: 20 BRPM | DIASTOLIC BLOOD PRESSURE: 90 MMHG | TEMPERATURE: 98.9 F

## 2023-06-11 DIAGNOSIS — T14.8XXA SKIN ABRASION: Primary | ICD-10-CM

## 2023-06-11 PROCEDURE — 73590 X-RAY EXAM OF LOWER LEG: CPT

## 2023-06-11 PROCEDURE — 99282 EMERGENCY DEPT VISIT SF MDM: CPT

## 2023-06-11 PROCEDURE — 73564 X-RAY EXAM KNEE 4 OR MORE: CPT

## 2023-06-11 RX ORDER — ACETAMINOPHEN 500 MG
500 TABLET ORAL EVERY 6 HOURS PRN
Refills: 0
Start: 2023-06-11 | End: 2023-06-14
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
